# Patient Record
Sex: MALE | Race: WHITE | NOT HISPANIC OR LATINO | Employment: FULL TIME | URBAN - METROPOLITAN AREA
[De-identification: names, ages, dates, MRNs, and addresses within clinical notes are randomized per-mention and may not be internally consistent; named-entity substitution may affect disease eponyms.]

---

## 2017-11-06 ENCOUNTER — OFFICE VISIT (OUTPATIENT)
Dept: URGENT CARE | Facility: CLINIC | Age: 42
End: 2017-11-06
Payer: COMMERCIAL

## 2017-11-06 VITALS
HEIGHT: 66 IN | SYSTOLIC BLOOD PRESSURE: 123 MMHG | OXYGEN SATURATION: 97 % | RESPIRATION RATE: 16 BRPM | DIASTOLIC BLOOD PRESSURE: 84 MMHG | TEMPERATURE: 98 F | WEIGHT: 188 LBS | BODY MASS INDEX: 30.22 KG/M2 | HEART RATE: 81 BPM

## 2017-11-06 DIAGNOSIS — R42 DIZZINESS: Primary | ICD-10-CM

## 2017-11-06 LAB — GLUCOSE SERPL-MCNC: 99 MG/DL (ref 70–110)

## 2017-11-06 PROCEDURE — 99203 OFFICE O/P NEW LOW 30 MIN: CPT | Mod: S$GLB,,, | Performed by: FAMILY MEDICINE

## 2017-11-06 PROCEDURE — 82948 REAGENT STRIP/BLOOD GLUCOSE: CPT | Mod: S$GLB,,, | Performed by: FAMILY MEDICINE

## 2017-11-06 NOTE — PROGRESS NOTES
"Subjective:       Patient ID: Jem Alvarez is a 42 y.o. male.    Vitals:  height is 5' 6" (1.676 m) and weight is 85.3 kg (188 lb). His temperature is 98.2 °F (36.8 °C). His blood pressure is 123/84 and his pulse is 81. His respiration is 16 and oxygen saturation is 97%.     Chief Complaint: Hypertension    Patient states has episodes of dizziness, vision issues.  Blood pressure was 147/89 about an hour ago.       Hypertension   This is a new problem. The current episode started today. The problem is unchanged. Associated symptoms include malaise/fatigue. Pertinent negatives include no blurred vision, chest pain, headaches or shortness of breath. Past treatments include nothing.     Review of Systems   Constitution: Positive for weakness and malaise/fatigue. Negative for chills and fever.   HENT: Negative for sore throat.    Eyes: Negative for blurred vision.   Cardiovascular: Negative for chest pain.   Respiratory: Negative for shortness of breath.    Skin: Negative for rash.   Musculoskeletal: Negative for back pain and joint pain.   Gastrointestinal: Negative for abdominal pain, diarrhea, nausea and vomiting.   Neurological: Positive for dizziness and focal weakness. Negative for headaches.   Psychiatric/Behavioral: The patient is not nervous/anxious.        Objective:      Physical Exam   Constitutional: He is oriented to person, place, and time. He appears well-developed and well-nourished. He is cooperative.  Non-toxic appearance. He does not appear ill. No distress.   Pt mentioned anxiety during the exam   HENT:   Head: Normocephalic and atraumatic.   Right Ear: Hearing, tympanic membrane, external ear and ear canal normal.   Left Ear: Hearing, tympanic membrane, external ear and ear canal normal.   Nose: Nose normal. No mucosal edema, rhinorrhea or nasal deformity. No epistaxis. Right sinus exhibits no maxillary sinus tenderness and no frontal sinus tenderness. Left sinus exhibits no maxillary sinus " tenderness and no frontal sinus tenderness.   Mouth/Throat: Uvula is midline, oropharynx is clear and moist and mucous membranes are normal. No trismus in the jaw. Normal dentition. No uvula swelling. No posterior oropharyngeal erythema.   Eyes: Conjunctivae and lids are normal. Right eye exhibits no discharge. Left eye exhibits no discharge. No scleral icterus.   Sclera clear bilat   Neck: Trachea normal, normal range of motion, full passive range of motion without pain and phonation normal. Neck supple.   Cardiovascular: Normal rate, regular rhythm, normal heart sounds, intact distal pulses and normal pulses.    Pulmonary/Chest: Effort normal and breath sounds normal. No respiratory distress.   Abdominal: Soft. Normal appearance. He exhibits no distension, no pulsatile midline mass and no mass. There is no tenderness.   Musculoskeletal: Normal range of motion. He exhibits no edema or deformity.   Neurological: He is alert and oriented to person, place, and time. He exhibits normal muscle tone. Coordination normal.   Skin: Skin is warm, dry and intact. He is not diaphoretic. No pallor.   Psychiatric: He has a normal mood and affect. His speech is normal and behavior is normal. Judgment and thought content normal. Cognition and memory are normal.   Nursing note and vitals reviewed.      Assessment:       1. Dizziness        Plan:         Dizziness  -     POCT glucose    pt states that he had gluc checked by [facility] this am-70 and now 99; and ekg at [facility] which he states was normal. Advised that BP ok now, and glucose normal range. If  chest pain returns or worsens, should go to ER for further w/u

## 2017-11-09 ENCOUNTER — TELEPHONE (OUTPATIENT)
Dept: URGENT CARE | Facility: CLINIC | Age: 42
End: 2017-11-09

## 2017-11-09 ENCOUNTER — OFFICE VISIT (OUTPATIENT)
Dept: FAMILY MEDICINE | Facility: CLINIC | Age: 42
End: 2017-11-09
Payer: COMMERCIAL

## 2017-11-09 VITALS
SYSTOLIC BLOOD PRESSURE: 126 MMHG | HEIGHT: 66 IN | WEIGHT: 189 LBS | BODY MASS INDEX: 30.37 KG/M2 | HEART RATE: 82 BPM | DIASTOLIC BLOOD PRESSURE: 92 MMHG | OXYGEN SATURATION: 98 %

## 2017-11-09 DIAGNOSIS — R55 NEAR SYNCOPE: ICD-10-CM

## 2017-11-09 DIAGNOSIS — F41.9 ANXIETY: Primary | ICD-10-CM

## 2017-11-09 DIAGNOSIS — R03.0 ELEVATED BLOOD PRESSURE READING WITHOUT DIAGNOSIS OF HYPERTENSION: ICD-10-CM

## 2017-11-09 PROCEDURE — 99203 OFFICE O/P NEW LOW 30 MIN: CPT | Mod: ,,, | Performed by: NURSE PRACTITIONER

## 2017-11-09 RX ORDER — SERTRALINE HYDROCHLORIDE 50 MG/1
50 TABLET, FILM COATED ORAL NIGHTLY
Qty: 30 TABLET | Refills: 1 | Status: SHIPPED | OUTPATIENT
Start: 2017-11-09 | End: 2017-11-14 | Stop reason: ALTCHOICE

## 2017-11-09 RX ORDER — ALPRAZOLAM 0.25 MG/1
0.5 TABLET ORAL DAILY
COMMUNITY
Start: 2017-11-07 | End: 2017-11-14 | Stop reason: ALTCHOICE

## 2017-11-09 NOTE — PROGRESS NOTES
SUBJECTIVE:   HPI:  Fatigue (felt close to passing out and nauseated. BS and BP was checked. Sugar was low and pressure was high.)      2 weeks ago had an appt with Dr Giang to evaluate heart palpitations. Dr Carrillo felt it may be related to anxiety and was started on xanax. Feels the xanax has helped a little with his anxiety but continues to have daily episodes. Has several test scheduled along with labs next week. 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 EMT was there and did an ekg which was wnl, bp was slightly elevated and glucose was low. When he was younger he would feel faint if he didn't eat for several hours.       Fatigue   This is a new problem. The current episode started in the past 7 days. The problem occurs daily. The problem has been unchanged. Associated symptoms include fatigue. Pertinent negatives include no chills, diaphoresis, joint swelling or vomiting. The treatment provided no relief.       (Not in a hospital admission)  Review of patient's allergies indicates:  No Known Allergies  No current outpatient prescriptions on file prior to visit.     No current facility-administered medications on file prior to visit.      Past Medical History:   Diagnosis Date    Anxiety      Past Surgical History:   Procedure Laterality Date    None       Family History   Problem Relation Age of Onset    Hypertension      No Known Problems Mother     Hypertension Father     Arthritis Father     Stroke Maternal Grandmother     No Known Problems Maternal Grandfather     Stroke Paternal Grandmother     No Known Problems Paternal Grandfather      Social History   Substance Use Topics    Smoking status: Current Some Day Smoker     Packs/day: 0.50     Types: Cigarettes     Start date: 1/1/2015    Smokeless tobacco: Never Used    Alcohol use Yes      Comment: seldom        Review of Systems   Constitutional: Positive for fatigue. Negative for  "appetite change, chills, diaphoresis and unexpected weight change.   HENT: Negative for ear discharge, facial swelling, hearing loss, nosebleeds and trouble swallowing.    Eyes: Negative for photophobia, pain and visual disturbance.   Respiratory: Negative for apnea and choking.    Cardiovascular: Negative for palpitations.   Gastrointestinal: Negative for blood in stool and vomiting.   Endocrine: Negative for polyphagia.   Genitourinary: Negative for difficulty urinating and hematuria.   Musculoskeletal: Negative for gait problem and joint swelling.   Skin: Negative for pallor.   Neurological: Negative for seizures and speech difficulty.   Hematological: Does not bruise/bleed easily.   Psychiatric/Behavioral: Negative for agitation and confusion.      OBJECTIVE:      Vitals:    11/09/17 0916   BP: 120/88   Pulse: 82   SpO2: 98%   Weight: 85.7 kg (189 lb)   Height: 5' 6" (1.676 m)     Physical Exam   Constitutional: He is oriented to person, place, and time. He appears well-developed and well-nourished.   HENT:   Head: Atraumatic.   Eyes: Conjunctivae are normal.   Neck: Neck supple.   Cardiovascular: Normal rate, regular rhythm, normal heart sounds and intact distal pulses.    Pulmonary/Chest: Effort normal and breath sounds normal.   Abdominal: Soft. Bowel sounds are normal. He exhibits no distension.   Musculoskeletal: Normal range of motion.   Neurological: He is alert and oriented to person, place, and time.   Skin: Skin is warm and dry.   Psychiatric: He has a normal mood and affect. His behavior is normal. Thought content normal. Cognition and memory are normal.   Nursing note and vitals reviewed.     Assessment:       1. Anxiety    2. Near syncope    3. Elevated blood pressure reading without diagnosis of hypertension        Plan:       Anxiety  -     sertraline (ZOLOFT) 50 MG tablet; Take 1 tablet (50 mg total) by mouth every evening. 1/2 tab x10d then 1 tab  Dispense: 30 tablet; Refill: 1              "   Near syncope  -     Glucose tolerance, 3 hours; Future; Expected date: 11/23/2017  -     CBC auto differential; Future; Expected date: 11/09/2017  -     Comprehensive metabolic panel; Future; Expected date: 11/09/2017  -     TSH; Future; Expected date: 11/09/2017  Will request copy of labs and diagnostic from Dr Giang    Elevated blood pressure reading without diagnosis of hypertension  Advise pt to take low sodium, healthier diet and exercise. Maintain the ideal body weight and if not improved discussed adding medication. Voiced understanding        Return in about 6 weeks (around 12/21/2017).      11/9/2017 JOLENE Ibrahim, FNP

## 2017-11-09 NOTE — PATIENT INSTRUCTIONS

## 2017-11-13 ENCOUNTER — TELEPHONE (OUTPATIENT)
Dept: FAMILY MEDICINE | Facility: CLINIC | Age: 42
End: 2017-11-13

## 2017-11-13 NOTE — TELEPHONE ENCOUNTER
Pt called and said he does not like the way the zoloft is making him feel. He says his head has been hurting and tingling and he is waking up a lot at night. He has been only taking half nightly.

## 2017-11-14 RX ORDER — BUSPIRONE HYDROCHLORIDE 10 MG/1
10 TABLET ORAL DAILY
Qty: 30 TABLET | Refills: 2 | Status: SHIPPED | OUTPATIENT
Start: 2017-11-14 | End: 2018-03-06

## 2018-03-06 PROBLEM — R07.2 PRECORDIAL PAIN: Status: ACTIVE | Noted: 2018-03-06

## 2018-03-06 PROBLEM — R07.9 CHEST PAIN: Status: RESOLVED | Noted: 2018-03-06 | Resolved: 2018-03-06

## 2018-03-06 PROBLEM — F41.9 ANXIETY: Status: ACTIVE | Noted: 2018-03-06

## 2018-03-06 PROBLEM — R07.9 CHEST PAIN: Status: ACTIVE | Noted: 2018-03-06

## 2018-03-06 PROBLEM — F17.200 SMOKING: Status: ACTIVE | Noted: 2018-03-06

## 2018-05-31 ENCOUNTER — TELEPHONE (OUTPATIENT)
Dept: FAMILY MEDICINE | Facility: CLINIC | Age: 43
End: 2018-05-31

## 2018-05-31 ENCOUNTER — OFFICE VISIT (OUTPATIENT)
Dept: FAMILY MEDICINE | Facility: CLINIC | Age: 43
End: 2018-05-31
Payer: COMMERCIAL

## 2018-05-31 VITALS
SYSTOLIC BLOOD PRESSURE: 120 MMHG | RESPIRATION RATE: 16 BRPM | HEART RATE: 67 BPM | HEIGHT: 66 IN | BODY MASS INDEX: 31.08 KG/M2 | WEIGHT: 193.38 LBS | TEMPERATURE: 98 F | OXYGEN SATURATION: 98 % | DIASTOLIC BLOOD PRESSURE: 70 MMHG

## 2018-05-31 DIAGNOSIS — N63.20 LEFT BREAST LUMP: Primary | ICD-10-CM

## 2018-05-31 DIAGNOSIS — N63.25 BREAST LUMP ON LEFT SIDE AT 3 O'CLOCK POSITION: Primary | ICD-10-CM

## 2018-05-31 PROCEDURE — 3008F BODY MASS INDEX DOCD: CPT | Mod: ,,, | Performed by: NURSE PRACTITIONER

## 2018-05-31 PROCEDURE — 99213 OFFICE O/P EST LOW 20 MIN: CPT | Mod: ,,, | Performed by: NURSE PRACTITIONER

## 2018-05-31 NOTE — TELEPHONE ENCOUNTER
----- Message from Lyssa Sullivan sent at 5/31/2018 10:42 AM CDT -----  Contact: [facility] [phone]/ fax [phone]  They need updated order for this patient; radiologist is requesting order for bilateral diagnostic mammogram with ultrasound and a payable code for these procedures.  Must be a specific code not unspecified please.  Lm

## 2018-05-31 NOTE — PATIENT INSTRUCTIONS
4 Steps for Eating Healthier  Changing the way you eat can improve your health. It can lower your cholesterol and blood pressure, and help you stay at a healthy weight. Your diet doesnt have to be bland and boring to be healthy. Just watch your calories and follow these steps:    1. Eat fewer unhealthy fats  · Choose more fish and lean meats instead of fatty cuts of meat.  · Skip butter and lard, and use less margarine.  · Pass on foods that have palm, coconut, or hydrogenated oils.  · Eat fewer high-fat dairy foods like cheese, ice cream, and whole milk.  · Get a heart-healthy cookbook and try some low-fat recipes.  2. Go light on salt  · Keep the saltshaker off the table.  · Limit high-salt ingredients, such as soy sauce, bouillon, and garlic salt.  · Instead of adding salt when cooking, season your food with herbs and flavorings. Try lemon, garlic, and onion.  · Limit convenience foods, such as boxed or canned foods and restaurant food.  · Read food labels and choose lower-sodium options.  3. Limit sugar  · Pause before you add sugars to pancakes, cereal, coffee, or tea. This includes white and brown table sugar, syrup, honey, and molasses. Cut your usual amount by half.  · Use non-sugar sweeteners. Stevia, aspartame, and sucralose can satisfy a sweet tooth without adding calories.  · Swap out sugar-filled soda and other drinks. Buy sugar-free or low-calorie beverages. Remember water is always the best choice.  · Read labels and choose foods with less added sugar. Keep in mind that dairy foods and foods with fruit will have some natural sugar.  · Cut the sugar in recipes by 1/3 to 1/2. Boost the flavor with extracts like almond, vanilla, or orange. Or add spices such as cinnamon or nutmeg.  4. Eat more fiber  · Eat fresh fruits and vegetables every day.  · Boost your diet with whole grains. Go for oats, whole-grain rice, and bran.  · Add beans and lentils to your meals.  · Drink more water to match your fiber  increase. This is to help prevent constipation.  Date Last Reviewed: 5/11/2015  © 6881-2184 [employer] [place] [employer]. [address] [place] [address], [place] [address]. All rights reserved. This information is not intended as a substitute for professional medical care. Always follow your healthcare professional's instructions.

## 2018-05-31 NOTE — PROGRESS NOTES
SUBJECTIVE:      Patient ID: Jem Alvarez is a 42 y.o. male.    Chief Complaint: Chest Pain (burning sensation.Patient states that he felt a lump on the left side and when he rubbed it he felt a burning sensation.)    Patient states a week ago he noticed a lump in his left breast, hurt to touch it or lay on that side. He left it alone for a few days and it has gotten better. Not as painful but if he rubs it is tender        Past Surgical History:   Procedure Laterality Date    None       Family History   Problem Relation Age of Onset    Hypertension Unknown     No Known Problems Mother     Hypertension Father     Arthritis Father     Stroke Maternal Grandmother     No Known Problems Maternal Grandfather     Stroke Paternal Grandmother     No Known Problems Paternal Grandfather       Social History     Social History    Marital status:      Spouse name: N/A    Number of children: N/A    Years of education: N/A     Occupational History          Social History Main Topics    Smoking status: Current Some Day Smoker     Packs/day: 0.50     Types: Cigarettes     Start date: 1/1/2015    Smokeless tobacco: Never Used    Alcohol use Yes      Comment: seldom    Drug use: No    Sexual activity: Yes     Other Topics Concern    None     Social History Narrative    None     No current outpatient prescriptions on file.     No current facility-administered medications for this visit.      Review of patient's allergies indicates:  No Known Allergies   Past Medical History:   Diagnosis Date    Anxiety      Past Surgical History:   Procedure Laterality Date    None         Review of Systems   Constitutional: Negative for appetite change, chills, diaphoresis and unexpected weight change.   HENT: Negative for ear discharge, facial swelling, hearing loss, nosebleeds and trouble swallowing.    Eyes: Negative for photophobia, pain and visual disturbance.   Respiratory: Negative for  "apnea and choking.    Cardiovascular: Negative for palpitations.   Gastrointestinal: Negative for blood in stool and vomiting.   Endocrine: Negative for polyphagia.   Genitourinary: Negative for difficulty urinating and hematuria.   Musculoskeletal: Negative for gait problem and joint swelling.   Skin: Negative for pallor.   Neurological: Negative for seizures and speech difficulty.   Hematological: Does not bruise/bleed easily.   Psychiatric/Behavioral: Negative for agitation and confusion.      OBJECTIVE:      Vitals:    05/31/18 0928   BP: 120/70   Pulse: 67   Resp: 16   SpO2: 98%   Weight: 87.7 kg (193 lb 6.4 oz)   Height: 5' 6" (1.676 m)     Physical Exam   Constitutional: He is oriented to person, place, and time. He appears well-developed and well-nourished.   HENT:   Head: Atraumatic.   Eyes: Conjunctivae are normal.   Neck: Neck supple.   Cardiovascular: Normal rate, regular rhythm, normal heart sounds and intact distal pulses.    Pulmonary/Chest: Effort normal and breath sounds normal.       Abdominal: Soft. Bowel sounds are normal. He exhibits no distension.   Musculoskeletal: Normal range of motion.   Neurological: He is alert and oriented to person, place, and time.   Skin: Skin is warm and dry.   Psychiatric: He has a normal mood and affect.   Nursing note and vitals reviewed.     Assessment:       1. Left breast lump        Plan:       Left breast lump  -     US Breast Left Limited; Future; Expected date: 05/31/2018        Follow-up if symptoms worsen or fail to improve.      5/31/2018 JOLENE Ibrahim, FNP      "

## 2018-06-01 ENCOUNTER — TELEPHONE (OUTPATIENT)
Dept: FAMILY MEDICINE | Facility: CLINIC | Age: 43
End: 2018-06-01

## 2018-06-01 DIAGNOSIS — N64.59 OTHER SIGNS AND SYMPTOMS IN BREAST: Primary | ICD-10-CM

## 2018-06-19 ENCOUNTER — TELEPHONE (OUTPATIENT)
Dept: FAMILY MEDICINE | Facility: CLINIC | Age: 43
End: 2018-06-19

## 2018-07-17 ENCOUNTER — TELEPHONE (OUTPATIENT)
Dept: FAMILY MEDICINE | Facility: CLINIC | Age: 43
End: 2018-07-17

## 2018-07-17 NOTE — TELEPHONE ENCOUNTER
Pt has appt tomorrow. Says for last 3 days he has been having severe lower back pain causing him to break out in sweat and tears.He is asking if we can order imaging before his appt.

## 2018-07-18 ENCOUNTER — OFFICE VISIT (OUTPATIENT)
Dept: FAMILY MEDICINE | Facility: CLINIC | Age: 43
End: 2018-07-18
Payer: COMMERCIAL

## 2018-07-18 VITALS
BODY MASS INDEX: 30.37 KG/M2 | HEART RATE: 78 BPM | DIASTOLIC BLOOD PRESSURE: 80 MMHG | SYSTOLIC BLOOD PRESSURE: 124 MMHG | WEIGHT: 189 LBS | HEIGHT: 66 IN | OXYGEN SATURATION: 98 %

## 2018-07-18 DIAGNOSIS — M54.50 ACUTE BILATERAL LOW BACK PAIN WITHOUT SCIATICA: Primary | ICD-10-CM

## 2018-07-18 PROCEDURE — 3008F BODY MASS INDEX DOCD: CPT | Mod: ,,, | Performed by: NURSE PRACTITIONER

## 2018-07-18 PROCEDURE — 99214 OFFICE O/P EST MOD 30 MIN: CPT | Mod: 25,,, | Performed by: NURSE PRACTITIONER

## 2018-07-18 PROCEDURE — 96372 THER/PROPH/DIAG INJ SC/IM: CPT | Mod: ,,, | Performed by: NURSE PRACTITIONER

## 2018-07-18 RX ORDER — MELOXICAM 15 MG/1
15 TABLET ORAL DAILY
Qty: 14 TABLET | Refills: 0 | Status: SHIPPED | OUTPATIENT
Start: 2018-07-18 | End: 2018-12-19

## 2018-07-18 RX ORDER — CYCLOBENZAPRINE HCL 10 MG
10 TABLET ORAL 3 TIMES DAILY PRN
Qty: 30 TABLET | Refills: 0 | Status: SHIPPED | OUTPATIENT
Start: 2018-07-18 | End: 2018-07-28

## 2018-07-18 RX ORDER — KETOROLAC TROMETHAMINE 30 MG/ML
60 INJECTION, SOLUTION INTRAMUSCULAR; INTRAVENOUS ONCE
Status: COMPLETED | OUTPATIENT
Start: 2018-07-18 | End: 2018-07-18

## 2018-07-18 RX ADMIN — KETOROLAC TROMETHAMINE 60 MG: 30 INJECTION, SOLUTION INTRAMUSCULAR; INTRAVENOUS at 04:07

## 2018-07-18 NOTE — PROGRESS NOTES
SUBJECTIVE:      Patient ID: Jem Alvarez is a 43 y.o. male.    Chief Complaint: Back Pain    Patient states he was spreading gravel at his house 5 days ago and noticed his lower back hurting that evening. The next day his back was hurting worse. Hurts to sit for long periods of time and when he moves in a certain position the pain catches him.       Back Pain   This is a new problem. The current episode started in the past 7 days. The problem occurs daily. The problem is unchanged. The pain is present in the lumbar spine. The quality of the pain is described as aching. The pain does not radiate. The pain is at a severity of 6/10. The pain is the same all the time. The symptoms are aggravated by sitting and lying down. Pertinent negatives include no abdominal pain, bladder incontinence, bowel incontinence, chest pain, dysuria, headaches, paresthesias, pelvic pain or weakness. He has tried heat and ice for the symptoms. The treatment provided mild relief.       Past Surgical History:   Procedure Laterality Date    None       Family History   Problem Relation Age of Onset    Hypertension Unknown     No Known Problems Mother     Hypertension Father     Arthritis Father     Stroke Maternal Grandmother     No Known Problems Maternal Grandfather     Stroke Paternal Grandmother     No Known Problems Paternal Grandfather       Social History     Social History    Marital status:      Spouse name: N/A    Number of children: N/A    Years of education: N/A     Occupational History          Social History Main Topics    Smoking status: Current Some Day Smoker     Packs/day: 0.50     Types: Cigarettes     Start date: 1/1/2015    Smokeless tobacco: Never Used    Alcohol use Yes      Comment: seldom    Drug use: No    Sexual activity: Yes     Other Topics Concern    None     Social History Narrative    None     Current Outpatient Prescriptions   Medication Sig Dispense Refill  "   cyclobenzaprine (FLEXERIL) 10 MG tablet Take 1 tablet (10 mg total) by mouth 3 (three) times daily as needed for Muscle spasms. 30 tablet 0    meloxicam (MOBIC) 15 MG tablet Take 1 tablet (15 mg total) by mouth once daily. 14 tablet 0     Current Facility-Administered Medications   Medication Dose Route Frequency Provider Last Rate Last Dose    ketorolac injection 60 mg  60 mg Intramuscular Once Franki Salcedo NP         Review of patient's allergies indicates:  No Known Allergies   Past Medical History:   Diagnosis Date    Anxiety      Past Surgical History:   Procedure Laterality Date    None         Review of Systems   Constitutional: Negative for appetite change, chills, diaphoresis and unexpected weight change.   HENT: Negative for ear discharge, facial swelling, hearing loss, nosebleeds and trouble swallowing.    Eyes: Negative for photophobia, pain and visual disturbance.   Respiratory: Negative for apnea, choking, shortness of breath and wheezing.    Cardiovascular: Negative for chest pain and palpitations.   Gastrointestinal: Negative for abdominal pain, blood in stool, bowel incontinence and vomiting.   Endocrine: Negative for polyphagia.   Genitourinary: Negative for bladder incontinence, difficulty urinating, dysuria, hematuria and pelvic pain.   Musculoskeletal: Positive for back pain. Negative for gait problem and joint swelling.   Skin: Negative for pallor.   Neurological: Negative for seizures, speech difficulty, weakness, headaches and paresthesias.   Hematological: Does not bruise/bleed easily.   Psychiatric/Behavioral: Negative for agitation and confusion.      OBJECTIVE:      Vitals:    07/18/18 1449   BP: 124/80   Pulse: 78   SpO2: 98%   Weight: 85.7 kg (189 lb)   Height: 5' 6" (1.676 m)     Physical Exam   Constitutional: He is oriented to person, place, and time. He appears well-developed and well-nourished.   HENT:   Head: Atraumatic.   Mouth/Throat: Uvula is midline.   Eyes: " Conjunctivae are normal.   Neck: Neck supple.   Cardiovascular: Normal rate, regular rhythm, normal heart sounds and intact distal pulses.    Pulmonary/Chest: Effort normal and breath sounds normal.   Abdominal: Soft. Bowel sounds are normal. He exhibits no distension.   Musculoskeletal: Normal range of motion.        Lumbar back: He exhibits tenderness and spasm. He exhibits no bony tenderness and no swelling.   SLR neg  Pain with forward flexion      Neurological: He is alert and oriented to person, place, and time. He has normal strength.   Skin: Skin is warm and dry. No rash noted.   Psychiatric: He has a normal mood and affect. His speech is normal and behavior is normal.   Nursing note and vitals reviewed.     Assessment:       1. Acute bilateral low back pain without sciatica        Plan:       Acute bilateral low back pain without sciatica  - start   meloxicam (MOBIC) 15 MG tablet; Take 1 tablet (15 mg total) by mouth once daily.  Dispense: 14 tablet; Refill: 0  -     ketorolac injection 60 mg; Inject 2 mLs (60 mg total) into the muscle once.  -  start   cyclobenzaprine (FLEXERIL) 10 MG tablet; Take 1 tablet (10 mg total) by mouth 3 (three) times daily as needed for Muscle spasms.  Dispense: 30 tablet; Refill: 0  -     Ambulatory Referral to Physical/Occupational Therapy        Follow-up if symptoms worsen or fail to improve.      7/18/2018 JOLENE bIrahim, TRINHP

## 2018-07-18 NOTE — PATIENT INSTRUCTIONS
Self-Care for Low Back Pain    Most people have low back pain now and then. In many cases, it isnt serious and self-care can help. Sometimes low back pain can be a sign of a bigger problem. Call your healthcare provider if your pain returns often or gets worse over time. For the long-term care of your back, get regular exercise, lose any excess weight and learn good posture.  Take a short rest  Lying down during the day may be beneficial for short periods of time if severe pain increases with sitting or standing. Long-term bed rest could be detrimental.  Reduce pain and swelling  Cold reduces swelling. Both cold and heat can reduce pain. Protect your skin by placing a towel between your body and the ice or heat source.  · For the first few days, apply an ice pack for 15 to 20 minutes .  · After the first few days, try heat for 15 minutes at a time to ease pain. Never sleep on a heating pad.  · Over-the-counter medicine can help control pain and swelling. Try aspirin or ibuprofen.  Exercise  Exercise can help your back heal. It also helps your back get stronger and more flexible, preventing any reinjury. Ask your healthcare provider about specific exercises for your back.  Use good posture to avoid reinjury  · When moving, bend at the hips and knees. Dont bend at the waist or twist around.  · When lifting, keep the object close to your body. Dont try to lift more than you can handle.  · When sitting, keep your lower back supported. Use a rolled-up towel as needed.  Seek immediate medical care if:  · Youre unable to stand or walk.  · You have a temperature over 100.4°F (38.0°C)  · You have frequent, painful, or bloody urination.  · You have severe abdominal pain.  · You have a sharp, stabbing pain.  · Your pain is constant.  · You have pain or numbness in your leg.  · You feel pain in a new area of your back.  · You notice that the pain isnt decreasing after more than a week.   Date Last Reviewed: 9/29/2015  ©  6615-0738 [employer]. [address] [place] [address], [place] [address]. All rights reserved. This information is not intended as a substitute for professional medical care. Always follow your healthcare professional's instructions.

## 2018-12-19 ENCOUNTER — OFFICE VISIT (OUTPATIENT)
Dept: FAMILY MEDICINE | Facility: CLINIC | Age: 43
End: 2018-12-19
Payer: COMMERCIAL

## 2018-12-19 VITALS
SYSTOLIC BLOOD PRESSURE: 110 MMHG | HEART RATE: 77 BPM | DIASTOLIC BLOOD PRESSURE: 80 MMHG | OXYGEN SATURATION: 97 % | HEIGHT: 66 IN | TEMPERATURE: 99 F | WEIGHT: 193 LBS | BODY MASS INDEX: 31.02 KG/M2

## 2018-12-19 DIAGNOSIS — H65.03 BILATERAL ACUTE SEROUS OTITIS MEDIA, RECURRENCE NOT SPECIFIED: Primary | ICD-10-CM

## 2018-12-19 DIAGNOSIS — R42 DIZZINESS: ICD-10-CM

## 2018-12-19 PROCEDURE — 99213 OFFICE O/P EST LOW 20 MIN: CPT | Mod: ,,, | Performed by: NURSE PRACTITIONER

## 2018-12-19 PROCEDURE — 3008F BODY MASS INDEX DOCD: CPT | Mod: ,,, | Performed by: NURSE PRACTITIONER

## 2018-12-19 RX ORDER — FLUTICASONE PROPIONATE 50 MCG
1 SPRAY, SUSPENSION (ML) NASAL DAILY
Qty: 1 BOTTLE | Refills: 0 | Status: SHIPPED | OUTPATIENT
Start: 2018-12-19 | End: 2019-04-23 | Stop reason: ALTCHOICE

## 2018-12-19 RX ORDER — FEXOFENADINE HCL AND PSEUDOEPHEDRINE HCI 60; 120 MG/1; MG/1
1 TABLET, EXTENDED RELEASE ORAL 2 TIMES DAILY
Qty: 20 TABLET | Refills: 0 | Status: SHIPPED | OUTPATIENT
Start: 2018-12-19 | End: 2018-12-29

## 2018-12-19 NOTE — PATIENT INSTRUCTIONS

## 2018-12-19 NOTE — PROGRESS NOTES
SUBJECTIVE:      Patient ID: Jem Alvarez is a 43 y.o. male.    Chief Complaint: Otalgia and Dizziness    Patient is here today complaining of bilat ear pain/fullness and dizziness for 2 days. The dizziness is more noticeable with position changes. No fever, no change in vision      Otalgia    There is pain in both ears. This is a new problem. The current episode started in the past 7 days. The problem has been unchanged. There has been no fever. Associated symptoms include headaches. Pertinent negatives include no abdominal pain, coughing, ear discharge, hearing loss, rhinorrhea or vomiting. He has tried nothing for the symptoms.   Dizziness:   Chronicity:  New  Onset:  In the past 7 days  Progression since onset:  Unchanged and gradually improving  Severity:  Mild  Duration:  Very brief  Dizziness characteristics:  Spinning inside head only   Associated symptoms: ear congestion, ear pain and headaches.no hearing loss, no fever, no vomiting, no diaphoresis, no weakness, no visual disturbances, no light-headedness, no palpitations, no facial weakness, no slurred speech, no numbness in extremities and no chest pain.  Aggravated by:  Bending and position changes  Treatments tried:  Nothing      Past Surgical History:   Procedure Laterality Date    None       Family History   Problem Relation Age of Onset    Hypertension Unknown     No Known Problems Mother     Hypertension Father     Arthritis Father     Stroke Maternal Grandmother     No Known Problems Maternal Grandfather     Stroke Paternal Grandmother     No Known Problems Paternal Grandfather       Social History     Socioeconomic History    Marital status:      Spouse name: None    Number of children: None    Years of education: None    Highest education level: None   Social Needs    Financial resource strain: None    Food insecurity - worry: None    Food insecurity - inability: None    Transportation needs - medical: None     Transportation needs - non-medical: None   Occupational History    Occupation:    Tobacco Use    Smoking status: Former Smoker     Packs/day: 0.50     Types: Cigarettes     Start date: 2015     Last attempt to quit: 10/19/2018     Years since quittin.1    Smokeless tobacco: Never Used   Substance and Sexual Activity    Alcohol use: Yes     Comment: seldom    Drug use: No    Sexual activity: Yes   Other Topics Concern    None   Social History Narrative    None     Current Outpatient Medications   Medication Sig Dispense Refill    fexofenadine-pseudoephedrine  mg (ALLEGRA-D)  mg per tablet Take 1 tablet by mouth 2 (two) times daily. for 10 days 20 tablet 0    fluticasone (FLONASE) 50 mcg/actuation nasal spray 1 spray (50 mcg total) by Each Nare route once daily. 1 Bottle 0     No current facility-administered medications for this visit.      Review of patient's allergies indicates:  No Known Allergies   Past Medical History:   Diagnosis Date    Anxiety      Past Surgical History:   Procedure Laterality Date    None         Review of Systems   Constitutional: Negative for appetite change, chills, diaphoresis, fever and unexpected weight change.   HENT: Positive for ear pain. Negative for ear discharge, facial swelling, hearing loss, nosebleeds, rhinorrhea and trouble swallowing.    Eyes: Negative for photophobia, pain and visual disturbance.   Respiratory: Negative for apnea, cough, choking, shortness of breath and wheezing.    Cardiovascular: Negative for chest pain and palpitations.   Gastrointestinal: Negative for abdominal pain, blood in stool and vomiting.   Endocrine: Negative for polyphagia.   Genitourinary: Negative for difficulty urinating and hematuria.   Musculoskeletal: Negative for gait problem and joint swelling.   Skin: Negative for pallor.   Neurological: Positive for dizziness and headaches. Negative for seizures, speech difficulty, weakness and  "light-headedness.   Hematological: Does not bruise/bleed easily.   Psychiatric/Behavioral: Negative for agitation and confusion.      OBJECTIVE:      Vitals:    12/19/18 1556   BP: 110/80   Pulse: 77   Temp: 98.6 °F (37 °C)   TempSrc: Oral   SpO2: 97%   Weight: 87.5 kg (193 lb)   Height: 5' 6" (1.676 m)     Physical Exam   Constitutional: He is oriented to person, place, and time. He appears well-developed and well-nourished.   HENT:   Head: Atraumatic.   Right Ear: A middle ear effusion is present.   Left Ear: A middle ear effusion is present.   Nose: Nose normal.   Mouth/Throat: Oropharynx is clear and moist and mucous membranes are normal.   No tmj tenderness    Eyes: Conjunctivae and EOM are normal. Pupils are equal, round, and reactive to light.   Neck: Neck supple.   Cardiovascular: Normal rate, regular rhythm, normal heart sounds and intact distal pulses.   Pulmonary/Chest: Effort normal and breath sounds normal.   Abdominal: Soft. Bowel sounds are normal. He exhibits no distension.   Musculoskeletal: Normal range of motion.   Lymphadenopathy:     He has no cervical adenopathy.   Neurological: He is alert and oriented to person, place, and time. He has normal strength. He displays a negative Romberg sign.   Skin: Skin is warm and dry. No rash noted.   Psychiatric: He has a normal mood and affect. His speech is normal and behavior is normal.   Nursing note and vitals reviewed.     Assessment:       1. Bilateral acute serous otitis media, recurrence not specified    2. Dizziness        Plan:       Bilateral acute serous otitis media, recurrence not specified  -     fluticasone (FLONASE) 50 mcg/actuation nasal spray; 1 spray (50 mcg total) by Each Nare route once daily.  Dispense: 1 Bottle; Refill: 0  -     fexofenadine-pseudoephedrine  mg (ALLEGRA-D)  mg per tablet; Take 1 tablet by mouth 2 (two) times daily. for 10 days  Dispense: 20 tablet; Refill: 0    Dizziness  -     Comprehensive metabolic " panel; Future; Expected date: 12/19/2018  -     CBC auto differential; Future; Expected date: 12/19/2018  -     TSH; Future; Expected date: 12/19/2018        Follow-up if symptoms worsen or fail to improve.      12/19/2018 JOLENE Ibrahim, FNP

## 2018-12-26 ENCOUNTER — TELEPHONE (OUTPATIENT)
Dept: FAMILY MEDICINE | Facility: CLINIC | Age: 43
End: 2018-12-26

## 2018-12-28 ENCOUNTER — TELEPHONE (OUTPATIENT)
Dept: FAMILY MEDICINE | Facility: CLINIC | Age: 43
End: 2018-12-28

## 2018-12-28 LAB
ALBUMIN SERPL-MCNC: 4.3 G/DL (ref 3.6–5.1)
ALBUMIN/GLOB SERPL: 1.6 (CALC) (ref 1–2.5)
ALP SERPL-CCNC: 69 U/L (ref 40–115)
ALT SERPL-CCNC: 48 U/L (ref 9–46)
AST SERPL-CCNC: 22 U/L (ref 10–40)
BASOPHILS # BLD AUTO: 67 CELLS/UL (ref 0–200)
BASOPHILS NFR BLD AUTO: 0.8 %
BILIRUB SERPL-MCNC: 0.5 MG/DL (ref 0.2–1.2)
BUN SERPL-MCNC: 12 MG/DL (ref 7–25)
BUN/CREAT SERPL: ABNORMAL (CALC) (ref 6–22)
CALCIUM SERPL-MCNC: 10 MG/DL (ref 8.6–10.3)
CHLORIDE SERPL-SCNC: 103 MMOL/L (ref 98–110)
CO2 SERPL-SCNC: 33 MMOL/L (ref 20–32)
CREAT SERPL-MCNC: 1.14 MG/DL (ref 0.6–1.35)
EOSINOPHIL # BLD AUTO: 101 CELLS/UL (ref 15–500)
EOSINOPHIL NFR BLD AUTO: 1.2 %
ERYTHROCYTE [DISTWIDTH] IN BLOOD BY AUTOMATED COUNT: 11.9 % (ref 11–15)
GFR SERPL CREATININE-BSD FRML MDRD: 79 ML/MIN/1.73M2
GLOBULIN SER CALC-MCNC: 2.7 G/DL (CALC) (ref 1.9–3.7)
GLUCOSE SERPL-MCNC: 90 MG/DL (ref 65–99)
HCT VFR BLD AUTO: 47.9 % (ref 38.5–50)
HGB BLD-MCNC: 16.1 G/DL (ref 13.2–17.1)
LYMPHOCYTES # BLD AUTO: 2545 CELLS/UL (ref 850–3900)
LYMPHOCYTES NFR BLD AUTO: 30.3 %
MCH RBC QN AUTO: 29.8 PG (ref 27–33)
MCHC RBC AUTO-ENTMCNC: 33.6 G/DL (ref 32–36)
MCV RBC AUTO: 88.7 FL (ref 80–100)
MONOCYTES # BLD AUTO: 764 CELLS/UL (ref 200–950)
MONOCYTES NFR BLD AUTO: 9.1 %
NEUTROPHILS # BLD AUTO: 4922 CELLS/UL (ref 1500–7800)
NEUTROPHILS NFR BLD AUTO: 58.6 %
PLATELET # BLD AUTO: 307 THOUSAND/UL (ref 140–400)
PMV BLD REES-ECKER: 10.3 FL (ref 7.5–12.5)
POTASSIUM SERPL-SCNC: 4.9 MMOL/L (ref 3.5–5.3)
PROT SERPL-MCNC: 7 G/DL (ref 6.1–8.1)
RBC # BLD AUTO: 5.4 MILLION/UL (ref 4.2–5.8)
SODIUM SERPL-SCNC: 140 MMOL/L (ref 135–146)
TSH SERPL-ACNC: 0.9 MIU/L (ref 0.4–4.5)
WBC # BLD AUTO: 8.4 THOUSAND/UL (ref 3.8–10.8)

## 2018-12-31 ENCOUNTER — TELEPHONE (OUTPATIENT)
Dept: FAMILY MEDICINE | Facility: CLINIC | Age: 43
End: 2018-12-31

## 2018-12-31 NOTE — TELEPHONE ENCOUNTER
Pt calling about his ears. Says he did see an ENT and they said his ears looked fine and prescribed him a muscle relaxer. Pt says he hasnt taken the prescribed med. Since being seen he is starting to have a lot of sinus pressure and occasionally body aches and hot flashes.. He is on the schedule for 1/7 and is asking for antibiotics

## 2019-01-16 ENCOUNTER — OFFICE VISIT (OUTPATIENT)
Dept: FAMILY MEDICINE | Facility: CLINIC | Age: 44
End: 2019-01-16
Payer: COMMERCIAL

## 2019-01-16 VITALS
HEIGHT: 66 IN | SYSTOLIC BLOOD PRESSURE: 110 MMHG | HEART RATE: 91 BPM | BODY MASS INDEX: 31.34 KG/M2 | WEIGHT: 195 LBS | DIASTOLIC BLOOD PRESSURE: 84 MMHG | OXYGEN SATURATION: 96 %

## 2019-01-16 DIAGNOSIS — F41.9 ANXIETY: Primary | ICD-10-CM

## 2019-01-16 PROCEDURE — 3008F PR BODY MASS INDEX (BMI) DOCUMENTED: ICD-10-PCS | Mod: ,,, | Performed by: NURSE PRACTITIONER

## 2019-01-16 PROCEDURE — 3008F BODY MASS INDEX DOCD: CPT | Mod: ,,, | Performed by: NURSE PRACTITIONER

## 2019-01-16 PROCEDURE — 99213 PR OFFICE/OUTPT VISIT, EST, LEVL III, 20-29 MIN: ICD-10-PCS | Mod: ,,, | Performed by: NURSE PRACTITIONER

## 2019-01-16 PROCEDURE — 99213 OFFICE O/P EST LOW 20 MIN: CPT | Mod: ,,, | Performed by: NURSE PRACTITIONER

## 2019-01-16 RX ORDER — AMOXICILLIN AND CLAVULANATE POTASSIUM 875; 125 MG/1; MG/1
TABLET, FILM COATED ORAL
Refills: 0 | COMMUNITY
Start: 2019-01-02 | End: 2019-04-23 | Stop reason: ALTCHOICE

## 2019-01-16 RX ORDER — BUSPIRONE HYDROCHLORIDE 5 MG/1
5 TABLET ORAL NIGHTLY
Qty: 30 TABLET | Refills: 1 | Status: SHIPPED | OUTPATIENT
Start: 2019-01-16 | End: 2019-04-23 | Stop reason: ALTCHOICE

## 2019-01-16 NOTE — PROGRESS NOTES
SUBJECTIVE:      Patient ID: Jem Alvarez is a 43 y.o. male.    Chief Complaint: Anxiety    Patient is here today to address his anxiety. His son was recently diagnosed with cancer. He has been worrying, the anxiety is keeping him up at night. He was on buspirone in the past which helped him and he would like to try it again.       Anxiety   Presents for initial visit. Onset was 1 to 6 months ago. The problem has been gradually worsening. Symptoms include excessive worry, insomnia, nervous/anxious behavior and restlessness. Patient reports no confusion, depressed mood, irritability, palpitations or suicidal ideas. The severity of symptoms is mild. The symptoms are aggravated by family issues. The quality of sleep is poor. Nighttime awakenings: several.     His past medical history is significant for anxiety/panic attacks. Past treatments include nothing.       Past Surgical History:   Procedure Laterality Date    None       Family History   Problem Relation Age of Onset    Hypertension Unknown     No Known Problems Mother     Hypertension Father     Arthritis Father     Stroke Maternal Grandmother     No Known Problems Maternal Grandfather     Stroke Paternal Grandmother     No Known Problems Paternal Grandfather       Social History     Socioeconomic History    Marital status:      Spouse name: None    Number of children: None    Years of education: None    Highest education level: None   Social Needs    Financial resource strain: None    Food insecurity - worry: None    Food insecurity - inability: None    Transportation needs - medical: None    Transportation needs - non-medical: None   Occupational History    Occupation:    Tobacco Use    Smoking status: Former Smoker     Packs/day: 0.50     Types: Cigarettes     Start date: 2015     Last attempt to quit: 10/19/2018     Years since quittin.2    Smokeless tobacco: Never Used   Substance and  "Sexual Activity    Alcohol use: Yes     Comment: seldom    Drug use: No    Sexual activity: Yes   Other Topics Concern    None   Social History Narrative    None     Current Outpatient Medications   Medication Sig Dispense Refill    fluticasone (FLONASE) 50 mcg/actuation nasal spray 1 spray (50 mcg total) by Each Nare route once daily. 1 Bottle 0    amoxicillin-clavulanate 875-125mg (AUGMENTIN) 875-125 mg per tablet   0    busPIRone (BUSPAR) 5 MG Tab Take 1 tablet (5 mg total) by mouth every evening. 30 tablet 1     No current facility-administered medications for this visit.      Review of patient's allergies indicates:  No Known Allergies   Past Medical History:   Diagnosis Date    Anxiety      Past Surgical History:   Procedure Laterality Date    None         Review of Systems   Constitutional: Negative for appetite change, chills, diaphoresis, irritability and unexpected weight change.   HENT: Negative for ear discharge, facial swelling, hearing loss, nosebleeds and trouble swallowing.    Eyes: Negative for photophobia, pain and visual disturbance.   Respiratory: Negative for apnea and choking.    Cardiovascular: Negative for palpitations.   Gastrointestinal: Negative for blood in stool and vomiting.   Endocrine: Negative for polyphagia.   Genitourinary: Negative for difficulty urinating and hematuria.   Musculoskeletal: Negative for gait problem and joint swelling.   Skin: Negative for pallor.   Neurological: Negative for seizures and speech difficulty.   Hematological: Does not bruise/bleed easily.   Psychiatric/Behavioral: Positive for sleep disturbance. Negative for agitation, confusion, self-injury and suicidal ideas. The patient is nervous/anxious and has insomnia.       OBJECTIVE:      Vitals:    01/16/19 1126   BP: 110/84   Pulse: 91   SpO2: 96%   Weight: 88.5 kg (195 lb)   Height: 5' 6" (1.676 m)     Physical Exam   Constitutional: He is oriented to person, place, and time. He appears " well-developed and well-nourished.   HENT:   Head: Atraumatic.   Eyes: Conjunctivae are normal.   Neck: Neck supple.   Cardiovascular: Normal rate, regular rhythm, normal heart sounds and intact distal pulses.   Pulmonary/Chest: Effort normal and breath sounds normal.   Abdominal: Soft. Bowel sounds are normal. He exhibits no distension.   Musculoskeletal: Normal range of motion.   Neurological: He is alert and oriented to person, place, and time.   Skin: Skin is warm and dry.   Psychiatric: He has a normal mood and affect. His speech is normal and behavior is normal. Thought content normal.   Nursing note and vitals reviewed.     Assessment:       1. Anxiety        Plan:       Anxiety  -     busPIRone (BUSPAR) 5 MG Tab; Take 1 tablet (5 mg total) by mouth every evening.  Dispense: 30 tablet; Refill: 1        Follow-up in about 6 weeks (around 2/27/2019) for anxiety .      1/16/2019 JOLENE Ibrahim, FNP

## 2019-01-16 NOTE — PATIENT INSTRUCTIONS
Treating Anxiety Disorders with Medicine  An anxiety disorder can make you feel nervous or apprehensive, even without a clear reason. In people age 65 and older, generalized anxiety disorder is one of the most commonly diagnosed anxiety disorders. Many times it occurs with depression. Certain anxiety disorders can cause intense feelings of fear or panic. You may even have physical symptoms such as a racing heartbeat, sweating, or dizziness. If you have these feelings, you dont have to suffer anymore. Treatment to help you overcome your fears will likely include therapy (also called counseling). Medicine may also be prescribed to help control your symptoms.    Medicines  Certain medicines may be prescribed to help control your symptoms. So you may feel less anxious. You may also feel able to move forward with therapy. At first, medicines and dosages may need to be adjusted to find what works best for you. Try to be patient. Tell your healthcare provider how a medicine makes you feel. This way, you can work together to find the treatment thats best for you. Keep in mind that medicines can have side effects. Talk with your provider about any side effects that are bothering you. Changing the dose or type of medicine may help. Dont stop taking medicine on your own. That can cause symptoms to come back.  · Anti-anxiety medicine. This medicine eases symptoms and helps you relax. Your healthcare provider will explain when and how to use it. It may be prescribed for use before situations that make you anxious. You may also be told to take medicine on a regular schedule. Anti-anxiety medicine may make you feel a little sleepy or out of it. Dont drive a car or operate machinery while on this medicine, until you know how it affects you.  Caution  Never use alcohol or other drugs with anti-anxiety medicines. This could result in loss of muscular control, sedation, coma, or death. Also, use only the amount of medicine  prescribed for you. If you think you may have taken too much, get emergency care right away.   · Antidepressant medicine. This kind of medicine is often used to treat anxiety, even if you arent depressed. An antidepressant helps balance out brain chemicals. This helps keep anxiety under control. This medicine is taken on a schedule. It takes a few weeks to start working. If you dont notice a change at first, you may just need more time. But if you dont notice results after the first few weeks, tell your provider.  Keep taking medicines as prescribed  Never change your dosage, share or use another person's medicine, or stop taking your medicines without talking to your healthcare provider first. Keep the following in mind:  · Some medicines must be taken on a schedule. Make this part of your daily routine. For instance, always take your pill before brushing your teeth. A pillbox can help you remember if youve taken your medicine each day.  · Medicines are often taken for 6 to 12 months. Your healthcare provider will then evaluate whether you need to stay on them. Many people who have also had therapy may no longer need medicine to manage anxiety.  · You may need to stop taking medicine slowly to give your body time to adjust. When its time to stop, your healthcare provider will tell you more. Remember: Never stop taking your medicine without talking to your provider first.  · If symptoms return, you may need to start taking medicines again. This isnt your fault. Its just the nature of your anxiety disorder.  Special concerns  · Side effects. Medicines may cause side effects. Ask your healthcare provider or pharmacist what you can expect. They may have ideas for avoiding some side effects.  · Sexual problems. Some antidepressants can affect your desire for sex or your ability to have an orgasm. A change in dosage or medicine often solves the problem. If you have a sexual side effect that concerns you, tell your  healthcare provider.  · Addiction. If youve never had a problem with drugs or alcohol, you may not have a problem with medicines used to treat anxiety disorders. But always discuss the medicines with your healthcare provider before taking them. If you have a history of addiction, you may not be able to use certain medicines used to treat anxiety disorders.  · Medicine interactions. Always check with your pharmacist before using any over-the-counter medicines, including herbal supplements.   Date Last Reviewed: 5/1/2017 © 2000-2017 [employer]. [address] [place] [address], [place] [address]. All rights reserved. This information is not intended as a substitute for professional medical care. Always follow your healthcare professional's instructions.

## 2019-04-23 ENCOUNTER — TELEPHONE (OUTPATIENT)
Dept: UROLOGY | Facility: CLINIC | Age: 44
End: 2019-04-23

## 2019-04-23 ENCOUNTER — OFFICE VISIT (OUTPATIENT)
Dept: UROLOGY | Facility: CLINIC | Age: 44
End: 2019-04-23
Payer: COMMERCIAL

## 2019-04-23 VITALS
WEIGHT: 187.38 LBS | BODY MASS INDEX: 30.11 KG/M2 | HEIGHT: 66 IN | DIASTOLIC BLOOD PRESSURE: 85 MMHG | HEART RATE: 75 BPM | SYSTOLIC BLOOD PRESSURE: 127 MMHG | RESPIRATION RATE: 18 BRPM

## 2019-04-23 DIAGNOSIS — N50.812 PAIN IN LEFT TESTICLE: Primary | ICD-10-CM

## 2019-04-23 DIAGNOSIS — N45.1 LEFT EPIDIDYMITIS: ICD-10-CM

## 2019-04-23 LAB
BILIRUB SERPL-MCNC: NORMAL MG/DL
BLOOD URINE, POC: NORMAL
COLOR, POC UA: YELLOW
GLUCOSE UR QL STRIP: NORMAL
KETONES UR QL STRIP: NORMAL
LEUKOCYTE ESTERASE URINE, POC: NORMAL
NITRITE, POC UA: NORMAL
PH, POC UA: 6
PROTEIN, POC: NORMAL
SPECIFIC GRAVITY, POC UA: 1.03
UROBILINOGEN, POC UA: 0.2

## 2019-04-23 PROCEDURE — 99999 PR PBB SHADOW E&M-EST. PATIENT-LVL III: ICD-10-PCS | Mod: PBBFAC,,, | Performed by: UROLOGY

## 2019-04-23 PROCEDURE — 99203 OFFICE O/P NEW LOW 30 MIN: CPT | Mod: 25,S$GLB,, | Performed by: UROLOGY

## 2019-04-23 PROCEDURE — 3008F PR BODY MASS INDEX (BMI) DOCUMENTED: ICD-10-PCS | Mod: CPTII,S$GLB,, | Performed by: UROLOGY

## 2019-04-23 PROCEDURE — 99999 PR PBB SHADOW E&M-EST. PATIENT-LVL III: CPT | Mod: PBBFAC,,, | Performed by: UROLOGY

## 2019-04-23 PROCEDURE — 81002 POCT URINE DIPSTICK WITHOUT MICROSCOPE: ICD-10-PCS | Mod: S$GLB,,, | Performed by: UROLOGY

## 2019-04-23 PROCEDURE — 81002 URINALYSIS NONAUTO W/O SCOPE: CPT | Mod: S$GLB,,, | Performed by: UROLOGY

## 2019-04-23 PROCEDURE — 3008F BODY MASS INDEX DOCD: CPT | Mod: CPTII,S$GLB,, | Performed by: UROLOGY

## 2019-04-23 PROCEDURE — 99203 PR OFFICE/OUTPT VISIT, NEW, LEVL III, 30-44 MIN: ICD-10-PCS | Mod: 25,S$GLB,, | Performed by: UROLOGY

## 2019-04-23 RX ORDER — DOXYCYCLINE 100 MG/1
100 CAPSULE ORAL 2 TIMES DAILY
Qty: 28 CAPSULE | Refills: 0 | Status: SHIPPED | OUTPATIENT
Start: 2019-04-23 | End: 2019-06-06 | Stop reason: SDUPTHER

## 2019-04-23 NOTE — TELEPHONE ENCOUNTER
----- Message from Jhon Giron sent at 4/23/2019  8:31 AM CDT -----  Contact: self   Placed call to pod, patient miss call from your office please call back at [phone] (sitf)

## 2019-04-23 NOTE — PATIENT INSTRUCTIONS
Scrotal support, rest from physical activity, scheduled advil/IBU (400-600mg) 3x/day with meals x1 week, doxycycline 2x/day x 2 weeks.   Check scrotal US to make sure nothing else going on  If not feeling better in 3-4 weeks can return to see nurse practitioner    Epididymitis  Inflammation of the epididymis can cause pain and swelling in your scrotum. The epididymis is a small tube next to the testicle that stores sperm. Epididymitis is usually caused by an infection. In sexually active men, it can caused by a sexually transmitted disease (STD) such as chlamydia or gonorrhea. In boys and in men over 40, it can be from bacteria from other parts of the urinary tract (not an STD). Can also be from inflammation  Symptoms may begin with pain in the lower belly (abdomen) or low back. The pain then spreads down into the scrotum. Usually only one side is affected. The testicle and scrotum swell and become very painful and red. You may have fever and a burning when passing urine. Sometimes you may have a discharge from the penis.  Treatment is with antibiotics, and anti-inflammatory and pain medicines. The condition should get better over the first few days of treatment. But it will take several weeks for all the swelling and discomfort to go away. If your healthcare provider suspects that an STD is the cause, your sexual partners may need to be treated.  Home care  The following will help you care for yourself at home:  · Support the scrotum. When lying down, place a rolled towel under the scrotum. When walking, use an athletic supporter or 2 pairs of jockey-style underwear.  · To relieve pain, put ice packs on the inflamed area. You can make your own ice pack by putting ice cubes in a sealed plastic bag wrapped in a thin towel.  · You may use over-the-counter medicines to control pain, unless another medicine was given. If you have chronic liver or kidney disease, talk with your healthcare provider before taking these  medicines. Also talk with your provider if you've ever had a stomach ulcer or GI bleeding.  · Rest in bed for the first few days until the fever, pain, and swelling get better. It may take several weeks for all of the swelling to go away.  · Constipation can make you strain. This makes the pain worse. Avoid constipation by eating natural laxatives such as prunes, fresh fruits, and whole-grain cereals. If necessary, use a mild over-the-counter laxative for constipation. Mineral oil can be used to keep the stools soft.  · Do not have sex until you have finished all treatment and all symptoms have cleared.  · Take all medicine as directed. Do not miss any doses and do not stop early even if you feel better.  Follow-up care  Follow up with your healthcare provider, or as advised, to be sure you are responding properly to treatment. If a culture was taken, you may call for the result as directed. A culture test can ensure that you are on the correct antibiotic.   When to seek medical advice  Call your healthcare provider right away if any of these occur:  · Fever of 100.4ºF (38ºC), or as directed by your healthcare provider  · Increasing pain or swelling of the testicle after starting treatment  · Pressure or pain in your bladder that gets worse  · Unable to pass urine for 8 hours  Date Last Reviewed: 9/1/2016  © 3994-8278 [employer]. [address] [place] [address], [place] [address]. All rights reserved. This information is not intended as a substitute for professional medical care. Always follow your healthcare professional's instructions.

## 2019-05-02 NOTE — PROGRESS NOTES
[facility] Urology New Patient/H&P:    Jem Alvarez is a 43 y.o. male who presents for evaluation of testicular pain    He has a history of right testicular torsion at age 8 with right orchiectomy and left orchiopexy at that time.  He later had 3 children and had a vasectomy in 2005.  Last Wednesday morning, he felt a pressure as if it was squeezing or pulling on his left testis.  He felt like he was kicked in that area and had mild nausea.  Aleve and Excedrin took the edge off but he still had pressure.  He had some cramping above the testicle all weekend, especially when he feels like his testicle is regulating temperature and pulls up towards his body.  In the last 3 days he still having some pressure, the testicle itself does not hurt to the touch, just feels a sensation of pressure.  Denies dysuria hematuria.  Denies ejaculatory, perineal, perirectal pain.    Urinalysis dipstick is negative.  He has a history of anxiety and panic attacks and was given BuSpar in January, but notes he is not taking his anxiety meds.  Monogamous, , no high-risk sexual encounters or concern for STI    Past Medical History:   Diagnosis Date    Anxiety        Past Surgical History:   Procedure Laterality Date    None      REPAIR OF TORSION OF TESTICLE      VASECTOMY         Family History   Problem Relation Age of Onset    Hypertension Unknown     No Known Problems Mother     Hypertension Father     Arthritis Father     Stroke Maternal Grandmother     No Known Problems Maternal Grandfather     Stroke Paternal Grandmother     No Known Problems Paternal Grandfather        Social History     Socioeconomic History    Marital status:      Spouse name: Not on file    Number of children: Not on file    Years of education: Not on file    Highest education level: Not on file   Occupational History    Occupation:    Social Needs    Financial resource strain: Not on file    Food  insecurity:     Worry: Not on file     Inability: Not on file    Transportation needs:     Medical: Not on file     Non-medical: Not on file   Tobacco Use    Smoking status: Former Smoker     Packs/day: 0.50     Types: Cigarettes     Start date: 2015     Last attempt to quit: 10/19/2018     Years since quittin.5    Smokeless tobacco: Never Used   Substance and Sexual Activity    Alcohol use: Yes     Comment: seldom    Drug use: No    Sexual activity: Yes   Lifestyle    Physical activity:     Days per week: Not on file     Minutes per session: Not on file    Stress: Not on file   Relationships    Social connections:     Talks on phone: Not on file     Gets together: Not on file     Attends Muslim service: Not on file     Active member of club or organization: Not on file     Attends meetings of clubs or organizations: Not on file     Relationship status: Not on file   Other Topics Concern    Not on file   Social History Narrative    Not on file       Review of patient's allergies indicates:  No Known Allergies    Medications Reviewed: see MAR    ROS:    Constitutional: denies fevers, chills, night sweats, fatigue, malaise  Respiratory: negative for cough, shortness of breath, wheezing, dyspnea.  Cardiovascular:  negative for chest pain, varicose veins, ankle swelling, palpitations, syncope.  GI: negative for abdominal pain, heartburn, indigestion, nausea, vomiting, constipation, diarrhea, blood in stool.   Urology: as noted above in HPI  Endocrinology: negative for cold intolerance, excessive thirst, not feeling tired/sluggish, no heat intolerance.   Hematology/Lymph: negative for easy bleeding, easy bruising, swollen glands.  Musculoskeletal: negative for back pain, joint pain, joint swelling, neck pain.  Allergy-Immunology: negative for seasonal allergies, negative for unusual infections.   Skin: negative for boils, breast lumps, hives, itching, rash.   Neurology: negative for, dizziness,  "headache, tingling/numbness, tremors.   Psych:++ anxiety    PHYSICAL EXAM:    Vitals:    04/23/19 1008   BP: 127/85   Pulse: 75   Resp: 18     Body mass index is 30.25 kg/m². Weight: 85 kg (187 lb 6.3 oz) Height: 5' 6" (167.6 cm)       General: Alert, cooperative, no distress, appears stated age  Head: Normocephalic, without obvious abnormality, atraumatic  Neck: no masses, no thyromegaly, no lymphadenopathy  Eyes: PERRL, conjunctiva/corneas clear  Lungs: Respirations unlabored, normal effort, no accessory muscle use  CV: Warm and well perfused extremities  Abdomen: Soft, non-tender, no CVA tenderness, no hepatosplenomegaly, no hernia  Penis: phallus normal, circumcised, well cared for, no plaques or lesions.   Scrotum: no cysts, no lesions, no rash, no hydrocele.   Epididymes:  Left epididymis tender to palpation, mildly enlarged  Testes:  Absent right testis, normal descended left testis without mass or tenderness   Urethra: palpably normal with orthotopic meatus of normal size    Extremities: Extremities normal, atraumatic, no cyanosis or edema  Skin: Normal color, texture, and turgor, no rashes or lesions  Psych: Appropriate, well oriented, normal affect, normal mood  Neuro: Non-focal    Lab Results   Component Value Date    PSA 0.40 01/17/2017       LABS:    Recent Results (from the past 336 hour(s))   POCT URINE DIPSTICK WITHOUT MICROSCOPE    Collection Time: 04/23/19 10:11 AM   Result Value Ref Range    Color, UA yellow     Spec Grav UA 1.030     pH, UA 6     WBC, UA neg     Nitrite, UA neg     Protein neg     Glucose, UA neg     Ketones, UA neg     Urobilinogen, UA 0.2     Bilirubin neg     Blood, UA neg          Assessment/Diagnosis:    1. Pain in left testicle  POCT URINE DIPSTICK WITHOUT MICROSCOPE    US Scrotum And Testicles   2. Left epididymitis  US Scrotum And Testicles       Plans:    Clinical exam and history are most consistent with epididymitis.  Educated on disease process and treatment " algorithm.  Scrotal support, rest from physical activity, scheduled advil/IBU (400-600mg) 3x/day with meals x1 week, doxycycline 2x/day x 2 weeks.   Check scrotal US to rule out other intrascrotal pathology.  Will chart check results.  Reassurance provided  If not improved in 3-4 weeks can return to see nurse practitioner for re-evaluation, otherwise RTC p.r.n.  Has baseline normal PSA at age 42, and should resume annual PSA screening at age 52

## 2019-05-06 ENCOUNTER — TELEPHONE (OUTPATIENT)
Dept: UROLOGY | Facility: CLINIC | Age: 44
End: 2019-05-06

## 2019-05-06 NOTE — TELEPHONE ENCOUNTER
He says he is still having scrotal discomfort.  He is completed the abx.    Discussed recommendations from LOV.  He says he is only using scrotal support occasionally.  Recommend he use this always.  Ibuprofen for discomfort.  Call with progress report end of week if no better.

## 2019-05-06 NOTE — TELEPHONE ENCOUNTER
----- Message from Lynnette Wesley sent at 5/6/2019 12:11 PM CDT -----    Type:  Same Day Appointment Request    Caller is requesting a same day appointment.  Caller declined first available appointment listed below.      Name of Caller:  pt  When is the first available appointment?    May  20  Symptoms:  Infection  On  antibiotics  // not  Clearing  infection  Best Call Back Number:[phone]  Additional Information:   Wants to  Be fitted  In  Today  Or  tomorrow

## 2019-05-31 ENCOUNTER — TELEPHONE (OUTPATIENT)
Dept: UROLOGY | Facility: CLINIC | Age: 44
End: 2019-05-31

## 2019-05-31 NOTE — TELEPHONE ENCOUNTER
----- Message from Camila Ruiz sent at 5/31/2019 12:57 PM CDT -----  Contact: self   Type:  Sooner Apoointment Request    Caller is requesting a sooner appointment.  Caller declined first available appointment listed below.  Caller will not accept being placed on the waitlist and is requesting a message be sent to doctor.    Name of Caller:  Patient   When is the first available appointment?  7/19  Symptoms: pain, did not say what type of pain   Best Call Back Number:  [phone] (home)   Additional Information: patient is requesting a earlier date for a follow up appt next week some time or if a rx can be called in to pharmacy. He was taking antibiotics for pain and is still experiencing pain.

## 2019-06-06 ENCOUNTER — OFFICE VISIT (OUTPATIENT)
Dept: UROLOGY | Facility: CLINIC | Age: 44
End: 2019-06-06
Payer: COMMERCIAL

## 2019-06-06 VITALS
HEIGHT: 66 IN | HEART RATE: 71 BPM | WEIGHT: 187 LBS | BODY MASS INDEX: 30.05 KG/M2 | SYSTOLIC BLOOD PRESSURE: 130 MMHG | DIASTOLIC BLOOD PRESSURE: 89 MMHG

## 2019-06-06 DIAGNOSIS — R53.83 FATIGUE, UNSPECIFIED TYPE: ICD-10-CM

## 2019-06-06 DIAGNOSIS — E34.9 TESTOSTERONE INSUFFICIENCY: ICD-10-CM

## 2019-06-06 DIAGNOSIS — N50.819 TESTICLE PAIN: Primary | ICD-10-CM

## 2019-06-06 LAB
BILIRUB SERPL-MCNC: NORMAL MG/DL
BLOOD URINE, POC: NORMAL
COLOR, POC UA: NORMAL
GLUCOSE UR QL STRIP: NORMAL
KETONES UR QL STRIP: NORMAL
LEUKOCYTE ESTERASE URINE, POC: NORMAL
NITRITE, POC UA: NORMAL
PH, POC UA: 5.5
PROTEIN, POC: NORMAL
SPECIFIC GRAVITY, POC UA: 1.03
UROBILINOGEN, POC UA: 0.2

## 2019-06-06 PROCEDURE — 99999 PR PBB SHADOW E&M-EST. PATIENT-LVL III: CPT | Mod: PBBFAC,,, | Performed by: NURSE PRACTITIONER

## 2019-06-06 PROCEDURE — 81002 URINALYSIS NONAUTO W/O SCOPE: CPT | Mod: S$GLB,,, | Performed by: NURSE PRACTITIONER

## 2019-06-06 PROCEDURE — 3008F BODY MASS INDEX DOCD: CPT | Mod: CPTII,S$GLB,, | Performed by: NURSE PRACTITIONER

## 2019-06-06 PROCEDURE — 99214 PR OFFICE/OUTPT VISIT, EST, LEVL IV, 30-39 MIN: ICD-10-PCS | Mod: 25,S$GLB,, | Performed by: NURSE PRACTITIONER

## 2019-06-06 PROCEDURE — 3008F PR BODY MASS INDEX (BMI) DOCUMENTED: ICD-10-PCS | Mod: CPTII,S$GLB,, | Performed by: NURSE PRACTITIONER

## 2019-06-06 PROCEDURE — 99214 OFFICE O/P EST MOD 30 MIN: CPT | Mod: 25,S$GLB,, | Performed by: NURSE PRACTITIONER

## 2019-06-06 PROCEDURE — 99999 PR PBB SHADOW E&M-EST. PATIENT-LVL III: ICD-10-PCS | Mod: PBBFAC,,, | Performed by: NURSE PRACTITIONER

## 2019-06-06 PROCEDURE — 81002 POCT URINE DIPSTICK WITHOUT MICROSCOPE: ICD-10-PCS | Mod: S$GLB,,, | Performed by: NURSE PRACTITIONER

## 2019-06-06 RX ORDER — DOXYCYCLINE 100 MG/1
100 CAPSULE ORAL 2 TIMES DAILY
Qty: 30 CAPSULE | Refills: 0 | Status: SHIPPED | OUTPATIENT
Start: 2019-06-06 | End: 2019-06-21

## 2019-06-06 RX ORDER — CIPROFLOXACIN 500 MG/1
500 TABLET ORAL 2 TIMES DAILY
Qty: 30 TABLET | Refills: 0 | Status: SHIPPED | OUTPATIENT
Start: 2019-06-06 | End: 2019-06-21

## 2019-06-06 NOTE — PROGRESS NOTES
[facility] Note  Staff: JED Navas-C    PCP: Cristofer Boyd    Chief Complaint: Bilateral testicle pain-followup    Subjective:        HPI: Jem Alvarez is a 44 y.o. male presents today with c/o continuous bilateral testicle pain.  The pt was initially seen by Dr. Devine on 4/23/19 for evaluation of testicular pain.  TODAY, pt states he has finished the previous recommended antibx regimen from Dr. Devine and the antibxs did help, but pain returned after coming off the antibiotics.    He has a history of right testicular torsion at age 8 with right orchiectomy and left orchiopexy at that time.  He later had 3 children and had a vasectomy in 2005.  Last Wednesday morning, he felt a pressure as if it was squeezing or pulling on his left testis.  He felt like he was kicked in that area and had mild nausea.  Aleve and Excedrin took the edge off but he still had pressure.  He had some cramping above the testicle all weekend, especially when he feels like his testicle is regulating temperature and pulls up towards his body.  In the last 3 days he still having some pressure, the testicle itself does not hurt to the touch, just feels a sensation of pressure.  Denies dysuria hematuria.  Denies ejaculatory, perineal, perirectal pain.     Urinalysis dipstick is negative.  He has a history of anxiety and panic attacks and was given BuSpar in January, but notes he is not taking his anxiety meds.  Monogamous, , no high-risk sexual encounters or concern for STI    Last PSA Screening:   Lab Results   Component Value Date    PSA 0.40 01/17/2017     REVIEW OF SYSTEMS:  A comprehensive 10 system review was performed and is negative except as noted above in HPI    PMHx:  Past Medical History:   Diagnosis Date    Anxiety      PSHx:  Past Surgical History:   Procedure Laterality Date    None      REPAIR OF TORSION OF TESTICLE      VASECTOMY       Allergies:  Patient has no known  allergies.    Medications: reviewed   Objective:     Vitals:    06/06/19 1446   BP: 130/89   Pulse: 71     General:WDWN in NAD  Eyes: PERRLA, normal conjunctiva  Respiratory: no increased work on breathing, clear to auscultation  Cardiovascular: regular rate and rhythm. No obvious extremity edema.  GI: palpation of masses. No tenderness. No hepatosplenomegaly to palpation.  Musculoskeletal: normal range of motion of bilateral upper extremities. Normal muscle strength and tone.  Skin: no obvious rashes or lesions. No tightening of skin noted.  Neurologic: CN grossly normal. Normal sensation.   Psychiatric: awake, alert and oriented x 3. Mood and affect normal. Cooperative.    :  Inspection of anus and perineum normal  No scrotal rashes, cysts or lesions  Epididymis normal in size, no tenderness  Testes normal and size, equal size bilaterally, no masses  Urethral meatus normal without discharge  Penis is circumcised,    No bilateral inguinal hernias noted     LABS REVIEW:  UA today:  Color:Clear, Yellow  Spec. Grav.  1.030  PH  5.5  Negative for leukocytes, nitrates, protein, glucose, ketones, urobili, bili, and blood.    Cr:   Lab Results   Component Value Date    CREATININE 1.14 12/27/2018     Assessment:       1. Testicle pain    2. Fatigue, unspecified type    3. Testosterone insufficiency          Plan:   Bilateral testicle pain, unrelieved:    1.  US of Scrotum and Testicles to be performed at [facility] today per pt's request.  Orders sent with the pt today to be completed.    2.  In the meantime I will go ahead and prescribe the following regimen:  Cipro 500 mg BID x 15 days  Vibra-tabs 100 mg BID x 15 days for a total of 30 days of regimen.    3.  Pt also requesting testosterone levels to be performed due to one testicle, therefore PSA and Testosterone panel ordered.    The following was thoroughly reiterated to this patient during ov today:  The following instructions were given to the patient to  assist with discomfort:  -Use jockstrap or the best supportive underwear he can get for relief of pressure.  -Alternate ice packs/heating pad to areas.  -Take OTC anti-inflammatories  -Sit in a warm tub of water greater than 15 minutes  -Elevate Scrotal Sac    [facility]: Pendray Frey, TRINHP-C

## 2019-06-06 NOTE — PATIENT INSTRUCTIONS
Testicular Pain, Unclear Cause  You have had pain in one or both testicles. Based on your exam today, the exact cause of your pain is not certain. But your condition does not appear to be dangerous. Testicles are very sensitive. Even a small injury can cause quite a bit of pain. Other possible causes of testicular pain include kidney stones, cysts, mumps, inflammatory conditions, chronic conditions, hernia, infection, and a twisted testicle.  Certain tests may be done to rule out an underlying problem causing the pain. Nothing conclusive was found today. Most likely, the pain will go away on its own. If it doesnt, you may need more tests.    Home care  Medicine may be prescribed to help relieve pain and swelling. This may be an over-the-counter pain reliever or prescription pain medication. Take all medicine as directed.  The following are general care guidelines:  · To relieve pain and swelling, apply an ice pack wrapped in a thin towel for 10 minutes at a time. Continue this on and off for 1 to 2 days.  · When lying down, place a small rolled towel under your scrotum. When moving around, wear a jockstrap (athletic supporter) or supportive underwear. These will help support and protect your testicles.  · If it hurts to walk, walk as little as possible until you feel better.  · Avoid strenuous activity until you feel better.  · Do not have sex until you feel better.  · If you have severe pain in the testicle, seek care right away. Delay may lead to permanent loss of the testicles function.  Follow-up care  Follow up with your healthcare provider, or as advised.  When to seek medical advice  Call your healthcare provider right away if any of these occur:  · Fever of 100.4°F (38°C) or higher  · Worsening of the pain or severe pain  · Swelling of the testicle or scrotum  · A lump in the scrotum  · Warm and red scrotum (signs of infection)  · Nausea and vomiting  · Pain or swelling in abdomen  · Trouble  urinating  · Numbness or weakness in the leg  · Shrinking of the testicle  · Blood in your urine  Date Last Reviewed: 10/1/2016  © 8644-6681 [employer]. [address] [place] [address], [place] [address]. All rights reserved. This information is not intended as a substitute for professional medical care. Always follow your healthcare professional's instructions.

## 2019-06-07 ENCOUNTER — TELEPHONE (OUTPATIENT)
Dept: UROLOGY | Facility: CLINIC | Age: 44
End: 2019-06-07

## 2019-06-07 NOTE — TELEPHONE ENCOUNTER
See previous encounter.    ----- Message from Jennifer Renee sent at 6/7/2019  2:52 PM CDT -----  Contact: patient  Type: Needs Medical Advice    Who Called:  patient  Symptoms (please be specific):  na  How long has patient had these symptoms:  tova  Pharmacy name and phone #:  [facility]  Best Call Back Number: [phone] (home)     Additional Information: Patient states that he went to get ultrasound yesterday and would like to get the results.  Please call to schedule. Thanks!

## 2019-06-07 NOTE — TELEPHONE ENCOUNTER
Patient had testicular 6/6/19 at [facility].     Clinical history is pain.  The patient has had the right testicle removed.   The left testicle is normal in size and echogenicity.  There is normal flow.  The left testicle measures 5.5x2.0x3.7cm.  There are no solid or cystic intratesticular mass.  The left epididymis is normal. There is no hydrocele.      Impression:  Normal sonographic appearance of the left testicle.  Prior right orchiectomy.    Report sent to scanning.

## 2019-06-07 NOTE — TELEPHONE ENCOUNTER
Please call this pt and inform him of US results done yesterday:    Normal appearance and blood flow to the LEFT testicle at this time.  No masses, no nodules or infection noted.

## 2019-06-09 NOTE — TELEPHONE ENCOUNTER
Looks like Marcella/Star already advised him of his negative results. Confirm with patient I have reviewed US and agree it is normal. No abnormalities nor signs of infection or residual epididymitis.  When having discomfort, rest from physical activity, use scrotal support, and NSAIDs

## 2019-06-10 ENCOUNTER — TELEPHONE (OUTPATIENT)
Dept: UROLOGY | Facility: CLINIC | Age: 44
End: 2019-06-10

## 2019-06-10 ENCOUNTER — LAB VISIT (OUTPATIENT)
Dept: LAB | Facility: HOSPITAL | Age: 44
End: 2019-06-10
Attending: NURSE PRACTITIONER
Payer: COMMERCIAL

## 2019-06-10 DIAGNOSIS — N50.819 TESTICLE PAIN: ICD-10-CM

## 2019-06-10 DIAGNOSIS — R53.83 FATIGUE, UNSPECIFIED TYPE: ICD-10-CM

## 2019-06-10 LAB
PROSTATE SPECIFIC ANTIGEN, TOTAL: 0.24 NG/ML (ref 0–4)
PSA FREE MFR SERPL: 62.5 %
PSA FREE SERPL-MCNC: 0.15 NG/ML (ref 0.01–1.5)

## 2019-06-10 PROCEDURE — 84154 ASSAY OF PSA FREE: CPT

## 2019-06-10 PROCEDURE — 36415 COLL VENOUS BLD VENIPUNCTURE: CPT | Mod: PO

## 2019-06-10 PROCEDURE — 82040 ASSAY OF SERUM ALBUMIN: CPT

## 2019-06-10 NOTE — TELEPHONE ENCOUNTER
Dr Devine has reviewed your ultrasound and agree it is normal. No abnormalities nor signs of infection or residual epididymitis.  When having discomfort, rest from physical activity, use scrotal support, and NSAIDs.    Thanks

## 2019-06-12 ENCOUNTER — PATIENT MESSAGE (OUTPATIENT)
Dept: UROLOGY | Facility: CLINIC | Age: 44
End: 2019-06-12

## 2019-06-12 NOTE — TELEPHONE ENCOUNTER
Please advise pt of the following:    Cipro can have certain side affects, most common are tendon pain, headaches, irritability, numbness, burning pain.  Therefore we cannot rule out any rash or red spots either.     We recommend he STOP the Cipro at this time.  Try taking Benadryl 25-50 mg over the counter for this and/or see his PCP or a Dermatologist for further evaluation of the rash/red spots.  All medications can have a potential for different types of allergic reactions, but we would not be able to confirm if Cipro would be the exact cause of this.    I would not recommend starting a new antibiotic at this time until the reaction/or current rash issue has been resolved.  Then he can call us back and we can prescribe something else at that time.  Thanks.

## 2019-06-12 NOTE — TELEPHONE ENCOUNTER
Contacted patient to inform him of recommendations. He understands but states that he doesn't want to discontinue antibiotics because they're helping his symptoms. He says that he will try benadryl to see if it helps with the red spots on his body.

## 2019-06-14 LAB
ALBUMIN SERPL-MCNC: 4.5 G/DL (ref 3.6–5.1)
SHBG SERPL-SCNC: 24 NMOL/L (ref 10–50)
TESTOST FREE SERPL-MCNC: 56.6 PG/ML (ref 46–224)
TESTOST SERPL-MCNC: 343 NG/DL (ref 250–1100)
TESTOSTERONE.FREE+WB SERPL-MCNC: 116.3 NG/DL (ref 110–575)

## 2019-07-19 ENCOUNTER — OFFICE VISIT (OUTPATIENT)
Dept: UROLOGY | Facility: CLINIC | Age: 44
End: 2019-07-19
Payer: COMMERCIAL

## 2019-07-19 VITALS
WEIGHT: 193.13 LBS | HEART RATE: 88 BPM | TEMPERATURE: 98 F | SYSTOLIC BLOOD PRESSURE: 130 MMHG | BODY MASS INDEX: 31.04 KG/M2 | DIASTOLIC BLOOD PRESSURE: 89 MMHG | RESPIRATION RATE: 18 BRPM | HEIGHT: 66 IN

## 2019-07-19 DIAGNOSIS — R10.2 PERINEAL PAIN: ICD-10-CM

## 2019-07-19 DIAGNOSIS — N50.819 TESTICLE PAIN: Primary | ICD-10-CM

## 2019-07-19 LAB
BILIRUB SERPL-MCNC: NORMAL MG/DL
BLOOD URINE, POC: NORMAL
COLOR, POC UA: YELLOW
GLUCOSE UR QL STRIP: NORMAL
KETONES UR QL STRIP: NORMAL
LEUKOCYTE ESTERASE URINE, POC: NORMAL
NITRITE, POC UA: NORMAL
PH, POC UA: 5.5
PROTEIN, POC: NORMAL
SPECIFIC GRAVITY, POC UA: 1.03
UROBILINOGEN, POC UA: 0.2

## 2019-07-19 PROCEDURE — 81002 URINALYSIS NONAUTO W/O SCOPE: CPT | Mod: S$GLB,,, | Performed by: NURSE PRACTITIONER

## 2019-07-19 PROCEDURE — 3008F PR BODY MASS INDEX (BMI) DOCUMENTED: ICD-10-PCS | Mod: CPTII,S$GLB,, | Performed by: NURSE PRACTITIONER

## 2019-07-19 PROCEDURE — 99999 PR PBB SHADOW E&M-EST. PATIENT-LVL III: CPT | Mod: PBBFAC,,, | Performed by: NURSE PRACTITIONER

## 2019-07-19 PROCEDURE — 99213 PR OFFICE/OUTPT VISIT, EST, LEVL III, 20-29 MIN: ICD-10-PCS | Mod: 25,S$GLB,, | Performed by: NURSE PRACTITIONER

## 2019-07-19 PROCEDURE — 3008F BODY MASS INDEX DOCD: CPT | Mod: CPTII,S$GLB,, | Performed by: NURSE PRACTITIONER

## 2019-07-19 PROCEDURE — 99999 PR PBB SHADOW E&M-EST. PATIENT-LVL III: ICD-10-PCS | Mod: PBBFAC,,, | Performed by: NURSE PRACTITIONER

## 2019-07-19 PROCEDURE — 99213 OFFICE O/P EST LOW 20 MIN: CPT | Mod: 25,S$GLB,, | Performed by: NURSE PRACTITIONER

## 2019-07-19 PROCEDURE — 81002 POCT URINE DIPSTICK WITHOUT MICROSCOPE: ICD-10-PCS | Mod: S$GLB,,, | Performed by: NURSE PRACTITIONER

## 2019-07-19 NOTE — PROGRESS NOTES
[facility] Note  Staff: DAVIDE NavasC    PCP: Cristofer Boyd    Chief Complaint: Routine recheck-Testicle pain, perineal    Subjective:        HPI: Jem Alvarez is a 44 y.o. male presents today for follow-up.    Testicle pain has improved but still remains with chronic perineal pain that will sometimes shoot to the back of his upper thigh and groin areas.    The pt was last seen by me on 06/06/2019 for testicle pain.  Pt has been on multiple regimens of antibiotic therapy for his complaints as well as comfort measures and now taking Ibuprofen 600 mg TID for his symptoms.    US of Scrotum/Testicles done at [facility] on 06/06/2019:  IMPRESSION:  (Dr. Devine has already reviewed imaging)  Normal appearance of the Left Testicle  Prior right orchiectomy    He has a history of right testicular torsion at age 8 with right orchiectomy and left orchiopexy at that time.  He later had 3 children and had a vasectomy in 2005.  Last Wednesday morning, he felt a pressure as if it was squeezing or pulling on his left testis.  He felt like he was kicked in that area and had mild nausea.  Aleve and Excedrin took the edge off but he still had pressure.  He had some cramping above the testicle all weekend, especially when he feels like his testicle is regulating temperature and pulls up towards his body.  In the last 3 days he still having some pressure, the testicle itself does not hurt to the touch, just feels a sensation of pressure.  Denies dysuria hematuria.  Denies ejaculatory, perineal, perirectal pain.     Urinalysis dipstick is negative.  He has a history of anxiety and panic attacks and was given BuSpar in January, but notes he is not taking his anxiety meds.  Monogamous, , no high-risk sexual encounters or concern for STI    REVIEW OF SYSTEMS:  A comprehensive 10 system review was performed and is negative except as noted above in HPI    PMHx:  Past Medical History:   Diagnosis Date     Anxiety      PSHx:  Past Surgical History:   Procedure Laterality Date    None      REPAIR OF TORSION OF TESTICLE      VASECTOMY       Allergies:  Patient has no known allergies.    Medications: reviewed   Objective:     Vitals:    07/19/19 1505   BP: 130/89   Pulse: 88   Resp: 18   Temp: 98.4 °F (36.9 °C)     General:WDWN in NAD  Eyes: PERRLA, normal conjunctiva  Respiratory: no increased work on breathing, clear to auscultation  Cardiovascular: regular rate and rhythm. No obvious extremity edema.  GI: palpation of masses. No tenderness. No hepatosplenomegaly to palpation.  Musculoskeletal: normal range of motion of bilateral upper extremities. Normal muscle strength and tone.  Skin: no obvious rashes or lesions. No tightening of skin noted.  Neurologic: CN grossly normal. Normal sensation.   Psychiatric: awake, alert and oriented x 3. Mood and affect normal. Cooperative.    Assessment:       1. Testicle pain    2. Perineal pain          Plan:   Chronic Perineal/Testicle pain:    Pt to follow recommended Comfort measures as previously discussed.  Use Ibuprofen prn    Pt to be scheduled with Dr. Devine to re-evaluate issues in 1-2 months and to discuss further options for treatment of his chronic issues at this time.    [facility]: Active    Marcella Frey, TRINHP-C

## 2019-08-26 ENCOUNTER — OFFICE VISIT (OUTPATIENT)
Dept: UROLOGY | Facility: CLINIC | Age: 44
End: 2019-08-26
Payer: COMMERCIAL

## 2019-08-26 VITALS
HEIGHT: 66 IN | SYSTOLIC BLOOD PRESSURE: 120 MMHG | RESPIRATION RATE: 18 BRPM | WEIGHT: 191.56 LBS | DIASTOLIC BLOOD PRESSURE: 84 MMHG | HEART RATE: 76 BPM | BODY MASS INDEX: 30.79 KG/M2

## 2019-08-26 DIAGNOSIS — G89.4 CHRONIC PROSTATITIS/CHRONIC PELVIC PAIN SYNDROME: Primary | ICD-10-CM

## 2019-08-26 DIAGNOSIS — N41.1 CHRONIC PROSTATITIS/CHRONIC PELVIC PAIN SYNDROME: Primary | ICD-10-CM

## 2019-08-26 PROCEDURE — 3008F PR BODY MASS INDEX (BMI) DOCUMENTED: ICD-10-PCS | Mod: CPTII,S$GLB,, | Performed by: UROLOGY

## 2019-08-26 PROCEDURE — 3008F BODY MASS INDEX DOCD: CPT | Mod: CPTII,S$GLB,, | Performed by: UROLOGY

## 2019-08-26 PROCEDURE — 99214 OFFICE O/P EST MOD 30 MIN: CPT | Mod: S$GLB,,, | Performed by: UROLOGY

## 2019-08-26 PROCEDURE — 99999 PR PBB SHADOW E&M-EST. PATIENT-LVL III: ICD-10-PCS | Mod: PBBFAC,,, | Performed by: UROLOGY

## 2019-08-26 PROCEDURE — 99999 PR PBB SHADOW E&M-EST. PATIENT-LVL III: CPT | Mod: PBBFAC,,, | Performed by: UROLOGY

## 2019-08-26 PROCEDURE — 99214 PR OFFICE/OUTPT VISIT, EST, LEVL IV, 30-39 MIN: ICD-10-PCS | Mod: S$GLB,,, | Performed by: UROLOGY

## 2019-09-01 NOTE — PROGRESS NOTES
[facility] Urology Progress Note    Jem Alvarez is a 44 y.o. male who presents for follow up of groin/testis pain    He has a history of right testicular torsion at age 8 with right orchiectomy and left orchiopexy at that time. He later had 3 children and had a vasectomy in 2005.     At time of eval in Apr 2019, noted pressure squeezing or pulling on his left testis. He felt like he was kicked in that area and had mild nausea. Aleve and Excedrin took the edge off but he still had pressure. He had some cramping above the testicle all weekend, especially when he feels like his testicle is regulating temperature and pulls up towards his body.   No dysuria hematuria prostatitis symptoms and normal UA. He has a history of anxiety and panic attacks and was given BuSpar in January, but notes he is not taking his anxiety meds.   Monogamous, , no high-risk sexual encounters or concern for STI  Concern for L epididymitis on exam given mild enlargement and point tenderness.  Treatment helped but pain returned off abx. Saw NP and got 15d cipro followed by 15d vibramycin. Advised on conservative recs. Scrotal US negative    At last fu with HELADIO O'Meghan 7/19/19  Testicle pain has improved but still remains with chronic perineal pain that will sometimes shoot to the back of his upper thigh and groin areas.    He returns today noting that the pain persists behind his testicles.  Pain does seem to extend between testicles and rectum and is worse 20 sitting for long periods of time.  He feels on the back of the buttocks and thighs as well.  This discomfort is relieved with lying on the floor and stretching.  Does still have a significant amount of stress and anxiety.  His son is battling cancer.  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  The more he is on his feet the better he feels.  Sitting for long periods of time causes perineal discomfort.  Pain is relieved after  "intercourse.  Has taken some anti-inflammatories were prefers not to take medications.  Is medicated for his anxiety.  Denies any accompanying urinary symptoms.  AUA symptom score 2/0, delighted (1:  Frequency, 1:  Nocturia.)      ROS: A comprehensive 10 system review was performed and is negative except as noted above in HPI    PHYSICAL EXAM:    Vitals:    08/26/19 1450   BP: 120/84   Pulse: 76   Resp: 18     Body mass index is 30.92 kg/m². Weight: 86.9 kg (191 lb 9.3 oz) Height: 5' 6" (167.6 cm)       General: Alert, cooperative, no distress, appears stated age   Head: Normocephalic, without obvious abnormality, atraumatic   Eyes: PERRL, conjunctiva/corneas clear   Lungs: Respirations unlabored   Heart: Warm and well perfused   Abdomen: soft NT ND  :  Normal circumcised phallus, bilaterally descended normal testes and a normal scrotum, no groin tenderness to palpation.  Mild perineal tenderness to palpation.  Digital rectal exam 15-20 g benign prostate smooth, nonnodular, nontender to palpation, with mild pain with palpation towards tailbone.  Extremities: Extremities normal, atraumatic, no cyanosis or edema   Skin: Skin color, texture, turgor normal, no rashes or lesions   Psych: Appropriate   Neurologic: Non-focal       ASSESSMENT   1. Chronic prostatitis/chronic pelvic pain syndrome         Plan    Initial presentation more consistent with epididymitis, however his testis pain is more perineal pain with exacerbation into testis and down buttocks and thighs.  He does not have any overt signs of acute prostatitis or clinical prostatitis though we did discuss chronic prostatitis and chronic pelvic pain syndrome, which he does fit the profile.  We discussed the main stays of anti-inflammatories and I suggested the natural anti-inflammatory Quercitin and recommended starting at 500 mg twice daily as there is evidence base Medicine with chronic pelvic pain and chronic perineal pain and its use.  We did also discuss " that in chronic symptoms refractory to anti-inflammatories and conservative recommendations, pelvic floor physical therapy is often quite successful.  He does have a significant anxiety and stress component and already notes that stretching helps.  I did recommend referral to an evaluation by a pelvic floor physical therapist will make referral.  Patient is open to this.  We did discuss that as there is no current urologic etiology with negative ultrasound and negative exam, that hopefully he will find the relief he needs with pelvic floor physical therapy, and if not would consider discussing with pain management.      20 mins spent in encounter, over half in counseling

## 2019-09-12 ENCOUNTER — PATIENT MESSAGE (OUTPATIENT)
Dept: UROLOGY | Facility: CLINIC | Age: 44
End: 2019-09-12

## 2019-09-20 ENCOUNTER — OFFICE VISIT (OUTPATIENT)
Dept: UROLOGY | Facility: CLINIC | Age: 44
End: 2019-09-20
Payer: COMMERCIAL

## 2019-09-20 VITALS — HEIGHT: 66 IN | BODY MASS INDEX: 31.29 KG/M2 | WEIGHT: 194.69 LBS

## 2019-09-20 DIAGNOSIS — R10.2 PERINEAL PAIN IN MALE: Primary | ICD-10-CM

## 2019-09-20 PROCEDURE — 99999 PR PBB SHADOW E&M-EST. PATIENT-LVL II: CPT | Mod: PBBFAC,,, | Performed by: UROLOGY

## 2019-09-20 PROCEDURE — 99999 PR PBB SHADOW E&M-EST. PATIENT-LVL II: ICD-10-PCS | Mod: PBBFAC,,, | Performed by: UROLOGY

## 2019-09-20 PROCEDURE — 99214 OFFICE O/P EST MOD 30 MIN: CPT | Mod: S$GLB,,, | Performed by: UROLOGY

## 2019-09-20 PROCEDURE — 99214 PR OFFICE/OUTPT VISIT, EST, LEVL IV, 30-39 MIN: ICD-10-PCS | Mod: S$GLB,,, | Performed by: UROLOGY

## 2019-09-20 PROCEDURE — 3008F PR BODY MASS INDEX (BMI) DOCUMENTED: ICD-10-PCS | Mod: CPTII,S$GLB,, | Performed by: UROLOGY

## 2019-09-20 PROCEDURE — 3008F BODY MASS INDEX DOCD: CPT | Mod: CPTII,S$GLB,, | Performed by: UROLOGY

## 2019-09-20 NOTE — PROGRESS NOTES
UROLOGY [place]  9 20 19      Cc low back pain    Age 44, was seen by dr burt recently for acute L epididymitis and was given antibiotics and antiinflammatory agents. Pt did generally well but says he still has residual discomfort on the area. pt has great anxiety. Had scrotal ultrasound, which was normal.     At this time he also has a pressure feeling in the low back in the midline. He also has perineal pain that will radiate down both thighs in the medial aspect towards the knees.     On exam, abd flat nontender, CVAs neg  Penis normal, meatus normal  Scrotum is examined extensively and pt does not appear to have any hypersensitivity on any scrotal structure during examination.   ALESSIA shows normal sphincter tone, normal rectal mucosa, palpation of the perineum, including bimanual examination of the perineum with an intrarectal finger and a free hand outside compressing various parts of the perineum, does not find any masses or nodules, and does not elicit specific tenderness.     There is nonspecific tenderness bilaterally on adductor muscles, symmetric.     IMP  Acute epididymitis, resolving  Vague perineal aching with radiation to both sides along adductor tendons  Anxiety with health issues and fear of disease; anxiety at home with son with cancer and cancer treatments.     Pt reassured, I recommended going ahead with plan to see physical therapist specializing in pelvic floor.  RTC prn      Counseled 35 min  Over 50% of time in counseling

## 2019-10-31 ENCOUNTER — OFFICE VISIT (OUTPATIENT)
Dept: FAMILY MEDICINE | Facility: CLINIC | Age: 44
End: 2019-10-31
Payer: COMMERCIAL

## 2019-10-31 VITALS
DIASTOLIC BLOOD PRESSURE: 86 MMHG | HEIGHT: 66 IN | WEIGHT: 203 LBS | HEART RATE: 96 BPM | BODY MASS INDEX: 32.62 KG/M2 | SYSTOLIC BLOOD PRESSURE: 122 MMHG | OXYGEN SATURATION: 98 %

## 2019-10-31 DIAGNOSIS — M54.50 ACUTE BILATERAL LOW BACK PAIN WITHOUT SCIATICA: Primary | ICD-10-CM

## 2019-10-31 PROCEDURE — 96372 THER/PROPH/DIAG INJ SC/IM: CPT | Mod: S$GLB,,, | Performed by: NURSE PRACTITIONER

## 2019-10-31 PROCEDURE — 3008F PR BODY MASS INDEX (BMI) DOCUMENTED: ICD-10-PCS | Mod: S$GLB,,, | Performed by: NURSE PRACTITIONER

## 2019-10-31 PROCEDURE — 3008F BODY MASS INDEX DOCD: CPT | Mod: S$GLB,,, | Performed by: NURSE PRACTITIONER

## 2019-10-31 PROCEDURE — 96372 PR INJECTION,THERAP/PROPH/DIAG2ST, IM OR SUBCUT: ICD-10-PCS | Mod: S$GLB,,, | Performed by: NURSE PRACTITIONER

## 2019-10-31 PROCEDURE — 99213 PR OFFICE/OUTPT VISIT, EST, LEVL III, 20-29 MIN: ICD-10-PCS | Mod: 25,S$GLB,, | Performed by: NURSE PRACTITIONER

## 2019-10-31 PROCEDURE — 99213 OFFICE O/P EST LOW 20 MIN: CPT | Mod: 25,S$GLB,, | Performed by: NURSE PRACTITIONER

## 2019-10-31 RX ORDER — METHYLPREDNISOLONE ACETATE 80 MG/ML
80 INJECTION, SUSPENSION INTRA-ARTICULAR; INTRALESIONAL; INTRAMUSCULAR; SOFT TISSUE ONCE
Status: COMPLETED | OUTPATIENT
Start: 2019-10-31 | End: 2019-10-31

## 2019-10-31 RX ORDER — MELOXICAM 15 MG/1
15 TABLET ORAL DAILY
Qty: 14 TABLET | Refills: 0 | Status: SHIPPED | OUTPATIENT
Start: 2019-10-31 | End: 2020-01-14

## 2019-10-31 RX ADMIN — METHYLPREDNISOLONE ACETATE 80 MG: 80 INJECTION, SUSPENSION INTRA-ARTICULAR; INTRALESIONAL; INTRAMUSCULAR; SOFT TISSUE at 03:10

## 2019-10-31 NOTE — PATIENT INSTRUCTIONS
Self-Care for Low Back Pain    Most people have low back pain now and then. In many cases, it isnt serious and self-care can help. Sometimes low back pain can be a sign of a bigger problem. Call your healthcare provider if your pain returns often or gets worse over time. For the long-term care of your back, get regular exercise, lose any excess weight and learn good posture.  Take a short rest  Lying down during the day may be beneficial for short periods of time if severe pain increases with sitting or standing. Long-term bed rest could be detrimental.  Reduce pain and swelling  Cold reduces swelling. Both cold and heat can reduce pain. Protect your skin by placing a towel between your body and the ice or heat source.  · For the first few days, apply an ice pack for 15 to 20 minutes .  · After the first few days, try heat for 15 minutes at a time to ease pain. Never sleep on a heating pad.  · Over-the-counter medicine can help control pain and swelling. Try aspirin or ibuprofen.  Exercise  Exercise can help your back heal. It also helps your back get stronger and more flexible, preventing any reinjury. Ask your healthcare provider about specific exercises for your back.  Use good posture to avoid reinjury  · When moving, bend at the hips and knees. Dont bend at the waist or twist around.  · When lifting, keep the object close to your body. Dont try to lift more than you can handle.  · When sitting, keep your lower back supported. Use a rolled-up towel as needed.  Seek immediate medical care if:  · Youre unable to stand or walk.  · You have a temperature over 100.4°F (38.0°C)  · You have frequent, painful, or bloody urination.  · You have severe abdominal pain.  · You have a sharp, stabbing pain.  · Your pain is constant.  · You have pain or numbness in your leg.  · You feel pain in a new area of your back.  · You notice that the pain isnt decreasing after more than a week.   Date Last Reviewed: 9/29/2015  ©  6455-2886 [employer]. [address] [place] [address], [place] [address]. All rights reserved. This information is not intended as a substitute for professional medical care. Always follow your healthcare professional's instructions.

## 2019-10-31 NOTE — PROGRESS NOTES
SUBJECTIVE:      Patient ID: Jem Alvarez is a 44 y.o. male.    Chief Complaint: thigh pain (buttocks pain)    Mr Alvarez is here today complaining of pain in his lower back and buttocks on and off since April. He was treated for low back pain 10+ years ago by a chiropractor. Has had occasional intermittent pain over the years but nothing that he needed to seek treatment for. Over the past few months the pain has been more steady. Notices it more after sitting for long periods of time.     Back Pain   This is a recurrent problem. The current episode started more than 1 month ago. The problem occurs intermittently. The problem has been gradually worsening since onset. The pain is present in the sacro-iliac. The quality of the pain is described as aching. Radiates to: buttocks. The symptoms are aggravated by sitting. Associated symptoms include leg pain and pelvic pain. Pertinent negatives include no abdominal pain, bladder incontinence, bowel incontinence, chest pain, headaches or weakness. He has tried chiropractic manipulation and NSAIDs for the symptoms. The treatment provided mild relief.       Past Surgical History:   Procedure Laterality Date    REPAIR OF TORSION OF TESTICLE Right     age 15    VASECTOMY       Family History   Problem Relation Age of Onset    Hypertension Unknown     No Known Problems Mother     Hypertension Father     Arthritis Father     Nephrolithiasis Father     Stroke Maternal Grandmother     No Known Problems Maternal Grandfather     Stroke Paternal Grandmother     No Known Problems Paternal Grandfather     Hodgkin's lymphoma Son         age 17    Diabetes Neg Hx       Social History     Socioeconomic History    Marital status:      Spouse name: Not on file    Number of children: Not on file    Years of education: Not on file    Highest education level: Not on file   Occupational History    Occupation:     Occupation: fire  department   Social Needs    Financial resource strain: Not on file    Food insecurity:     Worry: Not on file     Inability: Not on file    Transportation needs:     Medical: Not on file     Non-medical: Not on file   Tobacco Use    Smoking status: Current Every Day Smoker     Packs/day: 0.50     Types: Cigarettes     Start date: 1/1/2015    Smokeless tobacco: Never Used   Substance and Sexual Activity    Alcohol use: Yes     Comment: seldom    Drug use: No    Sexual activity: Yes   Lifestyle    Physical activity:     Days per week: Not on file     Minutes per session: Not on file    Stress: Not on file   Relationships    Social connections:     Talks on phone: Not on file     Gets together: Not on file     Attends Latter day service: Not on file     Active member of club or organization: Not on file     Attends meetings of clubs or organizations: Not on file     Relationship status: Not on file   Other Topics Concern    Not on file   Social History Narrative    Not on file     Current Outpatient Medications   Medication Sig Dispense Refill    meloxicam (MOBIC) 15 MG tablet Take 1 tablet (15 mg total) by mouth once daily. 14 tablet 0    tadalafil (CIALIS) 20 MG Tab Take one tablet by mouth as needed for erectile dysfunction. May repeat in 72 hours (Patient not taking: Reported on 10/31/2019) 10 tablet 6     No current facility-administered medications for this visit.      Review of patient's allergies indicates:  No Known Allergies   Past Medical History:   Diagnosis Date    Anxiety      Past Surgical History:   Procedure Laterality Date    REPAIR OF TORSION OF TESTICLE Right     age 15    VASECTOMY         Review of Systems   Constitutional: Negative for appetite change, chills, diaphoresis and unexpected weight change.   HENT: Negative for ear discharge, facial swelling, hearing loss, nosebleeds and trouble swallowing.    Eyes: Negative for photophobia, pain and visual disturbance.   Respiratory:  "Negative for apnea, choking, shortness of breath and wheezing.    Cardiovascular: Negative for chest pain and palpitations.   Gastrointestinal: Negative for abdominal pain, blood in stool, bowel incontinence and vomiting.   Endocrine: Negative for polyphagia.   Genitourinary: Positive for pelvic pain. Negative for bladder incontinence, difficulty urinating and hematuria.   Musculoskeletal: Positive for back pain. Negative for gait problem and joint swelling.   Skin: Negative for pallor.   Neurological: Negative for dizziness, seizures, speech difficulty, weakness, light-headedness and headaches.   Hematological: Does not bruise/bleed easily.   Psychiatric/Behavioral: Negative for agitation and confusion. The patient is not nervous/anxious.       OBJECTIVE:      Vitals:    10/31/19 1443 10/31/19 1525   BP: (!) 120/90 122/86   Pulse: 96    SpO2: 98%    Weight: 92.1 kg (203 lb)    Height: 5' 6" (1.676 m)      Physical Exam   Constitutional: He is oriented to person, place, and time. He appears well-developed and well-nourished.   HENT:   Head: Atraumatic.   Eyes: Conjunctivae are normal.   Neck: Neck supple. No JVD present. No thyromegaly present.   Cardiovascular: Normal rate, regular rhythm, normal heart sounds and intact distal pulses.   Pulmonary/Chest: Effort normal and breath sounds normal.   Abdominal: Soft. Bowel sounds are normal. He exhibits no distension. There is no tenderness.   Musculoskeletal: Normal range of motion.        Lumbar back: He exhibits tenderness.   SLR neg   Neurological: He is alert and oriented to person, place, and time.   Skin: Skin is warm and dry.   Psychiatric: He has a normal mood and affect.   Nursing note and vitals reviewed.     Assessment:       1. Acute bilateral low back pain without sciatica        Plan:       Acute bilateral low back pain without sciatica  -     X-Ray Lumbar Complete With Flex And Ext; Future; Expected date: 10/31/2019  -     Ambulatory referral to Physical " Medicine Rehab  -     meloxicam (MOBIC) 15 MG tablet; Take 1 tablet (15 mg total) by mouth once daily.  Dispense: 14 tablet; Refill: 0  -     methylPREDNISolone acetate injection 80 mg        Follow up if symptoms worsen or fail to improve.      10/31/2019 JOLENE Ibrahim, FNP

## 2019-12-04 ENCOUNTER — OFFICE VISIT (OUTPATIENT)
Dept: FAMILY MEDICINE | Facility: CLINIC | Age: 44
End: 2019-12-04
Payer: COMMERCIAL

## 2019-12-04 VITALS
BODY MASS INDEX: 32.62 KG/M2 | HEART RATE: 90 BPM | DIASTOLIC BLOOD PRESSURE: 88 MMHG | SYSTOLIC BLOOD PRESSURE: 126 MMHG | WEIGHT: 203 LBS | HEIGHT: 66 IN | OXYGEN SATURATION: 96 %

## 2019-12-04 DIAGNOSIS — R03.0 ELEVATED BLOOD-PRESSURE READING, WITHOUT DIAGNOSIS OF HYPERTENSION: ICD-10-CM

## 2019-12-04 DIAGNOSIS — R10.32 ABDOMINAL DISCOMFORT IN LEFT LOWER QUADRANT: Primary | ICD-10-CM

## 2019-12-04 DIAGNOSIS — Z13.220 SCREENING, LIPID: ICD-10-CM

## 2019-12-04 PROCEDURE — 99214 PR OFFICE/OUTPT VISIT, EST, LEVL IV, 30-39 MIN: ICD-10-PCS | Mod: S$GLB,,, | Performed by: NURSE PRACTITIONER

## 2019-12-04 PROCEDURE — 3008F PR BODY MASS INDEX (BMI) DOCUMENTED: ICD-10-PCS | Mod: S$GLB,,, | Performed by: NURSE PRACTITIONER

## 2019-12-04 PROCEDURE — 99214 OFFICE O/P EST MOD 30 MIN: CPT | Mod: S$GLB,,, | Performed by: NURSE PRACTITIONER

## 2019-12-04 PROCEDURE — 3008F BODY MASS INDEX DOCD: CPT | Mod: S$GLB,,, | Performed by: NURSE PRACTITIONER

## 2019-12-04 NOTE — PATIENT INSTRUCTIONS

## 2019-12-04 NOTE — PROGRESS NOTES
SUBJECTIVE:      Patient ID: Jem Alvarez is a 44 y.o. male.    Chief Complaint: Abdominal Pain (lower side radiating to back)    Mr Alvarez is here today to f/u on lower back/lower pelvic pain.  He has seen Dr Devine and Dr Espinoza for similar pain. He was referred to PT for pelvic floor exercises but did not schedule the appt. The pain is unchanged. He did not feel the mobic helped the pain. He was jumping over a fence a week ago and felt the pain in his lower abdomen which radiated into his left testicle. He feels since he was treated for epididymitis back in April he has had residual pain in his lower abdomen and left testicle. Back pain and lower abdominal pain are worse with sitting for long periods of time. The pain is intermittent, no changed in bowel or bladder function, no fever. He says he is not having any pain today    Back Pain   This is a recurrent problem. The current episode started more than 1 month ago. The problem occurs intermittently. The problem has been gradually worsening since onset. The pain is present in the sacro-iliac. The quality of the pain is described as aching. Radiates to: buttocks. The symptoms are aggravated by sitting. Associated symptoms include leg pain and pelvic pain. Pertinent negatives include no abdominal pain, bladder incontinence, bowel incontinence, chest pain, dysuria, fever, headaches or weakness. He has tried chiropractic manipulation and NSAIDs for the symptoms. The treatment provided mild relief.   Abdominal Pain   This is a recurrent problem. The current episode started more than 1 month ago. The problem occurs intermittently. The problem has been unchanged. The pain is located in the LLQ. The pain is mild. The quality of the pain is sharp. Pain radiation: left testicle  Pertinent negatives include no constipation, diarrhea, dysuria, fever, flatus, frequency, headaches, hematuria, melena or vomiting. Exacerbated by: sitting. The pain is relieved by  nothing. He has tried nothing for the symptoms.       Past Surgical History:   Procedure Laterality Date    REPAIR OF TORSION OF TESTICLE Right     age 15    VASECTOMY       Family History   Problem Relation Age of Onset    Hypertension Unknown     No Known Problems Mother     Hypertension Father     Arthritis Father     Nephrolithiasis Father     Stroke Maternal Grandmother     No Known Problems Maternal Grandfather     Stroke Paternal Grandmother     No Known Problems Paternal Grandfather     Hodgkin's lymphoma Son         age 17    Diabetes Neg Hx       Social History     Socioeconomic History    Marital status:      Spouse name: Not on file    Number of children: Not on file    Years of education: Not on file    Highest education level: Not on file   Occupational History    Occupation:     Occupation: fire department   Social Needs    Financial resource strain: Not on file    Food insecurity:     Worry: Not on file     Inability: Not on file    Transportation needs:     Medical: Not on file     Non-medical: Not on file   Tobacco Use    Smoking status: Current Every Day Smoker     Packs/day: 0.50     Types: Cigarettes     Start date: 1/1/2015    Smokeless tobacco: Never Used   Substance and Sexual Activity    Alcohol use: Yes     Comment: seldom    Drug use: No    Sexual activity: Yes   Lifestyle    Physical activity:     Days per week: Not on file     Minutes per session: Not on file    Stress: Not on file   Relationships    Social connections:     Talks on phone: Not on file     Gets together: Not on file     Attends Yarsani service: Not on file     Active member of club or organization: Not on file     Attends meetings of clubs or organizations: Not on file     Relationship status: Not on file   Other Topics Concern    Not on file   Social History Narrative    Not on file     Current Outpatient Medications   Medication Sig Dispense Refill     "meloxicam (MOBIC) 15 MG tablet Take 1 tablet (15 mg total) by mouth once daily. (Patient not taking: Reported on 12/4/2019) 14 tablet 0    tadalafil (CIALIS) 20 MG Tab Take one tablet by mouth as needed for erectile dysfunction. May repeat in 72 hours (Patient not taking: Reported on 10/31/2019) 10 tablet 6     No current facility-administered medications for this visit.      Review of patient's allergies indicates:  No Known Allergies   Past Medical History:   Diagnosis Date    Anxiety      Past Surgical History:   Procedure Laterality Date    REPAIR OF TORSION OF TESTICLE Right     age 15    VASECTOMY         Review of Systems   Constitutional: Negative for appetite change, chills, diaphoresis, fever and unexpected weight change.   HENT: Negative for ear discharge, facial swelling, hearing loss, nosebleeds and trouble swallowing.    Eyes: Negative for photophobia, pain and visual disturbance.   Respiratory: Negative for apnea, choking, shortness of breath and wheezing.    Cardiovascular: Negative for chest pain and palpitations.   Gastrointestinal: Negative for abdominal pain, blood in stool, bowel incontinence, constipation, diarrhea, flatus, melena and vomiting.   Endocrine: Negative for polyphagia.   Genitourinary: Positive for pelvic pain. Negative for bladder incontinence, decreased urine volume, difficulty urinating, discharge, dysuria, frequency, hematuria, penile pain, penile swelling, scrotal swelling, testicular pain and urgency.   Musculoskeletal: Positive for back pain. Negative for gait problem and joint swelling.   Skin: Negative for pallor.   Neurological: Negative for dizziness, seizures, speech difficulty, weakness and headaches.   Hematological: Does not bruise/bleed easily.   Psychiatric/Behavioral: Negative for agitation and confusion.      OBJECTIVE:      Vitals:    12/04/19 1427   BP: (!) 120/90   Pulse: 90   SpO2: 96%   Weight: 92.1 kg (203 lb)   Height: 5' 6" (1.676 m)     Physical Exam "   Constitutional: He is oriented to person, place, and time. He appears well-developed and well-nourished.   HENT:   Head: Atraumatic.   Eyes: Conjunctivae are normal.   Neck: Neck supple.   Cardiovascular: Normal rate, regular rhythm, normal heart sounds and intact distal pulses.   Pulmonary/Chest: Effort normal and breath sounds normal.   Abdominal: Soft. Bowel sounds are normal. He exhibits no distension. There is no hepatosplenomegaly. There is no tenderness. There is no rigidity, no rebound, no guarding and no CVA tenderness.   Musculoskeletal: Normal range of motion.   Lymphadenopathy:     He has no cervical adenopathy.   Neurological: He is alert and oriented to person, place, and time.   Skin: Skin is warm and dry.   Psychiatric: He has a normal mood and affect. His speech is normal.   Nursing note and vitals reviewed.     Assessment:       1. Abdominal discomfort in left lower quadrant    2. Screening, lipid    3. Elevated blood-pressure reading, without diagnosis of hypertension        Plan:       Abdominal discomfort in left lower quadrant  -     US Abdomen Complete; Future; Expected date: 12/04/2019  -     Comprehensive metabolic panel; Future; Expected date: 12/04/2019  -     Urinalysis; Future; Expected date: 12/04/2019  -     CBC auto differential; Future; Expected date: 12/04/2019  Encouraged patient to f/u with Dr Espinoza or Dr Devine for recurrent left testicular pain    Screening, lipid  -     Lipid panel; Future; Expected date: 12/04/2019    Elevated blood-pressure reading, without diagnosis of hypertension  Advised pt on low sodium heart healthy diet and daily exercise. If blood pressure not improved discussed starting medication to control blood pressure.       Follow up in about 3 weeks (around 12/25/2019) for abdominal pain .      12/4/2019 JOLENE Ibrahim, FNP

## 2019-12-10 ENCOUNTER — HOSPITAL ENCOUNTER (OUTPATIENT)
Dept: RADIOLOGY | Facility: HOSPITAL | Age: 44
Discharge: HOME | End: 2019-12-10
Attending: NURSE PRACTITIONER
Payer: COMMERCIAL

## 2019-12-10 ENCOUNTER — LAB VISIT (OUTPATIENT)
Dept: LAB | Facility: HOSPITAL | Age: 44
End: 2019-12-10
Attending: NURSE PRACTITIONER
Payer: COMMERCIAL

## 2019-12-10 DIAGNOSIS — R10.32 ABDOMINAL DISCOMFORT IN LEFT LOWER QUADRANT: ICD-10-CM

## 2019-12-10 DIAGNOSIS — Z13.220 SCREENING, LIPID: ICD-10-CM

## 2019-12-10 DIAGNOSIS — R10.32 ABDOMINAL CRAMPING IN LEFT LOWER QUADRANT: ICD-10-CM

## 2019-12-10 DIAGNOSIS — M54.50 ACUTE BILATERAL LOW BACK PAIN WITHOUT SCIATICA: ICD-10-CM

## 2019-12-10 DIAGNOSIS — R10.32 ABDOMINAL CRAMPING IN LEFT LOWER QUADRANT: Primary | ICD-10-CM

## 2019-12-10 LAB
ALBUMIN SERPL BCP-MCNC: 4.9 G/DL (ref 3.5–5.2)
ALP SERPL-CCNC: 57 U/L (ref 55–135)
ALT SERPL W/O P-5'-P-CCNC: 46 U/L (ref 10–44)
ANION GAP SERPL CALC-SCNC: 8 MMOL/L (ref 8–16)
AST SERPL-CCNC: 22 U/L (ref 10–40)
BASOPHILS # BLD AUTO: 0.08 K/UL (ref 0–0.2)
BASOPHILS NFR BLD: 1 % (ref 0–1.9)
BILIRUB SERPL-MCNC: 1.1 MG/DL (ref 0.1–1)
BILIRUB UR QL STRIP: NEGATIVE
BUN SERPL-MCNC: 21 MG/DL (ref 6–20)
CALCIUM SERPL-MCNC: 9.7 MG/DL (ref 8.7–10.5)
CHLORIDE SERPL-SCNC: 100 MMOL/L (ref 95–110)
CHOLEST SERPL-MCNC: 208 MG/DL (ref 120–199)
CHOLEST/HDLC SERPL: 5.9 {RATIO} (ref 2–5)
CLARITY UR: CLEAR
CO2 SERPL-SCNC: 29 MMOL/L (ref 23–29)
COLOR UR: COLORLESS
CREAT SERPL-MCNC: 1 MG/DL (ref 0.5–1.4)
DIFFERENTIAL METHOD: ABNORMAL
EOSINOPHIL # BLD AUTO: 0.2 K/UL (ref 0–0.5)
EOSINOPHIL NFR BLD: 2 % (ref 0–8)
ERYTHROCYTE [DISTWIDTH] IN BLOOD BY AUTOMATED COUNT: 12 % (ref 11.5–14.5)
EST. GFR  (AFRICAN AMERICAN): >60 ML/MIN/1.73 M^2
EST. GFR  (NON AFRICAN AMERICAN): >60 ML/MIN/1.73 M^2
GLUCOSE SERPL-MCNC: 93 MG/DL (ref 70–110)
GLUCOSE UR QL STRIP: NEGATIVE
HCT VFR BLD AUTO: 50.7 % (ref 40–54)
HDLC SERPL-MCNC: 35 MG/DL (ref 40–75)
HDLC SERPL: 16.8 % (ref 20–50)
HGB BLD-MCNC: 16.4 G/DL (ref 14–18)
HGB UR QL STRIP: NEGATIVE
IMM GRANULOCYTES # BLD AUTO: 0.13 K/UL (ref 0–0.04)
IMM GRANULOCYTES NFR BLD AUTO: 1.5 % (ref 0–0.5)
KETONES UR QL STRIP: NEGATIVE
LDLC SERPL CALC-MCNC: 155.2 MG/DL (ref 63–159)
LEUKOCYTE ESTERASE UR QL STRIP: NEGATIVE
LYMPHOCYTES # BLD AUTO: 2.7 K/UL (ref 1–4.8)
LYMPHOCYTES NFR BLD: 32.2 % (ref 18–48)
MCH RBC QN AUTO: 30.1 PG (ref 27–31)
MCHC RBC AUTO-ENTMCNC: 32.3 G/DL (ref 32–36)
MCV RBC AUTO: 93 FL (ref 82–98)
MONOCYTES # BLD AUTO: 0.9 K/UL (ref 0.3–1)
MONOCYTES NFR BLD: 10.2 % (ref 4–15)
NEUTROPHILS # BLD AUTO: 4.5 K/UL (ref 1.8–7.7)
NEUTROPHILS NFR BLD: 53.1 % (ref 38–73)
NITRITE UR QL STRIP: NEGATIVE
NONHDLC SERPL-MCNC: 173 MG/DL
NRBC BLD-RTO: 0 /100 WBC
PH UR STRIP: 7 [PH] (ref 5–8)
PLATELET # BLD AUTO: 320 K/UL (ref 150–350)
PMV BLD AUTO: 10.4 FL (ref 9.2–12.9)
POTASSIUM SERPL-SCNC: 4.3 MMOL/L (ref 3.5–5.1)
PROT SERPL-MCNC: 8 G/DL (ref 6–8.4)
PROT UR QL STRIP: NEGATIVE
RBC # BLD AUTO: 5.45 M/UL (ref 4.6–6.2)
SODIUM SERPL-SCNC: 137 MMOL/L (ref 136–145)
SP GR UR STRIP: 1 (ref 1–1.03)
TRIGL SERPL-MCNC: 89 MG/DL (ref 30–150)
URN SPEC COLLECT METH UR: ABNORMAL
UROBILINOGEN UR STRIP-ACNC: NEGATIVE EU/DL
WBC # BLD AUTO: 8.42 K/UL (ref 3.9–12.7)

## 2019-12-10 PROCEDURE — 80053 COMPREHEN METABOLIC PANEL: CPT

## 2019-12-10 PROCEDURE — 76857 US EXAM PELVIC LIMITED: CPT | Mod: TC,PO

## 2019-12-10 PROCEDURE — 36415 COLL VENOUS BLD VENIPUNCTURE: CPT

## 2019-12-10 PROCEDURE — 80061 LIPID PANEL: CPT

## 2019-12-10 PROCEDURE — 72110 X-RAY EXAM L-2 SPINE 4/>VWS: CPT | Mod: TC,PO

## 2019-12-10 PROCEDURE — 81003 URINALYSIS AUTO W/O SCOPE: CPT

## 2019-12-10 PROCEDURE — 76700 US EXAM ABDOM COMPLETE: CPT | Mod: TC,PO

## 2019-12-10 PROCEDURE — 85025 COMPLETE CBC W/AUTO DIFF WBC: CPT

## 2020-01-02 ENCOUNTER — OFFICE VISIT (OUTPATIENT)
Dept: FAMILY MEDICINE | Facility: CLINIC | Age: 45
End: 2020-01-02
Payer: COMMERCIAL

## 2020-01-02 VITALS
WEIGHT: 194 LBS | HEIGHT: 66 IN | SYSTOLIC BLOOD PRESSURE: 120 MMHG | OXYGEN SATURATION: 97 % | TEMPERATURE: 98 F | DIASTOLIC BLOOD PRESSURE: 86 MMHG | HEART RATE: 74 BPM | BODY MASS INDEX: 31.18 KG/M2

## 2020-01-02 DIAGNOSIS — Z00.00 PREVENTATIVE HEALTH CARE: Primary | ICD-10-CM

## 2020-01-02 DIAGNOSIS — M54.50 ACUTE BILATERAL LOW BACK PAIN WITHOUT SCIATICA: ICD-10-CM

## 2020-01-02 DIAGNOSIS — E78.5 HYPERLIPIDEMIA, UNSPECIFIED HYPERLIPIDEMIA TYPE: ICD-10-CM

## 2020-01-02 PROCEDURE — 99396 PR PREVENTIVE VISIT,EST,40-64: ICD-10-PCS | Mod: S$GLB,,, | Performed by: NURSE PRACTITIONER

## 2020-01-02 PROCEDURE — 99396 PREV VISIT EST AGE 40-64: CPT | Mod: S$GLB,,, | Performed by: NURSE PRACTITIONER

## 2020-01-02 NOTE — PROGRESS NOTES
SUBJECTIVE:      Patient ID: Jem Alvarez is a 44 y.o. male.    Chief Complaint: Abdominal Pain (continuity of care) and Follow-up (followup labs)    Mr Alvarez is here today for his annual exam and review labs. His lipids are elevated, will discuss treatment options. His blood pressure is improved today, he has been monitoring his blood pressure at home and average readings are 120-130/80's. He continues to have lower back pain, lumbar xray showed degenerative changes. Will discuss referral.       Past Surgical History:   Procedure Laterality Date    REPAIR OF TORSION OF TESTICLE Right     age 15    VASECTOMY       Family History   Problem Relation Age of Onset    Hypertension Unknown     No Known Problems Mother     Hypertension Father     Arthritis Father     Nephrolithiasis Father     Stroke Maternal Grandmother     No Known Problems Maternal Grandfather     Stroke Paternal Grandmother     No Known Problems Paternal Grandfather     Hodgkin's lymphoma Son         age 17    Diabetes Neg Hx       Social History     Socioeconomic History    Marital status:      Spouse name: Not on file    Number of children: Not on file    Years of education: Not on file    Highest education level: Not on file   Occupational History    Occupation:     Occupation: fire department   Social Needs    Financial resource strain: Not on file    Food insecurity:     Worry: Not on file     Inability: Not on file    Transportation needs:     Medical: Not on file     Non-medical: Not on file   Tobacco Use    Smoking status: Current Every Day Smoker     Packs/day: 0.50     Types: Cigarettes     Start date: 1/1/2015    Smokeless tobacco: Never Used   Substance and Sexual Activity    Alcohol use: Yes     Comment: seldom    Drug use: No    Sexual activity: Yes   Lifestyle    Physical activity:     Days per week: Not on file     Minutes per session: Not on file    Stress: Only a  little   Relationships    Social connections:     Talks on phone: Not on file     Gets together: Not on file     Attends Ep[employer] service: Not on file     Active member of club or organization: Not on file     Attends meetings of clubs or organizations: Not on file     Relationship status: Not on file   Other Topics Concern    Not on file   Social History Narrative    Live with partner     Current Outpatient Medications   Medication Sig Dispense Refill    meloxicam (MOBIC) 15 MG tablet Take 1 tablet (15 mg total) by mouth once daily. (Patient not taking: Reported on 12/4/2019) 14 tablet 0    tadalafil (CIALIS) 20 MG Tab Take one tablet by mouth as needed for erectile dysfunction. May repeat in 72 hours (Patient not taking: Reported on 10/31/2019) 10 tablet 6     No current facility-administered medications for this visit.      Review of patient's allergies indicates:  No Known Allergies   Past Medical History:   Diagnosis Date    Anxiety      Past Surgical History:   Procedure Laterality Date    REPAIR OF TORSION OF TESTICLE Right     age 15    VASECTOMY         Review of Systems   Constitutional: Negative for appetite change, chills, diaphoresis and unexpected weight change.   HENT: Negative for ear discharge, facial swelling, hearing loss, nosebleeds and trouble swallowing.    Eyes: Negative for photophobia, pain and visual disturbance.   Respiratory: Negative for apnea, choking, shortness of breath and wheezing.    Cardiovascular: Negative for chest pain and palpitations.   Gastrointestinal: Negative for abdominal pain, blood in stool and vomiting.   Endocrine: Negative for polyphagia.   Genitourinary: Negative for difficulty urinating and hematuria.   Musculoskeletal: Positive for back pain. Negative for gait problem and joint swelling.   Skin: Negative for pallor.   Neurological: Negative for dizziness, seizures, speech difficulty and weakness.   Hematological: Does not bruise/bleed easily.  "  Psychiatric/Behavioral: Negative for agitation and confusion.      OBJECTIVE:      Vitals:    01/02/20 1009   BP: 120/86   BP Location: Right arm   Patient Position: Sitting   Pulse: 74   Temp: 97.5 °F (36.4 °C)   TempSrc: Temporal   SpO2: 97%   Weight: 88 kg (194 lb)   Height: 5' 6" (1.676 m)     Physical Exam   Constitutional: He is oriented to person, place, and time. He appears well-developed and well-nourished.   HENT:   Head: Atraumatic.   Eyes: Conjunctivae are normal.   Neck: Neck supple. No JVD present. Carotid bruit is not present. No thyromegaly present.   Cardiovascular: Normal rate, regular rhythm, normal heart sounds and intact distal pulses.   Pulmonary/Chest: Effort normal and breath sounds normal. He has no wheezes. He has no rhonchi. He has no rales.   Abdominal: Soft. Bowel sounds are normal. He exhibits no distension. There is no tenderness.   Musculoskeletal: Normal range of motion. He exhibits no edema.   Neurological: He is alert and oriented to person, place, and time.   Skin: Skin is warm and dry.   Psychiatric: He has a normal mood and affect. His speech is normal and behavior is normal.   Nursing note and vitals reviewed.      Lab Visit on 12/10/2019   Component Date Value Ref Range Status    Sodium 12/10/2019 137  136 - 145 mmol/L Final    Potassium 12/10/2019 4.3  3.5 - 5.1 mmol/L Final    Chloride 12/10/2019 100  95 - 110 mmol/L Final    CO2 12/10/2019 29  23 - 29 mmol/L Final    Glucose 12/10/2019 93  70 - 110 mg/dL Final    BUN, Bld 12/10/2019 21* 6 - 20 mg/dL Final    Creatinine 12/10/2019 1.0  0.5 - 1.4 mg/dL Final    Calcium 12/10/2019 9.7  8.7 - 10.5 mg/dL Final    Total Protein 12/10/2019 8.0  6.0 - 8.4 g/dL Final    Albumin 12/10/2019 4.9  3.5 - 5.2 g/dL Final    Total Bilirubin 12/10/2019 1.1* 0.1 - 1.0 mg/dL Final    Comment: For infants and newborns, interpretation of results should be based  on gestational age, weight and in agreement with " clinical  observations.  Premature Infant recommended reference ranges:  Up to 24 hours.............<8.0 mg/dL  Up to 48 hours............<12.0 mg/dL  3-5 days..................<15.0 mg/dL  6-29 days.................<15.0 mg/dL      Alkaline Phosphatase 12/10/2019 57  55 - 135 U/L Final    AST 12/10/2019 22  10 - 40 U/L Final    ALT 12/10/2019 46* 10 - 44 U/L Final    Anion Gap 12/10/2019 8  8 - 16 mmol/L Final    eGFR if African American 12/10/2019 >60.0  >60 mL/min/1.73 m^2 Final    eGFR if non African American 12/10/2019 >60.0  >60 mL/min/1.73 m^2 Final    Comment: Calculation used to obtain the estimated glomerular filtration  rate (eGFR) is the CKD-EPI equation.       Specimen UA 12/10/2019 Urine, Clean Catch   Final    Color, UA 12/10/2019 Colorless* Yellow, Straw, Kim Final    Appearance, UA 12/10/2019 Clear  Clear Final    pH, UA 12/10/2019 7.0  5.0 - 8.0 Final    Specific Gravity, UA 12/10/2019 1.005  1.005 - 1.030 Final    Protein, UA 12/10/2019 Negative  Negative Final    Comment: Recommend a 24 hour urine protein or a urine   protein/creatinine ratio if globulin induced proteinuria is  clinically suspected.      Glucose, UA 12/10/2019 Negative  Negative Final    Ketones, UA 12/10/2019 Negative  Negative Final    Bilirubin (UA) 12/10/2019 Negative  Negative Final    Occult Blood UA 12/10/2019 Negative  Negative Final    Nitrite, UA 12/10/2019 Negative  Negative Final    Urobilinogen, UA 12/10/2019 Negative  Negative EU/dL Final    Leukocytes, UA 12/10/2019 Negative  Negative Final    WBC 12/10/2019 8.42  3.90 - 12.70 K/uL Final    RBC 12/10/2019 5.45  4.60 - 6.20 M/uL Final    Hemoglobin 12/10/2019 16.4  14.0 - 18.0 g/dL Final    Hematocrit 12/10/2019 50.7  40.0 - 54.0 % Final    Mean Corpuscular Volume 12/10/2019 93  82 - 98 fL Final    Mean Corpuscular Hemoglobin 12/10/2019 30.1  27.0 - 31.0 pg Final    Mean Corpuscular Hemoglobin Conc 12/10/2019 32.3  32.0 - 36.0 g/dL Final     RDW 12/10/2019 12.0  11.5 - 14.5 % Final    Platelets 12/10/2019 320  150 - 350 K/uL Final    MPV 12/10/2019 10.4  9.2 - 12.9 fL Final    Immature Granulocytes 12/10/2019 1.5* 0.0 - 0.5 % Final    Gran # (ANC) 12/10/2019 4.5  1.8 - 7.7 K/uL Final    Immature Grans (Abs) 12/10/2019 0.13* 0.00 - 0.04 K/uL Final    Comment: Mild elevation in immature granulocytes is non specific and   can be seen in a variety of conditions including stress response,   acute inflammation, trauma and pregnancy. Correlation with other   laboratory and clinical findings is essential.      Lymph # 12/10/2019 2.7  1.0 - 4.8 K/uL Final    Mono # 12/10/2019 0.9  0.3 - 1.0 K/uL Final    Eos # 12/10/2019 0.2  0.0 - 0.5 K/uL Final    Baso # 12/10/2019 0.08  0.00 - 0.20 K/uL Final    nRBC 12/10/2019 0  0 /100 WBC Final    Gran% 12/10/2019 53.1  38.0 - 73.0 % Final    Lymph% 12/10/2019 32.2  18.0 - 48.0 % Final    Mono% 12/10/2019 10.2  4.0 - 15.0 % Final    Eosinophil% 12/10/2019 2.0  0.0 - 8.0 % Final    Basophil% 12/10/2019 1.0  0.0 - 1.9 % Final    Differential Method 12/10/2019 Automated   Final    Cholesterol 12/10/2019 208* 120 - 199 mg/dL Final    Comment: The National Cholesterol Education Program (NCEP) has set the  following guidelines (reference ranges) for Cholesterol:  Optimal.....................<200 mg/dL  Borderline High.............200-239 mg/dL  High........................> or = 240 mg/dL      Triglycerides 12/10/2019 89  30 - 150 mg/dL Final    Comment: The National Cholesterol Education Program (NCEP) has set the  following guidelines (reference values) for triglycerides:  Normal......................<150 mg/dL  Borderline High.............150-199 mg/dL  High........................200-499 mg/dL      HDL 12/10/2019 35* 40 - 75 mg/dL Final    Comment: The National Cholesterol Education Program (NCEP) has set the  following guidelines (reference values) for HDL Cholesterol:  Low...............<40  mg/dL  Optimal...........>60 mg/dL      LDL Cholesterol 12/10/2019 155.2  63.0 - 159.0 mg/dL Final    Comment: The National Cholesterol Education Program (NCEP) has set the  following guidelines (reference values) for LDL Cholesterol:  Optimal.......................<130 mg/dL  Borderline High...............130-159 mg/dL  High..........................160-189 mg/dL  Very High.....................>190 mg/dL      Hdl/Cholesterol Ratio 12/10/2019 16.8* 20.0 - 50.0 % Final    Total Cholesterol/HDL Ratio 12/10/2019 5.9* 2.0 - 5.0 Final    Non-HDL Cholesterol 12/10/2019 173  mg/dL Final    Comment: Risk category and Non-HDL cholesterol goals:  Coronary heart disease (CHD)or equivalent (10-year risk of CHD >20%):  Non-HDL cholesterol goal     <130 mg/dL  Two or more CHD risk factors and 10-year risk of CHD <= 20%:  Non-HDL cholesterol goal     <160 mg/dL  0 to 1 CHD risk factor:  Non-HDL cholesterol goal     <190 mg/dL     ]  Assessment:       1. Preventative health care    2. Hyperlipidemia, unspecified hyperlipidemia type    3. Acute bilateral low back pain without sciatica        Plan:       Preventative health care  Labs reviewed with patient  UTD with routine immunizations/screenings    Hyperlipidemia, unspecified hyperlipidemia type  -     Lipid panel; Future; Expected date: 02/02/2020  LDL is elevated; recommend high fiber, low fat diet, daily metamucil supplement and daily aerobic exercise. Patient declines medication at this time. Will try lifestyle modification and repeat lipids in 2-3 months.       Acute bilateral low back pain without sciatica  -     Ambulatory referral to Physical Medicine Rehab        Follow up in about 6 months (around 7/2/2020) for hyperlipidemia .      1/2/2020 Franki Salcedo, JOLENE, FNP

## 2020-01-02 NOTE — PATIENT INSTRUCTIONS
Low-Cholesterol Diet  Your body needs cholesterol to build new cells and create certain hormones. There are 2 kinds of cholesterol in your blood:     · HDL (good) cholesterol. This prevents fat deposits (plaque) from building up in your arteries. In this way it protects against heart disease and stroke.  · LDL (bad) cholesterol. This stays in your body and sticks to artery walls. Over time it may block blood flow to the heart and brain. This can cause a heart attack or stroke.  The cholesterol in your blood comes from 2 sources: cholesterol in food that you eat and cholesterol that your liver makes. You should limit the amount of cholesterol in your diet. But the cholesterol that your body makes has the greatest disease risk. And your body makes more cholesterol when your diet is high in bad fats (saturated and trans fats). There are 2 kinds of fats you can eat:  · Good fats, or unsaturated fats (mono-unsaturated and poly-unsaturated). They raise the level of good cholesterol and lower the level of bad cholesterol. Good fats are found in vegetable oils such as olive, sunflower, corn, and soybean oils, and in nuts and seeds.  · Bad fats, or saturated fats (including foods high in cholesterol) and trans fats. These raise your risk of disease. They lower the good cholesterol and raise the level of bad cholesterol. Bad fats are found in animal products, including meat, whole-milk dairy products, and butter. Some plants are also high in bad fats (coconut and palm plants). Trans fats are found in hard (stick) margarines. They are also in many fast foods and commercially baked goods. Soft margarine sold in tubs has fewer trans fats and is safer to use.  High blood cholesterol is usually due to a diet high in saturated fat, along with not being physically active. In some cases, genetics plays a role in causing high cholesterol. The tips below will help you create healthy eating habits that will help lower your blood  cholesterol level.  Create a diet high in good fats, low in bad fats (and low in cholesterol)  The following steps will help you create a diet high in good fats and low in bad fats:  · Talk with your doctor before starting a low cholesterol diet or weight loss program.  · Learn to read nutrition labels and select appropriate portion sizes.  · When cooking, use plant-based unsaturated vegetable oils (sunflower, corn, soybean, canola, peanut, and olive oils).  · Avoid saturated fats found in animal products such as meat, dairy (whole-milk, cheese and ice cream), poultry skin, and egg yolks. Plants high in saturated oils include coconut oil, palm oil, and palm kernel oil.  · If you eat meat, choose smaller portions and lean cuts, such as round, domenica, sirloin, or loin. Eat more meatless meals.  · Replace meat with fish at least 2 times a week. Fish is an important source of the unsaturated fat called omega-3 fatty acids. This fat has potential to lower the risk of heart disease.  · Replace whole-milk dairy products with low-fat or nonfat products. Try soy products. Soy helps to reduce total cholesterol.  · Supplement your diet with protective fibers. Eat nuts, seeds, and whole grains rather than white rice and bread. These foods lower both cholesterol and triglyceride levels. (Triglycerides are another fat found in the blood.) Walnuts are one of the best sources of omega-3 fatty acids.  · Eat plenty of fresh fruits and vegetables daily.  · Avoid fast foods and commercial baked goods. Assume they contain saturated fat unless labeled otherwise.  Date Last Reviewed: 8/1/2016  © 1318-4999 [employer]. [address] [place] [address], [place] [address]. All rights reserved. This information is not intended as a substitute for professional medical care. Always follow your healthcare professional's instructions.

## 2020-01-09 ENCOUNTER — INITIAL CONSULT (OUTPATIENT)
Dept: SPINE | Facility: CLINIC | Age: 45
End: 2020-01-09
Payer: COMMERCIAL

## 2020-01-09 VITALS
BODY MASS INDEX: 31.18 KG/M2 | HEIGHT: 66 IN | SYSTOLIC BLOOD PRESSURE: 125 MMHG | HEART RATE: 84 BPM | WEIGHT: 194 LBS | DIASTOLIC BLOOD PRESSURE: 87 MMHG

## 2020-01-09 DIAGNOSIS — G89.29 CHRONIC MIDLINE LOW BACK PAIN, UNSPECIFIED WHETHER SCIATICA PRESENT: Primary | ICD-10-CM

## 2020-01-09 DIAGNOSIS — M54.50 CHRONIC MIDLINE LOW BACK PAIN, UNSPECIFIED WHETHER SCIATICA PRESENT: Primary | ICD-10-CM

## 2020-01-09 PROCEDURE — 3008F PR BODY MASS INDEX (BMI) DOCUMENTED: ICD-10-PCS | Mod: S$GLB,,, | Performed by: PHYSICAL MEDICINE & REHABILITATION

## 2020-01-09 PROCEDURE — 3008F BODY MASS INDEX DOCD: CPT | Mod: S$GLB,,, | Performed by: PHYSICAL MEDICINE & REHABILITATION

## 2020-01-09 PROCEDURE — 99204 PR OFFICE/OUTPT VISIT, NEW, LEVL IV, 45-59 MIN: ICD-10-PCS | Mod: S$GLB,,, | Performed by: PHYSICAL MEDICINE & REHABILITATION

## 2020-01-09 PROCEDURE — 99204 OFFICE O/P NEW MOD 45 MIN: CPT | Mod: S$GLB,,, | Performed by: PHYSICAL MEDICINE & REHABILITATION

## 2020-01-09 NOTE — LETTER
January 9, 2020      Franki Salcedo NP  [address]  [place] [address]           [facility]  [address] [place] [address] [address]   [place] [address]  Phone: [phone]  Fax: [phone]          Patient: Jem Alvarez   MR Number: [id number]   YOB: 1975   Date of Visit: 1/9/2020       Dear Franki Salcedo:    Thank you for referring Jem Alvarez to me for evaluation. Attached you will find relevant portions of my assessment and plan of care.    If you have questions, please do not hesitate to call me. I look forward to following Jem Alvarez along with you.    Sincerely,    Alex Bhatia MD    Enclosure  CC:  No Recipients    If you would like to receive this communication electronically, please contact [email or web address] or [phone] to request more information on [employer] Link access.    For providers and/or their staff who would like to refer a patient to [facility], please contact us through our one-stop-shop provider referral line, [facility], at [phone].    If you feel you have received this communication in error or would no longer like to receive these types of communications, please e-mail [email or web address]

## 2020-01-09 NOTE — PROGRESS NOTES
SUBJECTIVE:    Patient ID: Jem Alvarez is a 44 y.o. male.    Chief Complaint: Back Pain    This is a 44-year-old man who sees Franki Salcedo nurse practitioner for his primary care.  Denies any chronic major medical problems.  Has history of right orchiectomy.  History of epididymitis of the left testicle.  Long history of intermittent low back pain.  Current complaint is of centralized low back pain at the lumbosacral junction with radiating discomfort down the posterior portion of both legs to about the level of the knees associated with numbness and tingling in the same area and also discomfort with numbness and tingling into the left testicle.  Urological examination including ultrasound and extensive physical examination failed to reveal a cause of the discomfort.  He presents to me with the above complaints.  Symptoms are better if he lies on a hard surface and also improved with placing a fist in the small of his back.  X-rays of the lumbar spine on 12/10/2019 show mild degenerative changes.  Current pain level is 3/10        Past Medical History:   Diagnosis Date    Anxiety      Social History     Socioeconomic History    Marital status:      Spouse name: Not on file    Number of children: Not on file    Years of education: Not on file    Highest education level: Not on file   Occupational History    Occupation:     Occupation: fire department   Social Needs    Financial resource strain: Not on file    Food insecurity:     Worry: Not on file     Inability: Not on file    Transportation needs:     Medical: Not on file     Non-medical: Not on file   Tobacco Use    Smoking status: Current Every Day Smoker     Packs/day: 0.50     Types: Cigarettes     Start date: 1/1/2015    Smokeless tobacco: Never Used   Substance and Sexual Activity    Alcohol use: Yes     Comment: seldom    Drug use: No    Sexual activity: Yes   Lifestyle    Physical activity:     Days per  "week: Not on file     Minutes per session: Not on file    Stress: Only a little   Relationships    Social connections:     Talks on phone: Not on file     Gets together: Not on file     Attends Confucianist service: Not on file     Active member of club or organization: Not on file     Attends meetings of clubs or organizations: Not on file     Relationship status: Not on file   Other Topics Concern    Not on file   Social History Narrative    Live with partner     Past Surgical History:   Procedure Laterality Date    REPAIR OF TORSION OF TESTICLE Right     age 15    VASECTOMY       Family History   Problem Relation Age of Onset    Hypertension Unknown     No Known Problems Mother     Hypertension Father     Arthritis Father     Nephrolithiasis Father     Stroke Maternal Grandmother     No Known Problems Maternal Grandfather     Stroke Paternal Grandmother     No Known Problems Paternal Grandfather     Hodgkin's lymphoma Son         age 17    Diabetes Neg Hx      Vitals:    01/09/20 1517   BP: 125/87   Pulse: 84   Weight: 88 kg (194 lb 0.1 oz)   Height: 5' 6" (1.676 m)       Review of Systems   Constitutional: Negative for chills, diaphoresis, fatigue, fever and unexpected weight change.   HENT: Negative for trouble swallowing.    Eyes: Negative for visual disturbance.   Respiratory: Negative for shortness of breath.    Cardiovascular: Negative for chest pain.   Gastrointestinal: Negative for abdominal pain, constipation, diarrhea, nausea and vomiting.   Genitourinary: Negative for difficulty urinating.   Musculoskeletal: Negative for arthralgias, back pain, gait problem, joint swelling, myalgias, neck pain and neck stiffness.   Neurological: Negative for dizziness, speech difficulty, weakness, light-headedness, numbness and headaches.          Objective:      Physical Exam   Constitutional: He is oriented to person, place, and time. He appears well-developed and well-nourished.   Neurological: He is " alert and oriented to person, place, and time.   He is awake and in no acute distress  No point tenderness external lesions or palpable masses about the lumbar spine  Sacral sensation is intact to light touch in the upper gluteal fold  Forward flexion and extension are normal and painless  He can heel and toe walk normally  Deep tendon reflexes are +2 at both knees and +1 at both ankles  Strength is normal in both lower extremities           Assessment:       1. Chronic midline low back pain, unspecified whether sciatica present           Plan:     he has a nonfocal examination from a neurological standpoint and no historical red flags.  We will get an MRI of the lumbar spine to see if we can correlate his low back and posterior leg pain as well as left testicular pain with any spinal pathology.  He can follow up with me after the scan      Chronic midline low back pain, unspecified whether sciatica present  -     MRI Lumbar Spine Without Contrast; Future; Expected date: 01/09/2020

## 2020-01-14 ENCOUNTER — HOSPITAL ENCOUNTER (EMERGENCY)
Facility: HOSPITAL | Age: 45
Discharge: HOME | End: 2020-01-14
Attending: EMERGENCY MEDICINE
Payer: OTHER MISCELLANEOUS

## 2020-01-14 VITALS
DIASTOLIC BLOOD PRESSURE: 79 MMHG | HEART RATE: 69 BPM | SYSTOLIC BLOOD PRESSURE: 130 MMHG | WEIGHT: 190 LBS | BODY MASS INDEX: 30.67 KG/M2 | RESPIRATION RATE: 20 BRPM | OXYGEN SATURATION: 94 % | TEMPERATURE: 99 F

## 2020-01-14 DIAGNOSIS — R06.02 SOB (SHORTNESS OF BREATH): ICD-10-CM

## 2020-01-14 DIAGNOSIS — R07.9 CHEST PAIN: ICD-10-CM

## 2020-01-14 DIAGNOSIS — R07.9 CHEST PAIN, UNSPECIFIED TYPE: Primary | ICD-10-CM

## 2020-01-14 LAB
ALBUMIN SERPL BCP-MCNC: 4.3 G/DL (ref 3.5–5.2)
ALP SERPL-CCNC: 71 U/L (ref 55–135)
ALT SERPL W/O P-5'-P-CCNC: 45 U/L (ref 10–44)
ANION GAP SERPL CALC-SCNC: 10 MMOL/L (ref 8–16)
AST SERPL-CCNC: 22 U/L (ref 10–40)
BASOPHILS # BLD AUTO: 0.09 K/UL (ref 0–0.2)
BASOPHILS NFR BLD: 0.8 % (ref 0–1.9)
BILIRUB SERPL-MCNC: 0.6 MG/DL (ref 0.1–1)
BUN SERPL-MCNC: 15 MG/DL (ref 6–20)
CALCIUM SERPL-MCNC: 9.7 MG/DL (ref 8.7–10.5)
CHLORIDE SERPL-SCNC: 104 MMOL/L (ref 95–110)
CK MB SERPL-MCNC: 2.3 NG/ML (ref 0.1–6.5)
CK SERPL-CCNC: 219 U/L (ref 20–200)
CO2 SERPL-SCNC: 25 MMOL/L (ref 23–29)
CREAT SERPL-MCNC: 1 MG/DL (ref 0.5–1.4)
DIFFERENTIAL METHOD: ABNORMAL
EOSINOPHIL # BLD AUTO: 0.1 K/UL (ref 0–0.5)
EOSINOPHIL NFR BLD: 0.8 % (ref 0–8)
ERYTHROCYTE [DISTWIDTH] IN BLOOD BY AUTOMATED COUNT: 12.1 % (ref 11.5–14.5)
EST. GFR  (AFRICAN AMERICAN): >60 ML/MIN/1.73 M^2
EST. GFR  (NON AFRICAN AMERICAN): >60 ML/MIN/1.73 M^2
GLUCOSE SERPL-MCNC: 90 MG/DL (ref 70–110)
HCT VFR BLD AUTO: 47.2 % (ref 40–54)
HGB BLD-MCNC: 15.3 G/DL (ref 14–18)
IMM GRANULOCYTES # BLD AUTO: 0.08 K/UL (ref 0–0.04)
IMM GRANULOCYTES NFR BLD AUTO: 0.8 % (ref 0–0.5)
LYMPHOCYTES # BLD AUTO: 1.9 K/UL (ref 1–4.8)
LYMPHOCYTES NFR BLD: 18 % (ref 18–48)
MAGNESIUM SERPL-MCNC: 1.9 MG/DL (ref 1.6–2.6)
MCH RBC QN AUTO: 29.8 PG (ref 27–31)
MCHC RBC AUTO-ENTMCNC: 32.4 G/DL (ref 32–36)
MCV RBC AUTO: 92 FL (ref 82–98)
MONOCYTES # BLD AUTO: 1 K/UL (ref 0.3–1)
MONOCYTES NFR BLD: 9.2 % (ref 4–15)
NEUTROPHILS # BLD AUTO: 7.5 K/UL (ref 1.8–7.7)
NEUTROPHILS NFR BLD: 70.4 % (ref 38–73)
NRBC BLD-RTO: 0 /100 WBC
PLATELET # BLD AUTO: 323 K/UL (ref 150–350)
PMV BLD AUTO: 10.6 FL (ref 9.2–12.9)
POTASSIUM SERPL-SCNC: 3.8 MMOL/L (ref 3.5–5.1)
PROT SERPL-MCNC: 7 G/DL (ref 6–8.4)
RBC # BLD AUTO: 5.14 M/UL (ref 4.6–6.2)
SODIUM SERPL-SCNC: 139 MMOL/L (ref 136–145)
TROPONIN I SERPL DL<=0.01 NG/ML-MCNC: <0.03 NG/ML
VALPROATE SERPL-MCNC: <10 UG/ML (ref 50–100)
WBC # BLD AUTO: 10.64 K/UL (ref 3.9–12.7)

## 2020-01-14 PROCEDURE — 84484 ASSAY OF TROPONIN QUANT: CPT | Mod: 91

## 2020-01-14 PROCEDURE — 85025 COMPLETE CBC W/AUTO DIFF WBC: CPT

## 2020-01-14 PROCEDURE — 99284 EMERGENCY DEPT VISIT MOD MDM: CPT | Mod: 25

## 2020-01-14 PROCEDURE — 93005 ELECTROCARDIOGRAM TRACING: CPT

## 2020-01-14 PROCEDURE — 82553 CREATINE MB FRACTION: CPT

## 2020-01-14 PROCEDURE — 83735 ASSAY OF MAGNESIUM: CPT

## 2020-01-14 PROCEDURE — 80053 COMPREHEN METABOLIC PANEL: CPT

## 2020-01-14 PROCEDURE — 84484 ASSAY OF TROPONIN QUANT: CPT

## 2020-01-14 PROCEDURE — 80164 ASSAY DIPROPYLACETIC ACD TOT: CPT

## 2020-01-14 PROCEDURE — 63600175 PHARM REV CODE 636 W HCPCS: Performed by: STUDENT IN AN ORGANIZED HEALTH CARE EDUCATION/TRAINING PROGRAM

## 2020-01-14 PROCEDURE — 36415 COLL VENOUS BLD VENIPUNCTURE: CPT

## 2020-01-14 PROCEDURE — 82550 ASSAY OF CK (CPK): CPT

## 2020-01-14 PROCEDURE — 25000003 PHARM REV CODE 250: Performed by: STUDENT IN AN ORGANIZED HEALTH CARE EDUCATION/TRAINING PROGRAM

## 2020-01-14 RX ORDER — ASPIRIN 325 MG
325 TABLET ORAL
Status: COMPLETED | OUTPATIENT
Start: 2020-01-14 | End: 2020-01-14

## 2020-01-14 RX ADMIN — ASPIRIN 325 MG ORAL TABLET 325 MG: 325 PILL ORAL at 11:01

## 2020-01-14 RX ADMIN — SODIUM CHLORIDE 500 ML: 0.9 INJECTION, SOLUTION INTRAVENOUS at 11:01

## 2020-01-14 NOTE — ED TRIAGE NOTES
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 Patient was seen by [facility] but he declined treatment. Pt was brought here by his Captain

## 2020-01-14 NOTE — ED NOTES
Patient informed of need to collect second cardiac enzyme. Pt agrees but does not want to stay hooked up to monitor.    IV intact

## 2020-01-14 NOTE — ED PROVIDER NOTES
Encounter Date: 1/14/2020       History     Chief Complaint   Patient presents with    Chest Pain     43 y/o M h/o HTN not on medication presents with SOB. 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 Captain states that HR was 150 at that time and improved only to 130 with rest after a few minutes. Patient denies CP, arm/jaw pain, cough. He does not have any known cardiac history and had a negative stress test last year. He does not have known heart disease in his family. Smokes. Admits does not drink a lot of fluid        Review of patient's allergies indicates:  No Known Allergies  Past Medical History:   Diagnosis Date    Anxiety     Hypertension     not on meds     Past Surgical History:   Procedure Laterality Date    REPAIR OF TORSION OF TESTICLE Right     age 15    VASECTOMY       Family History   Problem Relation Age of Onset    Hypertension Unknown     No Known Problems Mother     Hypertension Father     Arthritis Father     Nephrolithiasis Father     Stroke Maternal Grandmother     No Known Problems Maternal Grandfather     Stroke Paternal Grandmother     No Known Problems Paternal Grandfather     Hodgkin's lymphoma Son         age 17    Diabetes Neg Hx      Social History     Tobacco Use    Smoking status: Current Every Day Smoker     Packs/day: 0.50     Types: Cigarettes     Start date: 1/1/2015    Smokeless tobacco: Never Used   Substance Use Topics    Alcohol use: Yes     Comment: seldom    Drug use: No     Review of Systems   Constitutional: Negative for fever.   HENT: Negative for sore throat, trouble swallowing and voice change.    Respiratory: Positive for shortness of breath. Negative for cough, choking, chest tightness, wheezing and stridor.    Cardiovascular: Negative for chest pain, palpitations and leg swelling.   Gastrointestinal: Negative for  nausea and vomiting.   Genitourinary: Negative for dysuria.   Musculoskeletal: Negative for back pain.   Skin: Negative for rash.   Neurological: Negative for weakness.   Hematological: Does not bruise/bleed easily.       Physical Exam     Initial Vitals [01/14/20 1100]   BP Pulse Resp Temp SpO2   132/79 102 18 98.4 °F (36.9 °C) 98 %      MAP       --         Physical Exam    Nursing note and vitals reviewed.  Constitutional: He appears well-developed and well-nourished. He is not diaphoretic. No distress.   HENT:   Head: Normocephalic.   Right Ear: External ear normal.   Left Ear: External ear normal.   Nose: Nose normal.   Mouth/Throat: Oropharynx is clear and moist.   Eyes: Conjunctivae and EOM are normal. Pupils are equal, round, and reactive to light. Right eye exhibits no discharge. Left eye exhibits no discharge. No scleral icterus.   Neck: Normal range of motion. No tracheal deviation present. No JVD present.   Cardiovascular: Normal rate, regular rhythm, normal heart sounds and intact distal pulses. Exam reveals no gallop and no friction rub.    No murmur heard.  Pulmonary/Chest: Breath sounds normal. No stridor. No respiratory distress. He has no wheezes. He has no rhonchi. He has no rales.   Abdominal: Soft. He exhibits no distension. There is no tenderness.   Musculoskeletal: Normal range of motion. He exhibits no edema or tenderness.   Neurological: He is alert and oriented to person, place, and time.   Skin: Skin is warm and dry. Capillary refill takes less than 2 seconds.         ED Course   Procedures  Labs Reviewed - No data to display       Imaging Results    None          Medical Decision Making:   Initial Assessment:   43 y/o M h/o HTN presents with sob and tachycardia after fire-fighting drill with strenuous activity. Upon arrival tachycardic to 110, otherwise VSS and physical exam WNL.  Initial EKG showed sinus tachycardia without ST elevations or hyperacute T waves.  Initial troponin negative.  Aspirin 325mg given upon arrival.  Pending repeat troponin and IVF, patient declined XR.    Tutu Moore MD PGY - IV  [facility]  12:42 PM 1/14/2020    ED Management:  HR 80 in room now, patient remains assymptomatic.  Repeat troponin undetectable.  Results discussed extensively with patient, stressed importance of f/u with primary care doctor and repeat stress test.  Return precautions given and patient showed understanding.     Tutu Moore MD PGY - IV  [facility]  3:28 PM 1/14/2020                                   Clinical Impression:       ICD-10-CM ICD-9-CM   1. Chest pain, unspecified type R07.9 786.50   2. Chest pain R07.9 786.50   3. SOB (shortness of breath) R06.02 786.05                             Tutu Moore MD  Resident  01/14/20 1527

## 2020-01-14 NOTE — DISCHARGE INSTRUCTIONS
Follow-up with primary care doctor for evaluation of repeat stress test    Return to emergency department if symptoms worsen

## 2020-01-15 ENCOUNTER — PATIENT MESSAGE (OUTPATIENT)
Dept: FAMILY MEDICINE | Facility: CLINIC | Age: 45
End: 2020-01-15

## 2020-01-15 ENCOUNTER — PATIENT MESSAGE (OUTPATIENT)
Dept: UROLOGY | Facility: CLINIC | Age: 45
End: 2020-01-15

## 2020-01-15 ENCOUNTER — TELEPHONE (OUTPATIENT)
Dept: FAMILY MEDICINE | Facility: CLINIC | Age: 45
End: 2020-01-15

## 2020-01-15 DIAGNOSIS — M54.50 ACUTE BILATERAL LOW BACK PAIN WITHOUT SCIATICA: Primary | ICD-10-CM

## 2020-01-15 NOTE — TELEPHONE ENCOUNTER
Spoke with billing a while ago about a bill for his copay for DOS 8/16/19.    I cannot find any appt, or any notes, labs, imaging for this date.  Patient will fax bill to me.  I will check this and see if I can point him in the right direction.

## 2020-01-16 ENCOUNTER — PATIENT MESSAGE (OUTPATIENT)
Dept: UROLOGY | Facility: CLINIC | Age: 45
End: 2020-01-16

## 2020-01-16 NOTE — TELEPHONE ENCOUNTER
Patient reviewed bill again and states the DOS is 8/26/19.  He was seen by Dr. Devine that day.  He says he paid cash for his copay this day, but cannot remember if he received a receipt.  His bill is for the copay.  Advised that I will check on this.

## 2020-01-19 ENCOUNTER — PATIENT MESSAGE (OUTPATIENT)
Dept: UROLOGY | Facility: CLINIC | Age: 45
End: 2020-01-19

## 2020-01-20 NOTE — TELEPHONE ENCOUNTER
Pt called to inquire to see if you performed this procedure Spermatic Nerve Cord blocker     Please advisor

## 2020-01-21 NOTE — TELEPHONE ENCOUNTER
Spoke w pt informed Dr Devine does not perform spermatic cord blocks pt voiced ok and understanding.

## 2020-02-20 ENCOUNTER — PATIENT MESSAGE (OUTPATIENT)
Dept: UROLOGY | Facility: CLINIC | Age: 45
End: 2020-02-20

## 2020-02-20 ENCOUNTER — PATIENT MESSAGE (OUTPATIENT)
Dept: FAMILY MEDICINE | Facility: CLINIC | Age: 45
End: 2020-02-20

## 2020-02-20 NOTE — TELEPHONE ENCOUNTER
Spoke with pt, he was asking for prescription hemorrhoid cream. Pt advised that he would need an appt for the problem in order to receive a prescription. Pt declines appt.

## 2020-02-21 NOTE — TELEPHONE ENCOUNTER
"Spoke with pt and advised him he needed to Patient Advocate Seda Cooper.  I gave pt her phone number as well.  Pt requested I review his chart with him.  I complied. Reviewed DOS. Pt originally questioned DOS in August 2019 with TORREY Frey NP and asked if he had a urine performed at this visit. Informed him yes. Pt then asked about the visit in September 2019 with Dr. Devine and if a urine was performed. No urine was performed. Pt stated then "That visit was fraudlent because I give urine every time and I know myself". Explained to pt urine or no urine sample does not mean a visit was not performed. I again redirected pt to Mrs. Cooper. Pt continued to get complain about fraud and I redirected him to call patient advocate where he could be helped; we could no longer help him. Pt verbalized understanding of receiving Seda Cooper's name and number.  "

## 2020-04-15 ENCOUNTER — OCCUPATIONAL HEALTH (OUTPATIENT)
Dept: URGENT CARE | Facility: CLINIC | Age: 45
End: 2020-04-15

## 2020-04-15 DIAGNOSIS — Z02.83 ENCOUNTER FOR DRUG SCREENING: Primary | ICD-10-CM

## 2020-04-15 LAB
BREATH ALCOHOL: 0
CTP QC/QA: YES
POC 10 PANEL DRUG SCREEN: NEGATIVE

## 2020-04-15 PROCEDURE — 80305 POCT RAPID DRUG SCREEN 10 PANEL: ICD-10-PCS | Mod: QW,S$GLB,, | Performed by: FAMILY MEDICINE

## 2020-04-15 PROCEDURE — 80305 DRUG TEST PRSMV DIR OPT OBS: CPT | Mod: QW,S$GLB,, | Performed by: FAMILY MEDICINE

## 2020-04-15 PROCEDURE — 82075 POCT BREATH ALCOHOL TEST: ICD-10-PCS | Mod: S$GLB,,, | Performed by: FAMILY MEDICINE

## 2020-04-15 PROCEDURE — 82075 ASSAY OF BREATH ETHANOL: CPT | Mod: S$GLB,,, | Performed by: FAMILY MEDICINE

## 2020-05-20 ENCOUNTER — PATIENT MESSAGE (OUTPATIENT)
Dept: FAMILY MEDICINE | Facility: CLINIC | Age: 45
End: 2020-05-20

## 2020-06-10 ENCOUNTER — OFFICE VISIT (OUTPATIENT)
Dept: FAMILY MEDICINE | Facility: CLINIC | Age: 45
End: 2020-06-10
Payer: COMMERCIAL

## 2020-06-10 VITALS
WEIGHT: 190 LBS | HEART RATE: 82 BPM | OXYGEN SATURATION: 97 % | HEIGHT: 66 IN | BODY MASS INDEX: 30.53 KG/M2 | DIASTOLIC BLOOD PRESSURE: 80 MMHG | TEMPERATURE: 99 F | SYSTOLIC BLOOD PRESSURE: 110 MMHG

## 2020-06-10 DIAGNOSIS — K21.9 GASTROESOPHAGEAL REFLUX DISEASE, ESOPHAGITIS PRESENCE NOT SPECIFIED: ICD-10-CM

## 2020-06-10 DIAGNOSIS — E78.5 HYPERLIPIDEMIA, UNSPECIFIED HYPERLIPIDEMIA TYPE: ICD-10-CM

## 2020-06-10 DIAGNOSIS — J32.9 RHINOSINUSITIS: Primary | ICD-10-CM

## 2020-06-10 PROCEDURE — 3008F PR BODY MASS INDEX (BMI) DOCUMENTED: ICD-10-PCS | Mod: S$GLB,,, | Performed by: NURSE PRACTITIONER

## 2020-06-10 PROCEDURE — 99214 PR OFFICE/OUTPT VISIT, EST, LEVL IV, 30-39 MIN: ICD-10-PCS | Mod: S$GLB,,, | Performed by: NURSE PRACTITIONER

## 2020-06-10 PROCEDURE — 3008F BODY MASS INDEX DOCD: CPT | Mod: S$GLB,,, | Performed by: NURSE PRACTITIONER

## 2020-06-10 PROCEDURE — 99214 OFFICE O/P EST MOD 30 MIN: CPT | Mod: S$GLB,,, | Performed by: NURSE PRACTITIONER

## 2020-06-10 RX ORDER — OMEPRAZOLE 20 MG/1
20 CAPSULE, DELAYED RELEASE ORAL DAILY
Qty: 30 CAPSULE | Refills: 0 | Status: SHIPPED | OUTPATIENT
Start: 2020-06-10 | End: 2020-07-02 | Stop reason: SDUPTHER

## 2020-06-10 RX ORDER — CEFDINIR 300 MG/1
300 CAPSULE ORAL 2 TIMES DAILY
Qty: 20 CAPSULE | Refills: 0 | Status: SHIPPED | OUTPATIENT
Start: 2020-06-10 | End: 2020-06-20

## 2020-06-10 RX ORDER — TRIAMCINOLONE ACETONIDE 55 UG/1
2 SPRAY, METERED NASAL DAILY
Qty: 16.9 ML | Refills: 0 | Status: SHIPPED | OUTPATIENT
Start: 2020-06-10 | End: 2021-02-08

## 2020-06-10 NOTE — PROGRESS NOTES
SUBJECTIVE:      Patient ID: Jem Alvarez is a 45 y.o. male.    Chief Complaint: Sinus Problem (sinus drainage)    Mr Alvarez is here today complaining of sinus congestion for over a month. He is having throat clearing and pnd, ears feel full. He has also had intermittent reflux symptoms over the past month. Taking otc tums without relief. He is due for f/u lipids, previous ldl was elevated    Sinus Problem   This is a new problem. The current episode started 1 to 4 weeks ago. The problem has been gradually worsening since onset. There has been no fever. Associated symptoms include congestion, coughing, sinus pressure and a sore throat. Pertinent negatives include no chills, diaphoresis, headaches, hoarse voice or shortness of breath. Past treatments include oral decongestants and saline sprays. The treatment provided mild relief.       Past Surgical History:   Procedure Laterality Date    REPAIR OF TORSION OF TESTICLE Right     age 15    VASECTOMY       Family History   Problem Relation Age of Onset    Hypertension Unknown     No Known Problems Mother     Hypertension Father     Arthritis Father     Nephrolithiasis Father     Stroke Maternal Grandmother     No Known Problems Maternal Grandfather     Stroke Paternal Grandmother     No Known Problems Paternal Grandfather     Hodgkin's lymphoma Son         age 17    Diabetes Neg Hx       Social History     Socioeconomic History    Marital status:      Spouse name: Not on file    Number of children: Not on file    Years of education: Not on file    Highest education level: Not on file   Occupational History    Occupation:     Occupation: fire department   Social Needs    Financial resource strain: Not on file    Food insecurity:     Worry: Not on file     Inability: Not on file    Transportation needs:     Medical: Not on file     Non-medical: Not on file   Tobacco Use    Smoking status: Current Some Day  Smoker     Packs/day: 0.50     Types: Cigarettes     Start date: 1/1/2015    Smokeless tobacco: Never Used   Substance and Sexual Activity    Alcohol use: Yes     Comment: seldom    Drug use: No    Sexual activity: Yes   Lifestyle    Physical activity:     Days per week: Not on file     Minutes per session: Not on file    Stress: Only a little   Relationships    Social connections:     Talks on phone: Not on file     Gets together: Not on file     Attends Shinto service: Not on file     Active member of club or organization: Not on file     Attends meetings of clubs or organizations: Not on file     Relationship status: Not on file   Other Topics Concern    Not on file   Social History Narrative    Live with partner     Current Outpatient Medications   Medication Sig Dispense Refill    cefdinir (OMNICEF) 300 MG capsule Take 1 capsule (300 mg total) by mouth 2 (two) times daily. for 10 days 20 capsule 0    omeprazole (PRILOSEC) 20 MG capsule Take 1 capsule (20 mg total) by mouth once daily. 30 capsule 0    triamcinolone (NASACORT) 55 mcg nasal inhaler 2 sprays by Nasal route once daily. 16.9 mL 0     No current facility-administered medications for this visit.      Review of patient's allergies indicates:  No Known Allergies   Past Medical History:   Diagnosis Date    Anxiety     Hypertension     not on meds     Past Surgical History:   Procedure Laterality Date    REPAIR OF TORSION OF TESTICLE Right     age 15    VASECTOMY         Review of Systems   Constitutional: Negative for appetite change, chills, diaphoresis and unexpected weight change.   HENT: Positive for congestion, sinus pressure and sore throat. Negative for ear discharge, facial swelling, hearing loss, hoarse voice, nosebleeds and trouble swallowing.    Eyes: Negative for photophobia, pain and visual disturbance.   Respiratory: Positive for cough. Negative for apnea, choking and shortness of breath.    Cardiovascular: Negative for  "chest pain and palpitations.   Gastrointestinal: Negative for abdominal pain, blood in stool and vomiting.   Endocrine: Negative for polyphagia.   Genitourinary: Negative for difficulty urinating and hematuria.   Musculoskeletal: Negative for gait problem and joint swelling.   Skin: Negative for pallor.   Neurological: Negative for dizziness, seizures, speech difficulty, weakness, light-headedness and headaches.   Hematological: Does not bruise/bleed easily.   Psychiatric/Behavioral: Negative for agitation and confusion.      OBJECTIVE:      Vitals:    06/10/20 0936   BP: 110/80   Pulse: 82   Temp: 99.1 °F (37.3 °C)   TempSrc: Oral   SpO2: 97%   Weight: 86.2 kg (190 lb)   Height: 5' 6" (1.676 m)     Physical Exam   Constitutional: He is oriented to person, place, and time. He appears well-developed and well-nourished.   HENT:   Head: Atraumatic.   Right Ear: Tympanic membrane is injected.   Left Ear: Tympanic membrane is injected.   Nose: Rhinorrhea present. Mucosal edema: turbinates erythematous and swollen.   Mouth/Throat: Uvula is midline. Posterior oropharyngeal erythema present. No tonsillar exudate.   Eyes: Conjunctivae are normal.   Neck: Neck supple. No JVD present. No thyromegaly present.   Cardiovascular: Normal rate, regular rhythm, normal heart sounds and intact distal pulses.   Pulmonary/Chest: Effort normal and breath sounds normal. He has no wheezes. He has no rhonchi. He has no rales.   Abdominal: Soft. Bowel sounds are normal. He exhibits no distension. There is no tenderness.   Musculoskeletal: Normal range of motion. He exhibits no edema.   Lymphadenopathy:     He has no cervical adenopathy.   Neurological: He is alert and oriented to person, place, and time.   Skin: Skin is warm and dry.   Psychiatric: He has a normal mood and affect. His speech is normal and behavior is normal. Thought content normal.   Nursing note and vitals reviewed.     Assessment:       1. Rhinosinusitis    2. " Hyperlipidemia, unspecified hyperlipidemia type    3. Gastroesophageal reflux disease, esophagitis presence not specified        Plan:       Rhinosinusitis  -     cefdinir (OMNICEF) 300 MG capsule; Take 1 capsule (300 mg total) by mouth 2 (two) times daily. for 10 days  Dispense: 20 capsule; Refill: 0  -     triamcinolone (NASACORT) 55 mcg nasal inhaler; 2 sprays by Nasal route once daily.  Dispense: 16.9 mL; Refill: 0    Hyperlipidemia, unspecified hyperlipidemia type  -     Lipid Panel; Future; Expected date: 06/10/2020  -     Comprehensive metabolic panel; Future; Expected date: 06/10/2020    Gastroesophageal reflux disease, esophagitis presence not specified  -     omeprazole (PRILOSEC) 20 MG capsule; Take 1 capsule (20 mg total) by mouth once daily.  Dispense: 30 capsule; Refill: 0        Follow up in about 6 months (around 12/10/2020), or if symptoms worsen or fail to improve, for hyperlipidemia .      6/10/2020 Franki Salcedo, JOLENE, FNP

## 2020-06-10 NOTE — PATIENT INSTRUCTIONS

## 2020-06-30 ENCOUNTER — PATIENT MESSAGE (OUTPATIENT)
Dept: FAMILY MEDICINE | Facility: CLINIC | Age: 45
End: 2020-06-30

## 2020-09-24 ENCOUNTER — PATIENT MESSAGE (OUTPATIENT)
Dept: FAMILY MEDICINE | Facility: CLINIC | Age: 45
End: 2020-09-24

## 2020-09-24 DIAGNOSIS — K21.9 GASTROESOPHAGEAL REFLUX DISEASE, ESOPHAGITIS PRESENCE NOT SPECIFIED: ICD-10-CM

## 2020-09-24 RX ORDER — OMEPRAZOLE 40 MG/1
40 CAPSULE, DELAYED RELEASE ORAL DAILY
Qty: 90 CAPSULE | Refills: 0 | Status: SHIPPED | OUTPATIENT
Start: 2020-09-24 | End: 2021-05-05

## 2021-02-08 ENCOUNTER — OFFICE VISIT (OUTPATIENT)
Dept: FAMILY MEDICINE | Facility: CLINIC | Age: 46
End: 2021-02-08
Payer: COMMERCIAL

## 2021-02-08 VITALS
TEMPERATURE: 98 F | HEART RATE: 84 BPM | DIASTOLIC BLOOD PRESSURE: 92 MMHG | WEIGHT: 197 LBS | OXYGEN SATURATION: 97 % | HEIGHT: 66 IN | SYSTOLIC BLOOD PRESSURE: 130 MMHG | BODY MASS INDEX: 31.66 KG/M2

## 2021-02-08 DIAGNOSIS — J30.9 ALLERGIC RHINITIS, UNSPECIFIED SEASONALITY, UNSPECIFIED TRIGGER: ICD-10-CM

## 2021-02-08 DIAGNOSIS — J01.01 ACUTE RECURRENT MAXILLARY SINUSITIS: Primary | ICD-10-CM

## 2021-02-08 PROCEDURE — 99213 PR OFFICE/OUTPT VISIT, EST, LEVL III, 20-29 MIN: ICD-10-PCS | Mod: S$GLB,,, | Performed by: INTERNAL MEDICINE

## 2021-02-08 PROCEDURE — 3008F PR BODY MASS INDEX (BMI) DOCUMENTED: ICD-10-PCS | Mod: S$GLB,,, | Performed by: INTERNAL MEDICINE

## 2021-02-08 PROCEDURE — 1125F PR PAIN SEVERITY QUANTIFIED, PAIN PRESENT: ICD-10-PCS | Mod: S$GLB,,, | Performed by: INTERNAL MEDICINE

## 2021-02-08 PROCEDURE — 99213 OFFICE O/P EST LOW 20 MIN: CPT | Mod: S$GLB,,, | Performed by: INTERNAL MEDICINE

## 2021-02-08 PROCEDURE — 3008F BODY MASS INDEX DOCD: CPT | Mod: S$GLB,,, | Performed by: INTERNAL MEDICINE

## 2021-02-08 PROCEDURE — 1125F AMNT PAIN NOTED PAIN PRSNT: CPT | Mod: S$GLB,,, | Performed by: INTERNAL MEDICINE

## 2021-02-08 RX ORDER — CEFDINIR 300 MG/1
300 CAPSULE ORAL 2 TIMES DAILY
Qty: 20 CAPSULE | Refills: 0 | Status: SHIPPED | OUTPATIENT
Start: 2021-02-08 | End: 2021-02-18

## 2021-02-08 RX ORDER — FLUTICASONE PROPIONATE 50 MCG
2 SPRAY, SUSPENSION (ML) NASAL DAILY
Qty: 48 G | Refills: 3 | Status: SHIPPED | OUTPATIENT
Start: 2021-02-08 | End: 2021-07-20

## 2021-02-08 RX ORDER — LORATADINE 10 MG/1
10 TABLET ORAL DAILY
Refills: 0 | COMMUNITY
Start: 2021-02-08 | End: 2021-07-20

## 2021-04-29 ENCOUNTER — PATIENT MESSAGE (OUTPATIENT)
Dept: RESEARCH | Facility: HOSPITAL | Age: 46
End: 2021-04-29

## 2021-05-05 ENCOUNTER — PATIENT MESSAGE (OUTPATIENT)
Dept: FAMILY MEDICINE | Facility: CLINIC | Age: 46
End: 2021-05-05

## 2021-05-05 ENCOUNTER — OFFICE VISIT (OUTPATIENT)
Dept: FAMILY MEDICINE | Facility: CLINIC | Age: 46
End: 2021-05-05
Payer: COMMERCIAL

## 2021-05-05 VITALS
BODY MASS INDEX: 31.34 KG/M2 | WEIGHT: 195 LBS | HEIGHT: 66 IN | SYSTOLIC BLOOD PRESSURE: 118 MMHG | OXYGEN SATURATION: 97 % | HEART RATE: 70 BPM | DIASTOLIC BLOOD PRESSURE: 90 MMHG | TEMPERATURE: 98 F

## 2021-05-05 DIAGNOSIS — M54.50 ACUTE RIGHT-SIDED LOW BACK PAIN WITHOUT SCIATICA: Primary | ICD-10-CM

## 2021-05-05 PROCEDURE — 1125F AMNT PAIN NOTED PAIN PRSNT: CPT | Mod: S$GLB,,, | Performed by: INTERNAL MEDICINE

## 2021-05-05 PROCEDURE — 3008F BODY MASS INDEX DOCD: CPT | Mod: S$GLB,,, | Performed by: INTERNAL MEDICINE

## 2021-05-05 PROCEDURE — 1125F PR PAIN SEVERITY QUANTIFIED, PAIN PRESENT: ICD-10-PCS | Mod: S$GLB,,, | Performed by: INTERNAL MEDICINE

## 2021-05-05 PROCEDURE — 3008F PR BODY MASS INDEX (BMI) DOCUMENTED: ICD-10-PCS | Mod: S$GLB,,, | Performed by: INTERNAL MEDICINE

## 2021-05-05 PROCEDURE — 96372 PR INJECTION,THERAP/PROPH/DIAG2ST, IM OR SUBCUT: ICD-10-PCS | Mod: S$GLB,,, | Performed by: INTERNAL MEDICINE

## 2021-05-05 PROCEDURE — 99213 PR OFFICE/OUTPT VISIT, EST, LEVL III, 20-29 MIN: ICD-10-PCS | Mod: 25,S$GLB,, | Performed by: INTERNAL MEDICINE

## 2021-05-05 PROCEDURE — 99213 OFFICE O/P EST LOW 20 MIN: CPT | Mod: 25,S$GLB,, | Performed by: INTERNAL MEDICINE

## 2021-05-05 PROCEDURE — 96372 THER/PROPH/DIAG INJ SC/IM: CPT | Mod: S$GLB,,, | Performed by: INTERNAL MEDICINE

## 2021-05-05 RX ORDER — TRAMADOL HYDROCHLORIDE 50 MG/1
50 TABLET ORAL
Qty: 30 TABLET | Refills: 0 | Status: SHIPPED | OUTPATIENT
Start: 2021-05-05 | End: 2021-07-20

## 2021-05-05 RX ORDER — TIZANIDINE 4 MG/1
4 TABLET ORAL EVERY 6 HOURS PRN
Qty: 60 TABLET | Refills: 1 | Status: SHIPPED | OUTPATIENT
Start: 2021-05-05 | End: 2021-05-15

## 2021-05-05 RX ORDER — NAPROXEN 500 MG/1
500 TABLET ORAL 2 TIMES DAILY WITH MEALS
Qty: 60 TABLET | Refills: 1 | Status: SHIPPED | OUTPATIENT
Start: 2021-05-05 | End: 2021-07-20

## 2021-05-05 RX ORDER — METHYLPREDNISOLONE ACETATE 80 MG/ML
80 INJECTION, SUSPENSION INTRA-ARTICULAR; INTRALESIONAL; INTRAMUSCULAR; SOFT TISSUE
Status: COMPLETED | OUTPATIENT
Start: 2021-05-05 | End: 2021-05-05

## 2021-05-05 RX ADMIN — METHYLPREDNISOLONE ACETATE 80 MG: 80 INJECTION, SUSPENSION INTRA-ARTICULAR; INTRALESIONAL; INTRAMUSCULAR; SOFT TISSUE at 02:05

## 2021-07-20 ENCOUNTER — OFFICE VISIT (OUTPATIENT)
Dept: FAMILY MEDICINE | Facility: CLINIC | Age: 46
End: 2021-07-20
Payer: COMMERCIAL

## 2021-07-20 VITALS
TEMPERATURE: 98 F | RESPIRATION RATE: 18 BRPM | HEART RATE: 82 BPM | OXYGEN SATURATION: 97 % | HEIGHT: 66 IN | DIASTOLIC BLOOD PRESSURE: 100 MMHG | SYSTOLIC BLOOD PRESSURE: 139 MMHG | BODY MASS INDEX: 31.88 KG/M2 | WEIGHT: 198.38 LBS

## 2021-07-20 DIAGNOSIS — E78.5 HYPERLIPIDEMIA, UNSPECIFIED HYPERLIPIDEMIA TYPE: ICD-10-CM

## 2021-07-20 DIAGNOSIS — R07.89 CHEST DISCOMFORT: Primary | ICD-10-CM

## 2021-07-20 DIAGNOSIS — F41.9 ANXIETY: ICD-10-CM

## 2021-07-20 DIAGNOSIS — R03.0 ELEVATED BLOOD-PRESSURE READING, WITHOUT DIAGNOSIS OF HYPERTENSION: ICD-10-CM

## 2021-07-20 DIAGNOSIS — I10 ESSENTIAL HYPERTENSION: ICD-10-CM

## 2021-07-20 PROCEDURE — 3008F BODY MASS INDEX DOCD: CPT | Mod: S$GLB,,, | Performed by: NURSE PRACTITIONER

## 2021-07-20 PROCEDURE — 99214 PR OFFICE/OUTPT VISIT, EST, LEVL IV, 30-39 MIN: ICD-10-PCS | Mod: 25,S$GLB,, | Performed by: NURSE PRACTITIONER

## 2021-07-20 PROCEDURE — 3075F PR MOST RECENT SYSTOLIC BLOOD PRESS GE 130-139MM HG: ICD-10-PCS | Mod: S$GLB,,, | Performed by: NURSE PRACTITIONER

## 2021-07-20 PROCEDURE — 3080F DIAST BP >= 90 MM HG: CPT | Mod: S$GLB,,, | Performed by: NURSE PRACTITIONER

## 2021-07-20 PROCEDURE — 1126F AMNT PAIN NOTED NONE PRSNT: CPT | Mod: S$GLB,,, | Performed by: NURSE PRACTITIONER

## 2021-07-20 PROCEDURE — 99214 OFFICE O/P EST MOD 30 MIN: CPT | Mod: 25,S$GLB,, | Performed by: NURSE PRACTITIONER

## 2021-07-20 PROCEDURE — 3075F SYST BP GE 130 - 139MM HG: CPT | Mod: S$GLB,,, | Performed by: NURSE PRACTITIONER

## 2021-07-20 PROCEDURE — 3008F PR BODY MASS INDEX (BMI) DOCUMENTED: ICD-10-PCS | Mod: S$GLB,,, | Performed by: NURSE PRACTITIONER

## 2021-07-20 PROCEDURE — 3080F PR MOST RECENT DIASTOLIC BLOOD PRESSURE >= 90 MM HG: ICD-10-PCS | Mod: S$GLB,,, | Performed by: NURSE PRACTITIONER

## 2021-07-20 PROCEDURE — 93000 ELECTROCARDIOGRAM COMPLETE: CPT | Mod: S$GLB,,, | Performed by: NURSE PRACTITIONER

## 2021-07-20 PROCEDURE — 93000 POCT EKG 12-LEAD: ICD-10-PCS | Mod: S$GLB,,, | Performed by: NURSE PRACTITIONER

## 2021-07-20 PROCEDURE — 1126F PR PAIN SEVERITY QUANTIFIED, NO PAIN PRESENT: ICD-10-PCS | Mod: S$GLB,,, | Performed by: NURSE PRACTITIONER

## 2021-07-20 RX ORDER — LISINOPRIL 10 MG/1
10 TABLET ORAL DAILY
Qty: 30 TABLET | Refills: 1 | Status: SHIPPED | OUTPATIENT
Start: 2021-07-20 | End: 2022-02-03 | Stop reason: SDUPTHER

## 2021-07-20 RX ORDER — BUSPIRONE HYDROCHLORIDE 5 MG/1
5 TABLET ORAL 2 TIMES DAILY
Qty: 60 TABLET | Refills: 1 | Status: SHIPPED | OUTPATIENT
Start: 2021-07-20 | End: 2022-02-03

## 2021-07-20 RX ORDER — PANTOPRAZOLE SODIUM 20 MG/1
20 TABLET, DELAYED RELEASE ORAL
Qty: 90 TABLET | Refills: 0 | Status: SHIPPED | OUTPATIENT
Start: 2021-07-20 | End: 2022-02-03

## 2021-09-20 ENCOUNTER — TELEPHONE (OUTPATIENT)
Dept: FAMILY MEDICINE | Facility: CLINIC | Age: 46
End: 2021-09-20

## 2021-09-20 LAB
EKG 12-LEAD: NORMAL
PR INTERVAL: NORMAL
PRT AXES: NORMAL
QRS DURATION: NORMAL
QT/QTC: NORMAL
VENTRICULAR RATE: NORMAL

## 2022-02-03 ENCOUNTER — OFFICE VISIT (OUTPATIENT)
Dept: FAMILY MEDICINE | Facility: CLINIC | Age: 47
End: 2022-02-03
Payer: COMMERCIAL

## 2022-02-03 VITALS
HEART RATE: 84 BPM | WEIGHT: 198 LBS | SYSTOLIC BLOOD PRESSURE: 130 MMHG | HEIGHT: 66 IN | BODY MASS INDEX: 31.82 KG/M2 | OXYGEN SATURATION: 97 % | TEMPERATURE: 97 F | DIASTOLIC BLOOD PRESSURE: 88 MMHG

## 2022-02-03 DIAGNOSIS — I10 ESSENTIAL HYPERTENSION: ICD-10-CM

## 2022-02-03 PROCEDURE — 3075F PR MOST RECENT SYSTOLIC BLOOD PRESS GE 130-139MM HG: ICD-10-PCS | Mod: S$GLB,,, | Performed by: INTERNAL MEDICINE

## 2022-02-03 PROCEDURE — 99213 PR OFFICE/OUTPT VISIT, EST, LEVL III, 20-29 MIN: ICD-10-PCS | Mod: S$GLB,,, | Performed by: INTERNAL MEDICINE

## 2022-02-03 PROCEDURE — 3079F PR MOST RECENT DIASTOLIC BLOOD PRESSURE 80-89 MM HG: ICD-10-PCS | Mod: S$GLB,,, | Performed by: INTERNAL MEDICINE

## 2022-02-03 PROCEDURE — 3075F SYST BP GE 130 - 139MM HG: CPT | Mod: S$GLB,,, | Performed by: INTERNAL MEDICINE

## 2022-02-03 PROCEDURE — 99213 OFFICE O/P EST LOW 20 MIN: CPT | Mod: S$GLB,,, | Performed by: INTERNAL MEDICINE

## 2022-02-03 PROCEDURE — 3079F DIAST BP 80-89 MM HG: CPT | Mod: S$GLB,,, | Performed by: INTERNAL MEDICINE

## 2022-02-03 PROCEDURE — 3008F BODY MASS INDEX DOCD: CPT | Mod: S$GLB,,, | Performed by: INTERNAL MEDICINE

## 2022-02-03 PROCEDURE — 3008F PR BODY MASS INDEX (BMI) DOCUMENTED: ICD-10-PCS | Mod: S$GLB,,, | Performed by: INTERNAL MEDICINE

## 2022-02-03 RX ORDER — LISINOPRIL 10 MG/1
10 TABLET ORAL DAILY
Qty: 90 TABLET | Refills: 1 | Status: SHIPPED | OUTPATIENT
Start: 2022-02-03 | End: 2022-05-04 | Stop reason: SDUPTHER

## 2022-02-03 NOTE — PROGRESS NOTES
Subjective:       Patient ID: Jem Alvarez is a 46 y.o. male.    Chief Complaint: Hypertension    Here for follow up on blood pressure.  He was started on lisinopril last summer by one of the FNPs in the network but he never followed up and stopped the meds when the Rx ran out.  He was checking BP while it was on the medicine it was better.  He has been having some headaches, foggy vision, tingling in lips recently.  He checked BP yesterday and it was 140s/90s.   He finds that it tends to be higher in the evenings.     Review of Systems   Constitutional: Negative for activity change, appetite change, chills, diaphoresis, fatigue, fever, malaise/fatigue and unexpected weight change.   HENT: Negative for congestion, ear discharge, ear pain, hearing loss, nosebleeds, postnasal drip, rhinorrhea, sinus pressure, sinus pain, sneezing, sore throat, tinnitus, trouble swallowing and voice change.    Eyes: Negative for photophobia, pain, discharge, redness, itching and visual disturbance.   Respiratory: Negative for apnea, cough, choking, chest tightness, shortness of breath and wheezing.    Cardiovascular: Positive for palpitations. Negative for chest pain and leg swelling.   Gastrointestinal: Negative for abdominal distention, abdominal pain, blood in stool, constipation, diarrhea, nausea and vomiting.   Endocrine: Negative for cold intolerance, heat intolerance, polydipsia and polyuria.   Genitourinary: Negative for decreased urine volume, difficulty urinating, dysuria, enuresis, flank pain, frequency, genital sores, hematuria, penile discharge, penile pain, scrotal swelling, testicular pain and urgency.   Musculoskeletal: Negative for arthralgias, back pain, gait problem, joint swelling, myalgias, neck pain and neck stiffness.   Skin: Negative for rash and wound.   Allergic/Immunologic: Negative for environmental allergies, food allergies and immunocompromised state.   Neurological: Positive for dizziness. Negative  "for tremors, seizures, syncope, facial asymmetry, speech difficulty, weakness, light-headedness, numbness and headaches.   Hematological: Negative for adenopathy. Does not bruise/bleed easily.   Psychiatric/Behavioral: Negative for confusion, decreased concentration, hallucinations, self-injury, sleep disturbance and suicidal ideas. The patient is nervous/anxious.        Past Medical History:   Diagnosis Date    Anxiety     Hypertension     not on meds      Past Surgical History:   Procedure Laterality Date    REPAIR OF TORSION OF TESTICLE Right     age 15    VASECTOMY         Family History   Problem Relation Age of Onset    Hypertension Unknown     No Known Problems Mother     Hypertension Father     Arthritis Father     Nephrolithiasis Father     Stroke Maternal Grandmother     No Known Problems Maternal Grandfather     Stroke Paternal Grandmother     No Known Problems Paternal Grandfather     Hodgkin's lymphoma Son         age 17    Diabetes Neg Hx        Social History     Socioeconomic History    Marital status:    Occupational History    Occupation:     Occupation: fire department   Tobacco Use    Smoking status: Never Smoker    Smokeless tobacco: Never Used   Substance and Sexual Activity    Alcohol use: Yes     Comment: seldom    Drug use: No    Sexual activity: Yes   Social History Narrative    Live with partner       Current Outpatient Medications   Medication Sig Dispense Refill    lisinopriL 10 MG tablet Take 1 tablet (10 mg total) by mouth once daily. 90 tablet 1     No current facility-administered medications for this visit.       Review of patient's allergies indicates:  No Known Allergies  Objective:      Blood pressure 130/88, pulse 84, temperature 97.1 °F (36.2 °C), temperature source Temporal, height 5' 6" (1.676 m), weight 89.8 kg (198 lb), SpO2 97 %. Body mass index is 31.96 kg/m².   Physical Exam  Vitals and nursing note reviewed. "   Constitutional:       General: He is not in acute distress.     Appearance: He is well-developed. He is not ill-appearing, toxic-appearing or diaphoretic.   HENT:      Head: Normocephalic and atraumatic.   Eyes:      General: No scleral icterus.        Right eye: No discharge.         Left eye: No discharge.      Conjunctiva/sclera: Conjunctivae normal.   Neck:      Vascular: No carotid bruit.   Cardiovascular:      Rate and Rhythm: Normal rate and regular rhythm.      Heart sounds: Normal heart sounds. No murmur heard.      Pulmonary:      Effort: Pulmonary effort is normal. No respiratory distress.      Breath sounds: Normal breath sounds. No decreased breath sounds, wheezing, rhonchi or rales.   Abdominal:      General: There is no distension.      Palpations: Abdomen is soft.      Tenderness: There is no abdominal tenderness. There is no guarding or rebound.   Musculoskeletal:      Right lower leg: No edema.      Left lower leg: No edema.   Skin:     General: Skin is warm and dry.   Neurological:      Mental Status: He is alert.      Motor: No tremor.   Psychiatric:         Mood and Affect: Mood normal.         Speech: Speech normal.         Behavior: Behavior normal.             Assessment:       1. Essential hypertension        Plan:       Jem was seen today for hypertension.    Diagnoses and all orders for this visit:    Essential hypertension  Comments:  Encouraged previously ordered labs and compliance with meds and follow up  Orders:  -     lisinopriL 10 MG tablet; Take 1 tablet (10 mg total) by mouth once daily.

## 2022-05-04 ENCOUNTER — OFFICE VISIT (OUTPATIENT)
Dept: FAMILY MEDICINE | Facility: CLINIC | Age: 47
End: 2022-05-04
Payer: COMMERCIAL

## 2022-05-04 VITALS
WEIGHT: 193 LBS | SYSTOLIC BLOOD PRESSURE: 120 MMHG | HEART RATE: 77 BPM | OXYGEN SATURATION: 97 % | BODY MASS INDEX: 31.02 KG/M2 | DIASTOLIC BLOOD PRESSURE: 80 MMHG | HEIGHT: 66 IN | TEMPERATURE: 99 F

## 2022-05-04 DIAGNOSIS — F41.9 ANXIETY: ICD-10-CM

## 2022-05-04 DIAGNOSIS — Z00.00 PREVENTATIVE HEALTH CARE: Primary | ICD-10-CM

## 2022-05-04 DIAGNOSIS — I10 ESSENTIAL HYPERTENSION: ICD-10-CM

## 2022-05-04 DIAGNOSIS — Z11.59 NEED FOR HEPATITIS C SCREENING TEST: ICD-10-CM

## 2022-05-04 PROCEDURE — 4010F ACE/ARB THERAPY RXD/TAKEN: CPT | Mod: CPTII,S$GLB,, | Performed by: NURSE PRACTITIONER

## 2022-05-04 PROCEDURE — 3079F PR MOST RECENT DIASTOLIC BLOOD PRESSURE 80-89 MM HG: ICD-10-PCS | Mod: CPTII,S$GLB,, | Performed by: NURSE PRACTITIONER

## 2022-05-04 PROCEDURE — 3008F BODY MASS INDEX DOCD: CPT | Mod: CPTII,S$GLB,, | Performed by: NURSE PRACTITIONER

## 2022-05-04 PROCEDURE — 4010F PR ACE/ARB THEARPY RXD/TAKEN: ICD-10-PCS | Mod: CPTII,S$GLB,, | Performed by: NURSE PRACTITIONER

## 2022-05-04 PROCEDURE — 1160F RVW MEDS BY RX/DR IN RCRD: CPT | Mod: CPTII,S$GLB,, | Performed by: NURSE PRACTITIONER

## 2022-05-04 PROCEDURE — 3008F PR BODY MASS INDEX (BMI) DOCUMENTED: ICD-10-PCS | Mod: CPTII,S$GLB,, | Performed by: NURSE PRACTITIONER

## 2022-05-04 PROCEDURE — 1160F PR REVIEW ALL MEDS BY PRESCRIBER/CLIN PHARMACIST DOCUMENTED: ICD-10-PCS | Mod: CPTII,S$GLB,, | Performed by: NURSE PRACTITIONER

## 2022-05-04 PROCEDURE — 99396 PR PREVENTIVE VISIT,EST,40-64: ICD-10-PCS | Mod: S$GLB,,, | Performed by: NURSE PRACTITIONER

## 2022-05-04 PROCEDURE — 1159F PR MEDICATION LIST DOCUMENTED IN MEDICAL RECORD: ICD-10-PCS | Mod: CPTII,S$GLB,, | Performed by: NURSE PRACTITIONER

## 2022-05-04 PROCEDURE — 3074F SYST BP LT 130 MM HG: CPT | Mod: CPTII,S$GLB,, | Performed by: NURSE PRACTITIONER

## 2022-05-04 PROCEDURE — 3079F DIAST BP 80-89 MM HG: CPT | Mod: CPTII,S$GLB,, | Performed by: NURSE PRACTITIONER

## 2022-05-04 PROCEDURE — 1159F MED LIST DOCD IN RCRD: CPT | Mod: CPTII,S$GLB,, | Performed by: NURSE PRACTITIONER

## 2022-05-04 PROCEDURE — 3074F PR MOST RECENT SYSTOLIC BLOOD PRESSURE < 130 MM HG: ICD-10-PCS | Mod: CPTII,S$GLB,, | Performed by: NURSE PRACTITIONER

## 2022-05-04 PROCEDURE — 99396 PREV VISIT EST AGE 40-64: CPT | Mod: S$GLB,,, | Performed by: NURSE PRACTITIONER

## 2022-05-04 RX ORDER — LISINOPRIL 10 MG/1
10 TABLET ORAL DAILY
Qty: 90 TABLET | Refills: 1 | Status: ON HOLD | OUTPATIENT
Start: 2022-05-04 | End: 2022-10-14 | Stop reason: HOSPADM

## 2022-05-04 RX ORDER — BUSPIRONE HYDROCHLORIDE 5 MG/1
5 TABLET ORAL 2 TIMES DAILY
Qty: 60 TABLET | Refills: 1 | Status: SHIPPED | OUTPATIENT
Start: 2022-05-04 | End: 2022-10-11

## 2022-05-04 NOTE — PROGRESS NOTES
"    SUBJECTIVE:      Patient ID: Jem Alvarez is a 46 y.o. male.    Chief Complaint: Hypertension    Patient is here today for his annual exam. He is due for routine labs. Will be due for a colonoscopy this year. He is doing well on lisinopril, bp is well controlled. He has "bouts of anxiety"  Has been prescribed buspar but he prefers not to take meds daily for anxiety. Would like to take something as needed     Hypertension  This is a chronic problem. The current episode started more than 1 year ago. The problem is controlled. Pertinent negatives include no anxiety, chest pain, headaches, malaise/fatigue, palpitations, peripheral edema or shortness of breath. Risk factors for coronary artery disease include male gender. Past treatments include ACE inhibitors. The current treatment provides moderate improvement.   Anxiety  Presents for follow-up visit. Symptoms include excessive worry, insomnia and nervous/anxious behavior. Patient reports no chest pain, confusion, dizziness, palpitations, shortness of breath or suicidal ideas. Symptoms occur occasionally. The severity of symptoms is mild. The quality of sleep is good. Nighttime awakenings: occasional.     Compliance with medications is 0-25%.       Past Surgical History:   Procedure Laterality Date    REPAIR OF TORSION OF TESTICLE Right     age 15    VASECTOMY       Family History   Problem Relation Age of Onset    Hypertension Unknown     No Known Problems Mother     Hypertension Father     Arthritis Father     Nephrolithiasis Father     Stroke Maternal Grandmother     No Known Problems Maternal Grandfather     Stroke Paternal Grandmother     No Known Problems Paternal Grandfather     Hodgkin's lymphoma Son         age 17    Diabetes Neg Hx       Social History     Socioeconomic History    Marital status:    Occupational History    Occupation:     Occupation: fire department   Tobacco Use    Smoking status: " Never Smoker    Smokeless tobacco: Never Used   Substance and Sexual Activity    Alcohol use: Yes     Comment: seldom    Drug use: No    Sexual activity: Yes   Social History Narrative    Live with partner     Current Outpatient Medications   Medication Sig Dispense Refill    busPIRone (BUSPAR) 5 MG Tab Take 1 tablet (5 mg total) by mouth 2 (two) times daily. 60 tablet 1    lisinopriL 10 MG tablet Take 1 tablet (10 mg total) by mouth once daily. 90 tablet 1     No current facility-administered medications for this visit.     Review of patient's allergies indicates:  No Known Allergies   Past Medical History:   Diagnosis Date    Anxiety     Hypertension     not on meds     Past Surgical History:   Procedure Laterality Date    REPAIR OF TORSION OF TESTICLE Right     age 15    VASECTOMY         Review of Systems   Constitutional: Negative for appetite change, chills, diaphoresis, malaise/fatigue and unexpected weight change.   HENT: Negative for ear discharge, facial swelling, hearing loss, nosebleeds and trouble swallowing.    Eyes: Negative for photophobia, pain and visual disturbance.   Respiratory: Negative for apnea, choking, shortness of breath and wheezing.    Cardiovascular: Negative for chest pain and palpitations.   Gastrointestinal: Negative for abdominal pain, blood in stool and vomiting.   Endocrine: Negative for polyphagia.   Genitourinary: Negative for difficulty urinating and hematuria.   Musculoskeletal: Negative for gait problem and joint swelling.   Skin: Negative for pallor.   Neurological: Negative for dizziness, seizures, speech difficulty, weakness, light-headedness and headaches.   Hematological: Does not bruise/bleed easily.   Psychiatric/Behavioral: Negative for agitation, confusion, dysphoric mood, self-injury, sleep disturbance and suicidal ideas. The patient is nervous/anxious and has insomnia.       OBJECTIVE:      Vitals:    05/04/22 0829   BP: 120/80   Pulse: 77   Temp: 98.6 °F  "(37 °C)   SpO2: 97%   Weight: 87.5 kg (193 lb)   Height: 5' 6" (1.676 m)     Physical Exam  Vitals and nursing note reviewed.   Constitutional:       General: He is not in acute distress.     Appearance: He is well-developed.   HENT:      Head: Normocephalic and atraumatic.      Nose: Nose normal.      Mouth/Throat:      Pharynx: Uvula midline.   Eyes:      General: Lids are normal.      Conjunctiva/sclera: Conjunctivae normal.      Pupils: Pupils are equal, round, and reactive to light.      Right eye: Pupil is round and reactive.      Left eye: Pupil is round and reactive.   Neck:      Thyroid: No thyromegaly.      Vascular: No carotid bruit.   Cardiovascular:      Rate and Rhythm: Normal rate and regular rhythm.      Pulses: Normal pulses.      Heart sounds: Normal heart sounds. No murmur heard.  Pulmonary:      Effort: Pulmonary effort is normal.      Breath sounds: Normal breath sounds. No wheezing, rhonchi or rales.   Abdominal:      General: Bowel sounds are normal.      Palpations: Abdomen is soft. Abdomen is not rigid.      Tenderness: There is no abdominal tenderness.   Musculoskeletal:         General: Normal range of motion.      Cervical back: Normal range of motion and neck supple.      Right lower leg: No edema.      Left lower leg: No edema.   Lymphadenopathy:      Cervical: No cervical adenopathy.   Skin:     General: Skin is warm and dry.      Nails: There is no clubbing.   Neurological:      Mental Status: He is alert and oriented to person, place, and time.   Psychiatric:         Mood and Affect: Mood normal.         Speech: Speech normal.         Behavior: Behavior normal. Behavior is cooperative.         Thought Content: Thought content normal.         Judgment: Judgment normal.        Assessment:       1. Preventative health care    2. Essential hypertension    3. Anxiety    4. Need for hepatitis C screening test        Plan:       Preventative health care  -     Comprehensive Metabolic Panel; " Future; Expected date: 05/18/2022  -     Lipid Panel; Future; Expected date: 05/18/2022  -     TSH; Future; Expected date: 06/15/2022  -     CBC Auto Differential; Future; Expected date: 05/18/2022  -     Urinalysis; Future; Expected date: 05/18/2022    Essential hypertension  -     lisinopriL 10 MG tablet; Take 1 tablet (10 mg total) by mouth once daily.  Dispense: 90 tablet; Refill: 1    Anxiety  -     busPIRone (BUSPAR) 5 MG Tab; Take 1 tablet (5 mg total) by mouth 2 (two) times daily.  Dispense: 60 tablet; Refill: 1    Need for hepatitis C screening test  -     Hepatitis C antibody; Future; Expected date: 05/04/2022        Follow up in about 6 months (around 11/4/2022) for htn.      5/4/2022 JOLENE Ibrahim, FNP

## 2022-06-30 ENCOUNTER — OCCUPATIONAL HEALTH (OUTPATIENT)
Dept: URGENT CARE | Facility: CLINIC | Age: 47
End: 2022-06-30
Payer: OTHER MISCELLANEOUS

## 2022-06-30 DIAGNOSIS — Z02.83 ENCOUNTER FOR DRUG SCREENING: Primary | ICD-10-CM

## 2022-06-30 LAB
BREATH ALCOHOL: 0
CTP QC/QA: YES
POC 10 PANEL DRUG SCREEN: NEGATIVE

## 2022-06-30 PROCEDURE — 82075 ASSAY OF BREATH ETHANOL: CPT | Mod: S$GLB,,, | Performed by: FAMILY MEDICINE

## 2022-06-30 PROCEDURE — 80305 POCT RAPID DRUG SCREEN 10 PANEL: ICD-10-PCS | Mod: S$GLB,,, | Performed by: FAMILY MEDICINE

## 2022-06-30 PROCEDURE — 82075 POCT BREATH ALCOHOL TEST: ICD-10-PCS | Mod: S$GLB,,, | Performed by: FAMILY MEDICINE

## 2022-06-30 PROCEDURE — 80305 DRUG TEST PRSMV DIR OPT OBS: CPT | Mod: S$GLB,,, | Performed by: FAMILY MEDICINE

## 2022-08-22 ENCOUNTER — LAB VISIT (OUTPATIENT)
Dept: LAB | Facility: HOSPITAL | Age: 47
End: 2022-08-22
Attending: NURSE PRACTITIONER
Payer: COMMERCIAL

## 2022-08-22 DIAGNOSIS — Z00.00 PREVENTATIVE HEALTH CARE: ICD-10-CM

## 2022-08-22 DIAGNOSIS — Z11.59 NEED FOR HEPATITIS C SCREENING TEST: ICD-10-CM

## 2022-08-22 LAB
ALBUMIN SERPL BCP-MCNC: 4.4 G/DL (ref 3.5–5.2)
ALP SERPL-CCNC: 61 U/L (ref 55–135)
ALT SERPL W/O P-5'-P-CCNC: 31 U/L (ref 10–44)
ANION GAP SERPL CALC-SCNC: 5 MMOL/L (ref 8–16)
AST SERPL-CCNC: 16 U/L (ref 10–40)
BASOPHILS # BLD AUTO: 0.09 K/UL (ref 0–0.2)
BASOPHILS NFR BLD: 1.1 % (ref 0–1.9)
BILIRUB SERPL-MCNC: 0.8 MG/DL (ref 0.1–1)
BILIRUB UR QL STRIP: NEGATIVE
BUN SERPL-MCNC: 18 MG/DL (ref 6–20)
CALCIUM SERPL-MCNC: 9.5 MG/DL (ref 8.7–10.5)
CHLORIDE SERPL-SCNC: 105 MMOL/L (ref 95–110)
CHOLEST SERPL-MCNC: 180 MG/DL (ref 120–199)
CHOLEST/HDLC SERPL: 6 {RATIO} (ref 2–5)
CLARITY UR: CLEAR
CO2 SERPL-SCNC: 28 MMOL/L (ref 23–29)
COLOR UR: YELLOW
CREAT SERPL-MCNC: 1 MG/DL (ref 0.5–1.4)
DIFFERENTIAL METHOD: ABNORMAL
EOSINOPHIL # BLD AUTO: 0.2 K/UL (ref 0–0.5)
EOSINOPHIL NFR BLD: 2.1 % (ref 0–8)
ERYTHROCYTE [DISTWIDTH] IN BLOOD BY AUTOMATED COUNT: 12.1 % (ref 11.5–14.5)
EST. GFR  (NO RACE VARIABLE): >60 ML/MIN/1.73 M^2
GLUCOSE SERPL-MCNC: 88 MG/DL (ref 70–110)
GLUCOSE UR QL STRIP: NEGATIVE
HCT VFR BLD AUTO: 48.2 % (ref 40–54)
HDLC SERPL-MCNC: 30 MG/DL (ref 40–75)
HDLC SERPL: 16.7 % (ref 20–50)
HGB BLD-MCNC: 15.5 G/DL (ref 14–18)
HGB UR QL STRIP: NEGATIVE
IMM GRANULOCYTES # BLD AUTO: 0.09 K/UL (ref 0–0.04)
IMM GRANULOCYTES NFR BLD AUTO: 1.1 % (ref 0–0.5)
KETONES UR QL STRIP: NEGATIVE
LDLC SERPL CALC-MCNC: 135 MG/DL (ref 63–159)
LEUKOCYTE ESTERASE UR QL STRIP: NEGATIVE
LYMPHOCYTES # BLD AUTO: 2.6 K/UL (ref 1–4.8)
LYMPHOCYTES NFR BLD: 31.9 % (ref 18–48)
MCH RBC QN AUTO: 30.2 PG (ref 27–31)
MCHC RBC AUTO-ENTMCNC: 32.2 G/DL (ref 32–36)
MCV RBC AUTO: 94 FL (ref 82–98)
MONOCYTES # BLD AUTO: 0.8 K/UL (ref 0.3–1)
MONOCYTES NFR BLD: 10.2 % (ref 4–15)
NEUTROPHILS # BLD AUTO: 4.3 K/UL (ref 1.8–7.7)
NEUTROPHILS NFR BLD: 53.6 % (ref 38–73)
NITRITE UR QL STRIP: NEGATIVE
NONHDLC SERPL-MCNC: 150 MG/DL
NRBC BLD-RTO: 0 /100 WBC
PH UR STRIP: 6 [PH] (ref 5–8)
PLATELET # BLD AUTO: 269 K/UL (ref 150–450)
PMV BLD AUTO: 10.4 FL (ref 9.2–12.9)
POTASSIUM SERPL-SCNC: 4.1 MMOL/L (ref 3.5–5.1)
PROT SERPL-MCNC: 7.2 G/DL (ref 6–8.4)
PROT UR QL STRIP: NEGATIVE
RBC # BLD AUTO: 5.14 M/UL (ref 4.6–6.2)
SODIUM SERPL-SCNC: 138 MMOL/L (ref 136–145)
SP GR UR STRIP: 1.02 (ref 1–1.03)
TRIGL SERPL-MCNC: 75 MG/DL (ref 30–150)
TSH SERPL DL<=0.005 MIU/L-ACNC: 0.65 UIU/ML (ref 0.34–5.6)
URN SPEC COLLECT METH UR: NORMAL
UROBILINOGEN UR STRIP-ACNC: NEGATIVE EU/DL
WBC # BLD AUTO: 8.1 K/UL (ref 3.9–12.7)

## 2022-08-22 PROCEDURE — 81003 URINALYSIS AUTO W/O SCOPE: CPT | Performed by: NURSE PRACTITIONER

## 2022-08-22 PROCEDURE — 84443 ASSAY THYROID STIM HORMONE: CPT | Performed by: NURSE PRACTITIONER

## 2022-08-22 PROCEDURE — 85025 COMPLETE CBC W/AUTO DIFF WBC: CPT | Performed by: NURSE PRACTITIONER

## 2022-08-22 PROCEDURE — 80053 COMPREHEN METABOLIC PANEL: CPT | Performed by: NURSE PRACTITIONER

## 2022-08-22 PROCEDURE — 80061 LIPID PANEL: CPT | Performed by: NURSE PRACTITIONER

## 2022-08-22 PROCEDURE — 86803 HEPATITIS C AB TEST: CPT | Performed by: NURSE PRACTITIONER

## 2022-08-22 PROCEDURE — 36415 COLL VENOUS BLD VENIPUNCTURE: CPT | Performed by: NURSE PRACTITIONER

## 2022-08-23 ENCOUNTER — PATIENT MESSAGE (OUTPATIENT)
Dept: FAMILY MEDICINE | Facility: CLINIC | Age: 47
End: 2022-08-23

## 2022-08-23 LAB — HCV AB S/CO SERPL IA: <0.1 S/CO RATIO (ref 0–0.9)

## 2022-10-11 ENCOUNTER — PATIENT MESSAGE (OUTPATIENT)
Dept: FAMILY MEDICINE | Facility: CLINIC | Age: 47
End: 2022-10-11

## 2022-10-11 ENCOUNTER — HOSPITAL ENCOUNTER (INPATIENT)
Facility: HOSPITAL | Age: 47
LOS: 3 days | Discharge: HOME | DRG: 661 | End: 2022-10-14
Attending: EMERGENCY MEDICINE | Admitting: INTERNAL MEDICINE
Payer: COMMERCIAL

## 2022-10-11 DIAGNOSIS — N13.2 HYDRONEPHROSIS WITH URINARY OBSTRUCTION DUE TO RENAL CALCULUS: Primary | ICD-10-CM

## 2022-10-11 DIAGNOSIS — N20.1 URETEROLITHIASIS: ICD-10-CM

## 2022-10-11 DIAGNOSIS — N13.2 HYDRONEPHROSIS WITH OBSTRUCTING CALCULUS: ICD-10-CM

## 2022-10-11 PROBLEM — I10 PRIMARY HYPERTENSION: Status: ACTIVE | Noted: 2022-10-11

## 2022-10-11 PROBLEM — N12 PYELONEPHRITIS: Status: ACTIVE | Noted: 2022-10-11

## 2022-10-11 PROBLEM — N20.0 RENAL LITHIASIS: Status: ACTIVE | Noted: 2022-10-11

## 2022-10-11 PROBLEM — N17.9 AKI (ACUTE KIDNEY INJURY): Status: ACTIVE | Noted: 2022-10-11

## 2022-10-11 LAB
ALBUMIN SERPL BCP-MCNC: 4.6 G/DL (ref 3.5–5.2)
ALP SERPL-CCNC: 62 U/L (ref 55–135)
ALT SERPL W/O P-5'-P-CCNC: 28 U/L (ref 10–44)
ANION GAP SERPL CALC-SCNC: 6 MMOL/L (ref 8–16)
AST SERPL-CCNC: 15 U/L (ref 10–40)
BASOPHILS # BLD AUTO: 0.04 K/UL (ref 0–0.2)
BASOPHILS NFR BLD: 0.2 % (ref 0–1.9)
BILIRUB SERPL-MCNC: 0.9 MG/DL (ref 0.1–1)
BILIRUB UR QL STRIP: NEGATIVE
BUN SERPL-MCNC: 19 MG/DL (ref 6–20)
CALCIUM SERPL-MCNC: 9.3 MG/DL (ref 8.7–10.5)
CHLORIDE SERPL-SCNC: 100 MMOL/L (ref 95–110)
CLARITY UR: CLEAR
CO2 SERPL-SCNC: 24 MMOL/L (ref 23–29)
COLOR UR: YELLOW
CREAT SERPL-MCNC: 2 MG/DL (ref 0.5–1.4)
DIFFERENTIAL METHOD: ABNORMAL
EOSINOPHIL # BLD AUTO: 0 K/UL (ref 0–0.5)
EOSINOPHIL NFR BLD: 0.1 % (ref 0–8)
ERYTHROCYTE [DISTWIDTH] IN BLOOD BY AUTOMATED COUNT: 12.1 % (ref 11.5–14.5)
EST. GFR  (NO RACE VARIABLE): 41.2 ML/MIN/1.73 M^2
GLUCOSE SERPL-MCNC: 102 MG/DL (ref 70–110)
GLUCOSE UR QL STRIP: NEGATIVE
HCT VFR BLD AUTO: 46.9 % (ref 40–54)
HGB BLD-MCNC: 15.3 G/DL (ref 14–18)
HGB UR QL STRIP: NEGATIVE
IMM GRANULOCYTES # BLD AUTO: 0.08 K/UL (ref 0–0.04)
IMM GRANULOCYTES NFR BLD AUTO: 0.5 % (ref 0–0.5)
KETONES UR QL STRIP: NEGATIVE
LEUKOCYTE ESTERASE UR QL STRIP: NEGATIVE
LYMPHOCYTES # BLD AUTO: 1.6 K/UL (ref 1–4.8)
LYMPHOCYTES NFR BLD: 9.4 % (ref 18–48)
MCH RBC QN AUTO: 30.1 PG (ref 27–31)
MCHC RBC AUTO-ENTMCNC: 32.6 G/DL (ref 32–36)
MCV RBC AUTO: 92 FL (ref 82–98)
MONOCYTES # BLD AUTO: 1.4 K/UL (ref 0.3–1)
MONOCYTES NFR BLD: 8.6 % (ref 4–15)
NEUTROPHILS # BLD AUTO: 13.6 K/UL (ref 1.8–7.7)
NEUTROPHILS NFR BLD: 81.2 % (ref 38–73)
NITRITE UR QL STRIP: NEGATIVE
NRBC BLD-RTO: 0 /100 WBC
PH UR STRIP: 6 [PH] (ref 5–8)
PLATELET # BLD AUTO: 283 K/UL (ref 150–450)
PMV BLD AUTO: 9.8 FL (ref 9.2–12.9)
POTASSIUM SERPL-SCNC: 4.1 MMOL/L (ref 3.5–5.1)
PROT SERPL-MCNC: 7.4 G/DL (ref 6–8.4)
PROT UR QL STRIP: NEGATIVE
RBC # BLD AUTO: 5.09 M/UL (ref 4.6–6.2)
SODIUM SERPL-SCNC: 130 MMOL/L (ref 136–145)
SP GR UR STRIP: 1.02 (ref 1–1.03)
URN SPEC COLLECT METH UR: NORMAL
UROBILINOGEN UR STRIP-ACNC: NEGATIVE EU/DL
WBC # BLD AUTO: 16.77 K/UL (ref 3.9–12.7)

## 2022-10-11 PROCEDURE — 81003 URINALYSIS AUTO W/O SCOPE: CPT | Performed by: EMERGENCY MEDICINE

## 2022-10-11 PROCEDURE — 96375 TX/PRO/DX INJ NEW DRUG ADDON: CPT

## 2022-10-11 PROCEDURE — 99285 EMERGENCY DEPT VISIT HI MDM: CPT | Mod: 25

## 2022-10-11 PROCEDURE — 25000003 PHARM REV CODE 250: Performed by: EMERGENCY MEDICINE

## 2022-10-11 PROCEDURE — 51798 US URINE CAPACITY MEASURE: CPT

## 2022-10-11 PROCEDURE — 85025 COMPLETE CBC W/AUTO DIFF WBC: CPT | Performed by: EMERGENCY MEDICINE

## 2022-10-11 PROCEDURE — 63600175 PHARM REV CODE 636 W HCPCS: Performed by: UROLOGY

## 2022-10-11 PROCEDURE — 63600175 PHARM REV CODE 636 W HCPCS: Performed by: EMERGENCY MEDICINE

## 2022-10-11 PROCEDURE — 96365 THER/PROPH/DIAG IV INF INIT: CPT

## 2022-10-11 PROCEDURE — 80053 COMPREHEN METABOLIC PANEL: CPT | Performed by: EMERGENCY MEDICINE

## 2022-10-11 PROCEDURE — 12000002 HC ACUTE/MED SURGE SEMI-PRIVATE ROOM

## 2022-10-11 PROCEDURE — 63600175 PHARM REV CODE 636 W HCPCS: Performed by: INTERNAL MEDICINE

## 2022-10-11 RX ORDER — ACETAMINOPHEN 325 MG/1
650 TABLET ORAL EVERY 8 HOURS PRN
Status: DISCONTINUED | OUTPATIENT
Start: 2022-10-11 | End: 2022-10-14 | Stop reason: HOSPADM

## 2022-10-11 RX ORDER — KETOROLAC TROMETHAMINE 30 MG/ML
10 INJECTION, SOLUTION INTRAMUSCULAR; INTRAVENOUS
Status: COMPLETED | OUTPATIENT
Start: 2022-10-11 | End: 2022-10-11

## 2022-10-11 RX ORDER — TAMSULOSIN HYDROCHLORIDE 0.4 MG/1
0.4 CAPSULE ORAL NIGHTLY
Status: DISCONTINUED | OUTPATIENT
Start: 2022-10-11 | End: 2022-10-11

## 2022-10-11 RX ORDER — BUSPIRONE HYDROCHLORIDE 5 MG/1
5 TABLET ORAL 2 TIMES DAILY
Status: DISCONTINUED | OUTPATIENT
Start: 2022-10-11 | End: 2022-10-14 | Stop reason: HOSPADM

## 2022-10-11 RX ORDER — SODIUM CHLORIDE, SODIUM LACTATE, POTASSIUM CHLORIDE, CALCIUM CHLORIDE 600; 310; 30; 20 MG/100ML; MG/100ML; MG/100ML; MG/100ML
INJECTION, SOLUTION INTRAVENOUS CONTINUOUS
Status: DISCONTINUED | OUTPATIENT
Start: 2022-10-11 | End: 2022-10-14 | Stop reason: HOSPADM

## 2022-10-11 RX ORDER — HYDRALAZINE HYDROCHLORIDE 20 MG/ML
10 INJECTION INTRAMUSCULAR; INTRAVENOUS EVERY 8 HOURS PRN
Status: DISCONTINUED | OUTPATIENT
Start: 2022-10-11 | End: 2022-10-14 | Stop reason: HOSPADM

## 2022-10-11 RX ORDER — TAMSULOSIN HYDROCHLORIDE 0.4 MG/1
0.4 CAPSULE ORAL DAILY
Status: DISCONTINUED | OUTPATIENT
Start: 2022-10-12 | End: 2022-10-14 | Stop reason: HOSPADM

## 2022-10-11 RX ORDER — MORPHINE SULFATE 2 MG/ML
2 INJECTION, SOLUTION INTRAMUSCULAR; INTRAVENOUS EVERY 4 HOURS PRN
Status: DISCONTINUED | OUTPATIENT
Start: 2022-10-11 | End: 2022-10-12

## 2022-10-11 RX ORDER — TALC
6 POWDER (GRAM) TOPICAL NIGHTLY PRN
Status: DISCONTINUED | OUTPATIENT
Start: 2022-10-11 | End: 2022-10-14 | Stop reason: HOSPADM

## 2022-10-11 RX ORDER — ENOXAPARIN SODIUM 100 MG/ML
40 INJECTION SUBCUTANEOUS EVERY 24 HOURS
Status: DISCONTINUED | OUTPATIENT
Start: 2022-10-11 | End: 2022-10-14 | Stop reason: HOSPADM

## 2022-10-11 RX ORDER — MORPHINE SULFATE 4 MG/ML
4 INJECTION, SOLUTION INTRAMUSCULAR; INTRAVENOUS EVERY 4 HOURS PRN
Status: DISCONTINUED | OUTPATIENT
Start: 2022-10-11 | End: 2022-10-12

## 2022-10-11 RX ORDER — ONDANSETRON 2 MG/ML
4 INJECTION INTRAMUSCULAR; INTRAVENOUS EVERY 8 HOURS PRN
Status: DISCONTINUED | OUTPATIENT
Start: 2022-10-11 | End: 2022-10-14 | Stop reason: HOSPADM

## 2022-10-11 RX ADMIN — SODIUM CHLORIDE 500 ML: 0.9 INJECTION, SOLUTION INTRAVENOUS at 08:10

## 2022-10-11 RX ADMIN — MORPHINE SULFATE 4 MG: 4 INJECTION, SOLUTION INTRAMUSCULAR; INTRAVENOUS at 11:10

## 2022-10-11 RX ADMIN — ENOXAPARIN SODIUM 40 MG: 100 INJECTION SUBCUTANEOUS at 11:10

## 2022-10-11 RX ADMIN — CEFTRIAXONE SODIUM 1 G: 1 INJECTION, POWDER, FOR SOLUTION INTRAMUSCULAR; INTRAVENOUS at 08:10

## 2022-10-11 RX ADMIN — KETOROLAC TROMETHAMINE 10 MG: 30 INJECTION, SOLUTION INTRAMUSCULAR; INTRAVENOUS at 07:10

## 2022-10-11 NOTE — FIRST PROVIDER EVALUATION
"Medical screening examination initiated.  I have conducted a focused provider triage encounter, findings are as follows:    Brief history of present illness: flank pain     Vitals:    10/11/22 1710   BP: (!) 161/96   BP Location: Left arm   Patient Position: Sitting   Pulse: 72   Resp: 18   Temp: 98.4 °F (36.9 °C)   TempSrc: Oral   SpO2: 99%   Weight: 86.2 kg (190 lb)   Height: 5' 6" (1.676 m)       Pertinent physical exam:  Diagnosed yesterday with a kidney stone at [facility] reports unable to urinate bladder scan is 50 cc requesting toradol but creatinine 1.7 from yesterday will repeat labs and ct     Brief workup plan: labs ct     Preliminary workup initiated; this workup will be continued and followed by the physician or advanced practice provider that is assigned to the patient when roomed.  "

## 2022-10-12 LAB
ANION GAP SERPL CALC-SCNC: 9 MMOL/L (ref 8–16)
ANION GAP SERPL CALC-SCNC: 9 MMOL/L (ref 8–16)
BASOPHILS # BLD AUTO: 0.03 K/UL (ref 0–0.2)
BASOPHILS # BLD AUTO: 0.03 K/UL (ref 0–0.2)
BASOPHILS NFR BLD: 0.2 % (ref 0–1.9)
BASOPHILS NFR BLD: 0.2 % (ref 0–1.9)
BUN SERPL-MCNC: 19 MG/DL (ref 6–20)
BUN SERPL-MCNC: 19 MG/DL (ref 6–20)
CALCIUM SERPL-MCNC: 9.2 MG/DL (ref 8.7–10.5)
CALCIUM SERPL-MCNC: 9.2 MG/DL (ref 8.7–10.5)
CHLORIDE SERPL-SCNC: 106 MMOL/L (ref 95–110)
CHLORIDE SERPL-SCNC: 106 MMOL/L (ref 95–110)
CO2 SERPL-SCNC: 23 MMOL/L (ref 23–29)
CO2 SERPL-SCNC: 23 MMOL/L (ref 23–29)
CREAT SERPL-MCNC: 1.6 MG/DL (ref 0.5–1.4)
CREAT SERPL-MCNC: 1.6 MG/DL (ref 0.5–1.4)
DIFFERENTIAL METHOD: ABNORMAL
DIFFERENTIAL METHOD: ABNORMAL
EOSINOPHIL # BLD AUTO: 0 K/UL (ref 0–0.5)
EOSINOPHIL # BLD AUTO: 0 K/UL (ref 0–0.5)
EOSINOPHIL NFR BLD: 0.2 % (ref 0–8)
EOSINOPHIL NFR BLD: 0.2 % (ref 0–8)
ERYTHROCYTE [DISTWIDTH] IN BLOOD BY AUTOMATED COUNT: 12.1 % (ref 11.5–14.5)
ERYTHROCYTE [DISTWIDTH] IN BLOOD BY AUTOMATED COUNT: 12.1 % (ref 11.5–14.5)
EST. GFR  (NO RACE VARIABLE): 53.8 ML/MIN/1.73 M^2
EST. GFR  (NO RACE VARIABLE): 53.8 ML/MIN/1.73 M^2
GLUCOSE SERPL-MCNC: 102 MG/DL (ref 70–110)
GLUCOSE SERPL-MCNC: 102 MG/DL (ref 70–110)
HCT VFR BLD AUTO: 42.3 % (ref 40–54)
HCT VFR BLD AUTO: 42.3 % (ref 40–54)
HGB BLD-MCNC: 13.9 G/DL (ref 14–18)
HGB BLD-MCNC: 13.9 G/DL (ref 14–18)
IMM GRANULOCYTES # BLD AUTO: 0.09 K/UL (ref 0–0.04)
IMM GRANULOCYTES # BLD AUTO: 0.09 K/UL (ref 0–0.04)
IMM GRANULOCYTES NFR BLD AUTO: 0.6 % (ref 0–0.5)
IMM GRANULOCYTES NFR BLD AUTO: 0.6 % (ref 0–0.5)
LYMPHOCYTES # BLD AUTO: 1.9 K/UL (ref 1–4.8)
LYMPHOCYTES # BLD AUTO: 1.9 K/UL (ref 1–4.8)
LYMPHOCYTES NFR BLD: 13.4 % (ref 18–48)
LYMPHOCYTES NFR BLD: 13.4 % (ref 18–48)
MCH RBC QN AUTO: 30.2 PG (ref 27–31)
MCH RBC QN AUTO: 30.2 PG (ref 27–31)
MCHC RBC AUTO-ENTMCNC: 32.9 G/DL (ref 32–36)
MCHC RBC AUTO-ENTMCNC: 32.9 G/DL (ref 32–36)
MCV RBC AUTO: 92 FL (ref 82–98)
MCV RBC AUTO: 92 FL (ref 82–98)
MONOCYTES # BLD AUTO: 1.6 K/UL (ref 0.3–1)
MONOCYTES # BLD AUTO: 1.6 K/UL (ref 0.3–1)
MONOCYTES NFR BLD: 11.4 % (ref 4–15)
MONOCYTES NFR BLD: 11.4 % (ref 4–15)
NEUTROPHILS # BLD AUTO: 10.4 K/UL (ref 1.8–7.7)
NEUTROPHILS # BLD AUTO: 10.4 K/UL (ref 1.8–7.7)
NEUTROPHILS NFR BLD: 74.2 % (ref 38–73)
NEUTROPHILS NFR BLD: 74.2 % (ref 38–73)
NRBC BLD-RTO: 0 /100 WBC
NRBC BLD-RTO: 0 /100 WBC
PLATELET # BLD AUTO: 231 K/UL (ref 150–450)
PLATELET # BLD AUTO: 231 K/UL (ref 150–450)
PMV BLD AUTO: 10.4 FL (ref 9.2–12.9)
PMV BLD AUTO: 10.4 FL (ref 9.2–12.9)
POTASSIUM SERPL-SCNC: 4 MMOL/L (ref 3.5–5.1)
POTASSIUM SERPL-SCNC: 4 MMOL/L (ref 3.5–5.1)
RBC # BLD AUTO: 4.6 M/UL (ref 4.6–6.2)
RBC # BLD AUTO: 4.6 M/UL (ref 4.6–6.2)
SODIUM SERPL-SCNC: 138 MMOL/L (ref 136–145)
SODIUM SERPL-SCNC: 138 MMOL/L (ref 136–145)
WBC # BLD AUTO: 14.06 K/UL (ref 3.9–12.7)
WBC # BLD AUTO: 14.06 K/UL (ref 3.9–12.7)

## 2022-10-12 PROCEDURE — 63600175 PHARM REV CODE 636 W HCPCS: Performed by: STUDENT IN AN ORGANIZED HEALTH CARE EDUCATION/TRAINING PROGRAM

## 2022-10-12 PROCEDURE — 80048 BASIC METABOLIC PNL TOTAL CA: CPT | Performed by: INTERNAL MEDICINE

## 2022-10-12 PROCEDURE — 12000002 HC ACUTE/MED SURGE SEMI-PRIVATE ROOM

## 2022-10-12 PROCEDURE — 25000003 PHARM REV CODE 250: Performed by: STUDENT IN AN ORGANIZED HEALTH CARE EDUCATION/TRAINING PROGRAM

## 2022-10-12 PROCEDURE — 25000003 PHARM REV CODE 250: Performed by: INTERNAL MEDICINE

## 2022-10-12 PROCEDURE — 85025 COMPLETE CBC W/AUTO DIFF WBC: CPT | Performed by: INTERNAL MEDICINE

## 2022-10-12 PROCEDURE — 63600175 PHARM REV CODE 636 W HCPCS: Performed by: INTERNAL MEDICINE

## 2022-10-12 PROCEDURE — 36415 COLL VENOUS BLD VENIPUNCTURE: CPT | Performed by: INTERNAL MEDICINE

## 2022-10-12 RX ORDER — HYDROMORPHONE HYDROCHLORIDE 1 MG/ML
1 INJECTION, SOLUTION INTRAMUSCULAR; INTRAVENOUS; SUBCUTANEOUS EVERY 4 HOURS PRN
Status: DISCONTINUED | OUTPATIENT
Start: 2022-10-12 | End: 2022-10-14 | Stop reason: HOSPADM

## 2022-10-12 RX ORDER — HYDROCODONE BITARTRATE AND ACETAMINOPHEN 5; 325 MG/1; MG/1
1 TABLET ORAL EVERY 6 HOURS PRN
Status: DISCONTINUED | OUTPATIENT
Start: 2022-10-12 | End: 2022-10-13

## 2022-10-12 RX ADMIN — HYDROMORPHONE HYDROCHLORIDE 1 MG: 1 INJECTION, SOLUTION INTRAMUSCULAR; INTRAVENOUS; SUBCUTANEOUS at 11:10

## 2022-10-12 RX ADMIN — SODIUM CHLORIDE, SODIUM LACTATE, POTASSIUM CHLORIDE, AND CALCIUM CHLORIDE: .6; .31; .03; .02 INJECTION, SOLUTION INTRAVENOUS at 10:10

## 2022-10-12 RX ADMIN — TAMSULOSIN HYDROCHLORIDE 0.4 MG: 0.4 CAPSULE ORAL at 09:10

## 2022-10-12 RX ADMIN — ACETAMINOPHEN 650 MG: 325 TABLET ORAL at 07:10

## 2022-10-12 RX ADMIN — SODIUM CHLORIDE, SODIUM LACTATE, POTASSIUM CHLORIDE, AND CALCIUM CHLORIDE: .6; .31; .03; .02 INJECTION, SOLUTION INTRAVENOUS at 01:10

## 2022-10-12 RX ADMIN — HYDROCODONE BITARTRATE AND ACETAMINOPHEN 1 TABLET: 5; 325 TABLET ORAL at 10:10

## 2022-10-12 RX ADMIN — MORPHINE SULFATE 2 MG: 2 INJECTION, SOLUTION INTRAMUSCULAR; INTRAVENOUS at 08:10

## 2022-10-12 RX ADMIN — CEFTRIAXONE SODIUM 1 G: 1 INJECTION, POWDER, FOR SOLUTION INTRAMUSCULAR; INTRAVENOUS at 10:10

## 2022-10-12 NOTE — ASSESSMENT & PLAN NOTE
Patient with acute kidney injury likely due to post-obstructive d/t renal lithiasis and ACE KITA is currently worsening. Labs reviewed- Renal function/electrolytes with Estimated Creatinine Clearance: 48 mL/min (A) (based on SCr of 2 mg/dL (H)). according to latest data. Monitor urine output and serial BMP and adjust therapy as needed. Avoid nephrotoxins and renally dose meds for GFR listed above.     Inpatient admission for renal lithiasis, complicated by acute kidney injury (worsened by NSAID and ACE) with hydronephrosis, and early pyelonephritis; will continue ceftriaxone, hydration and Urology consultation for stenting in AM; hold lisinopril because of worsening renal function and use prn hydralazine for BP; discussed lisinopril and worsening renal function with patient, and that he will need outpatient follow-up with his PCP to monitor use of ACE and renal function. Patient expressed understanding. AM labs for review.

## 2022-10-12 NOTE — ED NOTES
"Resting quietly / no distress noted / skin w/d to touch / vss / gcs 15 / pt c/o "kidney stone on right side / right side pain / [place] night" no fever / no n/v at this time / awaiting orders at this time   "

## 2022-10-12 NOTE — ED PROVIDER NOTES
Encounter Date: 10/11/2022       History     Chief Complaint   Patient presents with    Abdominal Pain     Pt has abdominal pain and lower back pain, pt was dx with kidney stones yesterday. Pt sent home with prescription flomax, since taking pt states he is unable to urinate, just drops. Feels as though he is going to erupt.      HPI    SEEN AND EVALUATED PRESENTED WITH CHIEF COMPLAINT OF FLANK PAIN.  PATIENT WAS DIAGNOSED WITH A RENAL STONE YESTERDAY AT AN OUTSIDE HOSPITAL.  HERE TODAY HE NOTES WORSENING FLANK PAIN.  HE STATES HE HAD RELIEF LAST NIGHT FROM TORADOL.  THIS IS AN ACUTE EPISODIC PROCESS HE DENIES PREVIOUS HISTORY OF RENAL STONES.  HE DENIES ASSOCIATED FEVER.  SYMPTOMS moderate to severe.  No additional complaints.    Review of patient's allergies indicates:   Allergen Reactions    Anectine [succinylcholine chloride] Anaphylaxis     Stopped breathing     Anectine dalton-pack      Past Medical History:   Diagnosis Date    Anxiety     Hypertension     not on meds     Past Surgical History:   Procedure Laterality Date    REPAIR OF TORSION OF TESTICLE Right     age 15    VASECTOMY       Family History   Problem Relation Age of Onset    Hypertension Unknown     No Known Problems Mother     Hypertension Father     Arthritis Father     Nephrolithiasis Father     Stroke Maternal Grandmother     No Known Problems Maternal Grandfather     Stroke Paternal Grandmother     No Known Problems Paternal Grandfather     Hodgkin's lymphoma Son         age 17    Diabetes Neg Hx      Social History     Tobacco Use    Smoking status: Never    Smokeless tobacco: Never   Substance Use Topics    Alcohol use: Yes     Comment: seldom    Drug use: No     Review of Systems   Constitutional:  Negative for fever.   HENT:  Negative for sore throat.    Respiratory:  Negative for shortness of breath.    Cardiovascular:  Negative for chest pain.   Gastrointestinal:  Negative for nausea.   Genitourinary:  Positive for flank pain. Negative  for dysuria.   Musculoskeletal:  Negative for back pain.   Skin:  Negative for rash.   Neurological:  Negative for weakness.   All other systems reviewed and are negative.    Physical Exam     Initial Vitals [10/11/22 1710]   BP Pulse Resp Temp SpO2   (!) 161/96 72 18 98.4 °F (36.9 °C) 99 %      MAP       --         Physical Exam    Nursing note and vitals reviewed.  Constitutional: He appears well-developed and well-nourished.   HENT:   Head: Normocephalic and atraumatic.   Eyes: Conjunctivae are normal.   Cardiovascular:  Normal rate and regular rhythm.           Abdominal: Abdomen is soft.   Musculoskeletal:         General: Normal range of motion.     Neurological: He is alert and oriented to person, place, and time.   Skin: Skin is warm and dry.   Psychiatric: He has a normal mood and affect. His speech is normal.       ED Course   Procedures  Labs Reviewed   CBC W/ AUTO DIFFERENTIAL - Abnormal; Notable for the following components:       Result Value    WBC 16.77 (*)     Gran # (ANC) 13.6 (*)     Immature Grans (Abs) 0.08 (*)     Mono # 1.4 (*)     Gran % 81.2 (*)     Lymph % 9.4 (*)     All other components within normal limits   COMPREHENSIVE METABOLIC PANEL - Abnormal; Notable for the following components:    Sodium 130 (*)     Creatinine 2.0 (*)     Anion Gap 6 (*)     eGFR 41.2 (*)     All other components within normal limits   URINALYSIS, REFLEX TO URINE CULTURE    Narrative:     Specimen Source->Urine          Imaging Results              CT Renal Stone Study ABD Pelvis WO (Final result)  Result time 10/11/22 18:26:38      Final result by Babar Treviño MD (10/11/22 18:26:38)                   Narrative:    CT ABDOMEN AND PELVIS WITHOUT CONTRAST (UROLITHIASIS PROTOCOL)    CLINICAL DATA: Nephrolithiasis    CMS MANDATED QUALITY DATA - CT RADIATION  436    All CT scans at this facility utilize dose modulation, iterative reconstruction, and/or weight based dosing when appropriate to reduce radiation  dose to as low as reasonably achievable.    Findings: Noninfusion images were obtained from the lung bases to the pubic symphysis.    Comparison is made to a study from one day prior.    Findings:    There is mild linear scarring or atelectasis at the left lung base.    The liver is unremarkable. Partially contracted gallbladder appears within normal limits. The spleen is normal in size. The pancreas and adrenal glands are within normal limits. The left kidney is normal. There is mild right-sided hydronephrosis, increased compared to the prior study, with increasing right-sided perinephric edema. 3 mm distal right ureteral calculus was previously just proximal to the ureterovesicular junction, and is now directly at the ureterovesicular junction.    The aorta is normal in caliber. Bowel structures are within normal limits. There is no free air or free fluid.    Images of the pelvis demonstrate a normal-appearing urinary bladder and bowel structures. Small fat-containing right inguinal hernia is noted.    IMPRESSION:  1. Distal ureteral calculus demonstrated on October 10 is now positioned at the ureterovesicular junction. Mild hydronephrosis is noted, increased, with increasing right-sided perinephric edema  2. No other significant changes.  3. Small fat-containing right inguinal hernia.    Electronically signed by:  Babar Treviño MD  10/11/2022 6:26 PM CDT Workstation: [id number]                                     Medications   cefTRIAXone (ROCEPHIN) 1 g/50 mL D5W IVPB (0 g Intravenous Stopped 10/12/22 1031)   busPIRone tablet 5 mg (5 mg Oral Not Given 10/12/22 0900)   tamsulosin 24 hr capsule 0.4 mg (0.4 mg Oral Given 10/12/22 0912)   hydrALAZINE injection 10 mg (has no administration in time range)   melatonin tablet 6 mg (has no administration in time range)   enoxaparin injection 40 mg (40 mg Subcutaneous Given 10/11/22 2306)   acetaminophen tablet 650 mg (has no administration in time range)    ondansetron injection 4 mg (has no administration in time range)   lactated ringers infusion ( Intravenous New Bag 10/12/22 0105)   HYDROmorphone injection 1 mg (1 mg Intravenous Given 10/12/22 1125)   ketorolac injection 9.999 mg (9.999 mg Intravenous Given 10/11/22 1927)   sodium chloride 0.9% bolus 500 mL (0 mLs Intravenous Stopped 10/11/22 2137)     Medical Decision Making:   Initial Assessment:   Seen and evaluated presents with a chief complaint of flank pain.  Patient had elevated creatinine yesterday.  Creatinine is worsening today.  He has known renal stone disease.  Urology consulted and agree with admission.  They will see him in the morning.  Hemodynamically stable.  Will have trial of passage while on Flomax tonight.                        Clinical Impression:   Final diagnoses:  [N13.2] Hydronephrosis with obstructing calculus      ED Disposition Condition    Admit Stable                Semaj Blackman Jr., MD  10/12/22 3415

## 2022-10-12 NOTE — H&P
"[facility] - Emergency Dept  Hospital Medicine  History & Physical    Patient Name: Jem Alvarez  MRN: [id number]  Patient Class: IP- Inpatient  Admission Date: 10/11/2022  Attending Physician: Cristofer Buchanan MD  Primary Care Provider: Raul Echeverria Jr, MD         Patient information was obtained from patient, spouse/SO, past medical records, ER records and ED MD.     Subjective:     Principal Problem:Renal lithiasis    Chief Complaint:   Chief Complaint   Patient presents with    Abdominal Pain     Pt has abdominal pain and lower back pain, pt was dx with kidney stones yesterday. Pt sent home with prescription flomax, since taking pt states he is unable to urinate, just drops. Feels as though he is going to erupt.         HPI: 47 year old male with history of HTN, and Renal Lithiasis presented to ED complaining of right sided flank pain X 2 days with radiations towards groin/scrotum. He was seen at [facility] last PM where renal lithiasis was diagnosed. He was discharged on ibuprofen and tamsulosin with outpatient follow-up. His discomfort worsened: the pain became sharp and stabbing 8-9/10 with the above rads, constant, worsening with urination. No hematuria, but he has had dysuria and polyuria.    In ED: labs reviewed and noted below: Leukocytosis with normal H/H; mild hyponatremia with worsening stage 3b renal dysfunction (August GFR/Cr = >60/1.0; yesterday = 50/1.7; today = 41.2/2.0). UA: bland. CT reviewed: "Distal ureteral calculus demonstrated on October 10 is now positioned at the ureterovesicular junction. Mild hydronephrosis is noted, increased, with increasing right-sided perinephric edema..."     Discussed with ED MD, who discussed with Urology on call: Inpatient admission for renal lithiasis, complicated by acute kidney injury (worsened by NSAID and ACE) with hydronephrosis, and early pyelonephritis; will continue ceftriaxone, hydration and Urology consultation for stenting in AM; " hold lisinopril because of worsening renal function and use prn hydralazine for BP; discussed lisinopril and worsening renal function with patient, and that he will need outpatient follow-up with his PCP to monitor use of ACE and renal function. Patient expressed understanding. AM labs for review.      Past Medical History:   Diagnosis Date    Anxiety     Hypertension     not on meds       Past Surgical History:   Procedure Laterality Date    REPAIR OF TORSION OF TESTICLE Right     age 15    VASECTOMY         Review of patient's allergies indicates:  No Known Allergies    No current facility-administered medications on file prior to encounter.     Current Outpatient Medications on File Prior to Encounter   Medication Sig    busPIRone (BUSPAR) 5 MG Tab Take 1 tablet (5 mg total) by mouth 2 (two) times daily.    ibuprofen (ADVIL,MOTRIN) 600 MG tablet Take 1 tablet (600 mg total) by mouth every 6 (six) hours as needed for Pain.    lisinopriL 10 MG tablet Take 1 tablet (10 mg total) by mouth once daily.    ondansetron (ZOFRAN) 4 MG tablet Take 1 tablet (4 mg total) by mouth every 6 (six) hours.    tamsulosin (FLOMAX) 0.4 mg Cap Take 1 capsule (0.4 mg total) by mouth once daily.     Family History       Problem Relation (Age of Onset)    Arthritis Father    Hodgkin's lymphoma Son    Hypertension Father    Nephrolithiasis Father    No Known Problems Mother, Maternal Grandfather, Paternal Grandfather    Stroke Maternal Grandmother, Paternal Grandmother          Tobacco Use    Smoking status: Never    Smokeless tobacco: Never   Substance and Sexual Activity    Alcohol use: Yes     Comment: seldom    Drug use: No    Sexual activity: Yes     Review of Systems   Constitutional: Negative.    HENT: Negative.     Eyes: Negative.    Respiratory: Negative.     Cardiovascular: Negative.    Gastrointestinal: Negative.    Endocrine: Negative.    Genitourinary:  Positive for dysuria and flank pain.   Skin: Negative.     Allergic/Immunologic: Negative.    Neurological: Negative.    Hematological: Negative.    All other systems reviewed and are negative.  Objective:     Vital Signs (Most Recent):  Temp: 98.1 °F (36.7 °C) (10/11/22 1916)  Pulse: 70 (10/11/22 1916)  Resp: 14 (10/11/22 1916)  BP: (!) 151/88 (10/11/22 1916)  SpO2: 98 % (10/11/22 1916)   Vital Signs (24h Range):  Temp:  [98.1 °F (36.7 °C)-98.4 °F (36.9 °C)] 98.1 °F (36.7 °C)  Pulse:  [70-72] 70  Resp:  [14-18] 14  SpO2:  [98 %-99 %] 98 %  BP: (151-161)/(88-96) 151/88     Weight: 86.2 kg (190 lb)  Body mass index is 30.67 kg/m².    Physical Exam  Vitals and nursing note reviewed.   Constitutional:       Appearance: He is well-developed.   HENT:      Head: Normocephalic and atraumatic.      Right Ear: External ear normal.      Left Ear: External ear normal.      Nose: Nose normal.   Eyes:      Conjunctiva/sclera: Conjunctivae normal.      Pupils: Pupils are equal, round, and reactive to light.   Cardiovascular:      Rate and Rhythm: Normal rate and regular rhythm.      Heart sounds: Normal heart sounds.   Pulmonary:      Effort: Pulmonary effort is normal.      Breath sounds: Normal breath sounds.   Abdominal:      General: Bowel sounds are normal.      Palpations: Abdomen is soft.   Genitourinary:     Comments: Right sided flank discomfort to ballottement and percussion  Musculoskeletal:         General: Normal range of motion.      Cervical back: Normal range of motion and neck supple.   Skin:     General: Skin is warm and dry.      Capillary Refill: Capillary refill takes less than 2 seconds.   Neurological:      Mental Status: He is alert and oriented to person, place, and time.   Psychiatric:         Behavior: Behavior normal.         Thought Content: Thought content normal.         Judgment: Judgment normal.         CRANIAL NERVES     CN III, IV, VI   Pupils are equal, round, and reactive to light.     Significant Labs: All pertinent labs within the past 24 hours  have been reviewed.  CBC:   Recent Labs   Lab 10/10/22  1445 10/11/22  1753   WBC 14.21* 16.77*   HGB 15.6 15.3   HCT 47.6 46.9    283     CMP:   Recent Labs   Lab 10/10/22  1445 10/11/22  1753    130*   K 4.2 4.1    100   CO2 28 24   * 102   BUN 16 19   CREATININE 1.70* 2.0*   CALCIUM 9.3 9.3   PROT 7.2 7.4   ALBUMIN 4.5 4.6   BILITOT 0.6 0.9   ALKPHOS 74 62   AST 26 15   ALT 39 28   ANIONGAP 12 6*       Significant Imaging: I have reviewed all pertinent imaging results/findings within the past 24 hours.    Assessment/Plan:     * Renal lithiasis  Inpatient admission for renal lithiasis, complicated by acute kidney injury (worsened by NSAID and ACE) with hydronephrosis, and early pyelonephritis; will continue ceftriaxone, hydration and Urology consultation for stenting in AM; hold lisinopril because of worsening renal function and use prn hydralazine for BP; discussed lisinopril and worsening renal function with patient, and that he will need outpatient follow-up with his PCP to monitor use of ACE and renal function. Patient expressed understanding. AM labs for review.      KITA (acute kidney injury)  Patient with acute kidney injury likely due to post-obstructive d/t renal lithiasis and ACE KITA is currently worsening. Labs reviewed- Renal function/electrolytes with Estimated Creatinine Clearance: 48 mL/min (A) (based on SCr of 2 mg/dL (H)). according to latest data. Monitor urine output and serial BMP and adjust therapy as needed. Avoid nephrotoxins and renally dose meds for GFR listed above.     Inpatient admission for renal lithiasis, complicated by acute kidney injury (worsened by NSAID and ACE) with hydronephrosis, and early pyelonephritis; will continue ceftriaxone, hydration and Urology consultation for stenting in AM; hold lisinopril because of worsening renal function and use prn hydralazine for BP; discussed lisinopril and worsening renal function with patient, and that he will  need outpatient follow-up with his PCP to monitor use of ACE and renal function. Patient expressed understanding. AM labs for review.    Primary hypertension   Inpatient admission for renal lithiasis, complicated by acute kidney injury (worsened by NSAID and ACE) with hydronephrosis, and early pyelonephritis; will continue ceftriaxone, hydration and Urology consultation for stenting in AM; hold lisinopril because of worsening renal function and use prn hydralazine for BP; discussed lisinopril and worsening renal function with patient, and that he will need outpatient follow-up with his PCP to monitor use of ACE and renal function. Patient expressed understanding. AM labs for review.    Pyelonephritis  Inpatient admission for renal lithiasis, complicated by acute kidney injury (worsened by NSAID and ACE) with hydronephrosis, and early pyelonephritis; will continue ceftriaxone, hydration and Urology consultation for stenting in AM; hold lisinopril because of worsening renal function and use prn hydralazine for BP; discussed lisinopril and worsening renal function with patient, and that he will need outpatient follow-up with his PCP to monitor use of ACE and renal function. Patient expressed understanding. AM labs for review.      Hydronephrosis with urinary obstruction due to renal calculus  Inpatient admission for renal lithiasis, complicated by acute kidney injury (worsened by NSAID and ACE) with hydronephrosis, and early pyelonephritis; will continue ceftriaxone, hydration and Urology consultation for stenting in AM; hold lisinopril because of worsening renal function and use prn hydralazine for BP; discussed lisinopril and worsening renal function with patient, and that he will need outpatient follow-up with his PCP to monitor use of ACE and renal function. Patient expressed understanding. AM labs for review.        VTE Risk Mitigation (From admission, onward)    None             Cristofer Buchanan MD  Department of  [facility] Medicine   [facility] - Emergency Dept

## 2022-10-12 NOTE — CARE UPDATE
Pt presented to the ER yesterday at [facility] with right flank pain.  UA with 3+ blood.  White blood cell count 14.  Creatinine 10.7.  He only had mild hydro and so he was given a trial of passage with Flomax.  He also received Toradol in the ER.        He returns today to [facility] ER with worsening right flank pain difficulty urinating.  Creatinine up to 2.0.  White count up to 16.  Urinalysis completely negative.  CT now shows perinephric stranding with some possible forniceal extravasation.  The stone however is right at the ureterovesical junction and about to pass into the bladder.  Biggest complaint is difficulty urinating.  Bladder appears empty.          Plan:  Stone is right in an about to pass into the bladder.  On now that he is partially decompressed from possible forniceal extravasation may have a higher chance of passing stone into the bladder  Place in observation on medicine overnight   Would avoid Toradol for now with creatinine 2.0  Can take Flomax tonight to see if it can help him pass stone  Strain all urine  NPO after midnight for possible procedure tomorrow   Cbc and bmp in am  Broad spectrum antibiotics. Should be discharged on antibiotics too due to risk of urinoma   Dr. Golden is on-call tomorrow and I will see if he can evaluate him tomorrow and decide if he needs a stone extraction versus stent verses monitoring if he passes the stone  Will also ask nursing staff to Please also call  tomorrow

## 2022-10-12 NOTE — ASSESSMENT & PLAN NOTE
Inpatient admission for renal lithiasis, complicated by acute kidney injury (worsened by NSAID and ACE) with hydronephrosis, and early pyelonephritis; will continue ceftriaxone, hydration and Urology consultation for stenting in AM; hold lisinopril because of worsening renal function and use prn hydralazine for BP; discussed lisinopril and worsening renal function with patient, and that he will need outpatient follow-up with his PCP to monitor use of ACE and renal function. Patient expressed understanding. AM labs for review.

## 2022-10-12 NOTE — PROGRESS NOTES
"[facility] Medicine    Progress Note    Patient Name: eJm Alvarez  MRN: [id number]  Patient Class: IP- Inpatient   Admission Date: 10/11/2022  5:03 PM  Length of Stay: 1  Attending Physician: Zhang Guo MD  Primary Care Provider: Raul Echeverria Jr, MD  Face-to-Face encounter date: 10/12/2022  Code status:  Chief Complaint: Abdominal Pain (Pt has abdominal pain and lower back pain, pt was dx with kidney stones yesterday. Pt sent home with prescription flomax, since taking pt states he is unable to urinate, just drops. Feels as though he is going to erupt. )        Subjective:    HPI:47 year old male with history of HTN, and Renal Lithiasis presented to ED complaining of right sided flank pain X 2 days with radiations towards groin/scrotum. He was seen at [facility] last PM where renal lithiasis was diagnosed. He was discharged on ibuprofen and tamsulosin with outpatient follow-up. His discomfort worsened: the pain became sharp and stabbing 8-9/10 with the above rads, constant, worsening with urination. No hematuria, but he has had dysuria and polyuria.  In ED: labs reviewed and noted below: Leukocytosis with normal H/H; mild hyponatremia with worsening stage 3b renal dysfunction (August GFR/Cr = >60/1.0; yesterday = 50/1.7; today = 41.2/2.0). UA: bland. CT reviewed: "Distal ureteral calculus demonstrated on October 10 is now positioned at the ureterovesicular junction. Mild hydronephrosis is noted, increased, with increasing right-sided perinephric edema..."   Discussed with ED MD, who discussed with Urology on call: Inpatient admission for renal lithiasis, complicated by acute kidney injury (worsened by NSAID and ACE) with hydronephrosis, and early pyelonephritis; will continue ceftriaxone, hydration and Urology consultation for stenting in AM; hold lisinopril because of worsening renal function and use prn hydralazine for BP; discussed lisinopril and worsening renal function with " patient, and that he will need outpatient follow-up with his PCP to monitor use of ACE and renal function. Patient expressed understanding. AM labs for review.       Interval History:   10/12: Patient seen alongside Dr Hutton and plan is to take him for cystoscopic intervention today. No concerns/issues overnight reported by the patient or the nursing staff.    Review of Systems All other Review of Systems were found to be negative expect for that mentioned already in HPI.     Objective:     Vitals:    10/12/22 0723 10/12/22 0815 10/12/22 1115 10/12/22 1125   BP: 122/79  128/80    BP Location:       Patient Position:       Pulse: 80  70    Resp: 20 16 18 16   Temp: 99 °F (37.2 °C)  99.9 °F (37.7 °C)    TempSrc:       SpO2: 99%  (!) 94%    Weight:       Height:            Vitals reviewed.  Constitutional: No distress.   HENT: NC  Head: Atraumatic.   Cardiovascular: Normal rate, regular rhythm and normal heart sounds.   Pulmonary/Chest: Effort normal. No wheezes.   Abdominal: Soft. Bowel sounds are normal. No distension and no mass. No tenderness  Neurological: Alert.   Skin: Skin is warm and dry.   Psych: Appropriate mood and affect    Following labs were Reviewed   CBC:  Recent Labs   Lab 10/12/22  0435   WBC 14.06*  14.06*   HGB 13.9*  13.9*   HCT 42.3  42.3     231     CMP:  Recent Labs   Lab 10/11/22  1753 10/12/22  0435   CALCIUM 9.3 9.2  9.2   ALBUMIN 4.6  --    PROT 7.4  --    * 138  138   K 4.1 4.0  4.0   CO2 24 23  23    106  106   BUN 19 19  19   CREATININE 2.0* 1.6*  1.6*   ALKPHOS 62  --    ALT 28  --    AST 15  --    BILITOT 0.9  --        Micro Results  Microbiology Results (last 7 days)       ** No results found for the last 168 hours. **             Radiology Reports  CT Renal Stone Study ABD Pelvis WO    Result Date: 10/11/2022  CT ABDOMEN AND PELVIS WITHOUT CONTRAST (UROLITHIASIS PROTOCOL) CLINICAL DATA: Nephrolithiasis CMS MANDATED QUALITY DATA - CT RADIATION  436  All CT scans at this facility utilize dose modulation, iterative reconstruction, and/or weight based dosing when appropriate to reduce radiation dose to as low as reasonably achievable. Findings: Noninfusion images were obtained from the lung bases to the pubic symphysis. Comparison is made to a study from one day prior. Findings: There is mild linear scarring or atelectasis at the left lung base. The liver is unremarkable. Partially contracted gallbladder appears within normal limits. The spleen is normal in size. The pancreas and adrenal glands are within normal limits. The left kidney is normal. There is mild right-sided hydronephrosis, increased compared to the prior study, with increasing right-sided perinephric edema. 3 mm distal right ureteral calculus was previously just proximal to the ureterovesicular junction, and is now directly at the ureterovesicular junction. The aorta is normal in caliber. Bowel structures are within normal limits. There is no free air or free fluid. Images of the pelvis demonstrate a normal-appearing urinary bladder and bowel structures. Small fat-containing right inguinal hernia is noted. IMPRESSION: 1. Distal ureteral calculus demonstrated on October 10 is now positioned at the ureterovesicular junction. Mild hydronephrosis is noted, increased, with increasing right-sided perinephric edema 2. No other significant changes. 3. Small fat-containing right inguinal hernia. Electronically signed by:  Babar Treviño MD  10/11/2022 6:26 PM CDT Workstation: [id number]    CT Renal Stone Study ABD Pelvis WO    Result Date: 10/10/2022  EXAMINATION: CT RENAL STONE STUDY ABD PELVIS WO CLINICAL HISTORY: Flank pain, kidney stone suspected; TECHNIQUE: Low dose axial images, sagittal and coronal reformations were obtained from the lung bases to the pubic symphysis.  Contrast was not administered.  Lack of IV contrast limits evaluation of solid intra-abdominal organs. Automated exposure control  was utilized. Total  COMPARISON: None FINDINGS: There is a 5 mm stone in the distal right ureter, approximately 6 mm proximal to the right UVJ.  There is mild right hydroureteronephrosis.  There is mild right perinephric fat stranding.  No additional renal stones are identified.  The urinary bladder is partially distended, with normal appearance. Noncontrast evaluation of the liver, gallbladder, pancreas, spleen and adrenal glands, with no abnormality identified.  Noncontrast distended bowel is nonobstructive.  The appendix is normal.  No enlarged retroperitoneal or pelvic lymph nodes are identified.  There is no free fluid in the abdomen or pelvis.  The prostate is normal in size.  There are some prostate calcifications noted.  There is a small fat containing right inguinal hernia.  No acute bone abnormality is identified.  There is mild spondylosis of the lumbar spine.  Images of the lung bases demonstrate minimal subsegmental dependent atelectasis.     1. Right ureterolithiasis with a 5 mm stone in the distal right ureter, approximately 6 mm proximal to the right UVJ.  There is mild right hydroureteronephrosis and mild right perinephric fat stranding. Electronically signed by: Shahla Aaron MD Date:    10/10/2022 Time:    15:13       Meds  Scheduled Meds:   busPIRone  5 mg Oral BID    cefTRIAXone (ROCEPHIN) IVPB  1 g Intravenous Q12H    enoxaparin  40 mg Subcutaneous Daily    tamsulosin  0.4 mg Oral Daily     Continuous Infusions:   lactated ringers 100 mL/hr at 10/12/22 0105     PRN Meds:.acetaminophen, hydrALAZINE, HYDROmorphone, melatonin, ondansetron.    Assessment & Plan:     KITA (acute kidney injury)  Improving  Continue IVF     Primary hypertension  controlled     Pyelonephritis  Follow up on cultures  Continue IVF        Hydronephrosis with urinary obstruction due to renal calculus  Urology on board  For cystoscopy today        Discharge Planning:   Is the patient medically ready for  discharge?: no    Reason for patient still in hospital (select all that apply): Patient trending condition and Treatment    Above encounter included review of the medical records, interviewing and examining the patient face-to-face, discussion with family and other health care providers, ordering and interpreting lab/test results and formulating a plan of care.     Medical Decision Making:      [_] Low Complexity  [_] Moderate Complexity  [x] High Complexity      Zhang Guo MD  Department of Hospital Medicine   [facility]

## 2022-10-12 NOTE — PLAN OF CARE
[facility]  Initial Discharge Assessment       Primary Care Provider: Raul Echeverria Jr, MD    Admission Diagnosis: Hydronephrosis with obstructing calculus [N13.2]    Admission Date: 10/11/2022  Expected Discharge Date: 10/13/2022    Discharge Barriers Identified: None    Payor: HUMANA / Plan: HUMANA  PPO / Product Type: PPO /     Extended Emergency Contact Information  Primary Emergency Contact: Jem Alvarez   [place]  Mobile Phone: [phone]  Relation: Father    Discharge Plan A: Home  Discharge Plan B: Home with family      [facility] - [place], [place] - [address]  [address]  [place] [address]  Phone: [phone] Fax: [phone]    SW met with patient at bedside to complete discharge planning assessment.  Patient alert and oriented xs 4.  Patient verified all demographic information on facesheet is correct.  Patient verified PCP is Dr. Echeverria.  Patient verified primary health insurance is [employer].  Patient with NO home health or DME.  Patient with NO POA or Living Will.  Patient not on dialysis or medication coumadin.  Patient with no 30 day admission.  Patient with no financial issues at this time.  Patient family will provide transportation upon discharge from facility.  Patient independent with ADLs, live with spouse, drives self.       Initial Assessment (most recent)       Adult Discharge Assessment - 10/12/22 0932          Discharge Assessment    Assessment Type Discharge Planning Assessment     Confirmed/corrected address, phone number and insurance Yes     Confirmed Demographics Correct on Facesheet     Source of Information patient     Does patient/caregiver understand observation status Yes     Communicated BRIAN with patient/caregiver Yes     Lives With spouse     Facility Arrived From: home     Do you expect to return to your current living situation? Yes     Do you have help at home or someone to help you manage your care at home? Yes      Who are your caregiver(s) and their phone number(s)? spouse     Prior to hospitilization cognitive status: Alert/Oriented     Current cognitive status: Alert/Oriented     Walking or Climbing Stairs Difficulty none     Dressing/Bathing Difficulty none     Equipment Currently Used at Home none     Readmission within 30 days? No     Patient currently being followed by outpatient case management? No     Do you currently have service(s) that help you manage your care at home? No     Do you take prescription medications? Yes     Do you have prescription coverage? Yes     Do you have any problems affording any of your prescribed medications? No     Is the patient taking medications as prescribed? yes     Who is going to help you get home at discharge? spouse     How do you get to doctors appointments? car, drives self     Are you on dialysis? No     Do you take coumadin? No     Discharge Plan A Home     Discharge Plan B Home with family     DME Needed Upon Discharge  none     Discharge Plan discussed with: Patient     Discharge Barriers Identified None        Physical Activity    On average, how many days per week do you engage in moderate to strenuous exercise (like a brisk walk)? 7 days     On average, how many minutes do you engage in exercise at this level? 60 min        Financial Resource Strain    How hard is it for you to pay for the very basics like food, housing, medical care, and heating? Not hard at all        Housing Stability    In the last 12 months, was there a time when you were not able to pay the mortgage or rent on time? No     In the last 12 months, was there a time when you did not have a steady place to sleep or slept in a shelter (including now)? No        Transportation Needs    In the past 12 months, has lack of transportation kept you from medical appointments or from getting medications? No     In the past 12 months, has lack of transportation kept you from meetings, work, or from getting things  needed for daily living? No        Food Insecurity    Within the past 12 months, you worried that your food would run out before you got the money to buy more. Never true     Within the past 12 months, the food you bought just didn't last and you didn't have money to get more. Never true        Stress    Do you feel stress - tense, restless, nervous, or anxious, or unable to sleep at night because your mind is troubled all the time - these days? Not at all        Social Connections    In a typical week, how many times do you talk on the phone with family, friends, or neighbors? More than three times a week     How often do you get together with friends or relatives? More than three times a week     How often do you attend Yarsani or Sikhism services? More than 4 times per year     Do you belong to any clubs or organizations such as Yarsani groups, unions, fraternal or athletic groups, or school groups? No     How often do you attend meetings of the clubs or organizations you belong to? Never     Are you , , , , never , or living with a partner?         Alcohol Use    Q1: How often do you have a drink containing alcohol? Never     Q2: How many drinks containing alcohol do you have on a typical day when you are drinking? Patient does not drink     Q3: How often do you have six or more drinks on one occasion? Never

## 2022-10-12 NOTE — HPI
"47 year old male with history of HTN, and Renal Lithiasis presented to ED complaining of right sided flank pain X 2 days with radiations towards groin/scrotum. He was seen at [facility] last PM where renal lithiasis was diagnosed. He was discharged on ibuprofen and tamsulosin with outpatient follow-up. His discomfort worsened: the pain became sharp and stabbing 8-9/10 with the above rads, constant, worsening with urination. No hematuria, but he has had dysuria and polyuria.    In ED: labs reviewed and noted below: Leukocytosis with normal H/H; mild hyponatremia with worsening stage 3b renal dysfunction (August GFR/Cr = >60/1.0; yesterday = 50/1.7; today = 41.2/2.0). UA: bland. CT reviewed: "Distal ureteral calculus demonstrated on October 10 is now positioned at the ureterovesicular junction. Mild hydronephrosis is noted, increased, with increasing right-sided perinephric edema..."     Discussed with ED MD, who discussed with Urology on call: Inpatient admission for renal lithiasis, complicated by acute kidney injury (worsened by NSAID and ACE) with hydronephrosis, and early pyelonephritis; will continue ceftriaxone, hydration and Urology consultation for stenting in AM; hold lisinopril because of worsening renal function and use prn hydralazine for BP; discussed lisinopril and worsening renal function with patient, and that he will need outpatient follow-up with his PCP to monitor use of ACE and renal function. Patient expressed understanding. AM labs for review.  "

## 2022-10-12 NOTE — CONSULTS
47-year-old male with distal ureteral stone  And accompanying decreased renal function  Significant flank pain last night    Patient will need cystoscopic intervention  Please hold any Toradol at this point, as his renal function is compromised from obstruction

## 2022-10-12 NOTE — SUBJECTIVE & OBJECTIVE
Past Medical History:   Diagnosis Date    Anxiety     Hypertension     not on meds       Past Surgical History:   Procedure Laterality Date    REPAIR OF TORSION OF TESTICLE Right     age 15    VASECTOMY         Review of patient's allergies indicates:  No Known Allergies    No current facility-administered medications on file prior to encounter.     Current Outpatient Medications on File Prior to Encounter   Medication Sig    busPIRone (BUSPAR) 5 MG Tab Take 1 tablet (5 mg total) by mouth 2 (two) times daily.    ibuprofen (ADVIL,MOTRIN) 600 MG tablet Take 1 tablet (600 mg total) by mouth every 6 (six) hours as needed for Pain.    lisinopriL 10 MG tablet Take 1 tablet (10 mg total) by mouth once daily.    ondansetron (ZOFRAN) 4 MG tablet Take 1 tablet (4 mg total) by mouth every 6 (six) hours.    tamsulosin (FLOMAX) 0.4 mg Cap Take 1 capsule (0.4 mg total) by mouth once daily.     Family History       Problem Relation (Age of Onset)    Arthritis Father    Hodgkin's lymphoma Son    Hypertension Father    Nephrolithiasis Father    No Known Problems Mother, Maternal Grandfather, Paternal Grandfather    Stroke Maternal Grandmother, Paternal Grandmother          Tobacco Use    Smoking status: Never    Smokeless tobacco: Never   Substance and Sexual Activity    Alcohol use: Yes     Comment: seldom    Drug use: No    Sexual activity: Yes     Review of Systems   Constitutional: Negative.    HENT: Negative.     Eyes: Negative.    Respiratory: Negative.     Cardiovascular: Negative.    Gastrointestinal: Negative.    Endocrine: Negative.    Genitourinary:  Positive for dysuria and flank pain.   Skin: Negative.    Allergic/Immunologic: Negative.    Neurological: Negative.    Hematological: Negative.    All other systems reviewed and are negative.  Objective:     Vital Signs (Most Recent):  Temp: 98.1 °F (36.7 °C) (10/11/22 1916)  Pulse: 70 (10/11/22 1916)  Resp: 14 (10/11/22 1916)  BP: (!) 151/88 (10/11/22 1916)  SpO2: 98 %  (10/11/22 1916)   Vital Signs (24h Range):  Temp:  [98.1 °F (36.7 °C)-98.4 °F (36.9 °C)] 98.1 °F (36.7 °C)  Pulse:  [70-72] 70  Resp:  [14-18] 14  SpO2:  [98 %-99 %] 98 %  BP: (151-161)/(88-96) 151/88     Weight: 86.2 kg (190 lb)  Body mass index is 30.67 kg/m².    Physical Exam  Vitals and nursing note reviewed.   Constitutional:       Appearance: He is well-developed.   HENT:      Head: Normocephalic and atraumatic.      Right Ear: External ear normal.      Left Ear: External ear normal.      Nose: Nose normal.   Eyes:      Conjunctiva/sclera: Conjunctivae normal.      Pupils: Pupils are equal, round, and reactive to light.   Cardiovascular:      Rate and Rhythm: Normal rate and regular rhythm.      Heart sounds: Normal heart sounds.   Pulmonary:      Effort: Pulmonary effort is normal.      Breath sounds: Normal breath sounds.   Abdominal:      General: Bowel sounds are normal.      Palpations: Abdomen is soft.   Genitourinary:     Comments: Right sided flank discomfort to ballottement and percussion  Musculoskeletal:         General: Normal range of motion.      Cervical back: Normal range of motion and neck supple.   Skin:     General: Skin is warm and dry.      Capillary Refill: Capillary refill takes less than 2 seconds.   Neurological:      Mental Status: He is alert and oriented to person, place, and time.   Psychiatric:         Behavior: Behavior normal.         Thought Content: Thought content normal.         Judgment: Judgment normal.         CRANIAL NERVES     CN III, IV, VI   Pupils are equal, round, and reactive to light.     Significant Labs: All pertinent labs within the past 24 hours have been reviewed.  CBC:   Recent Labs   Lab 10/10/22  1445 10/11/22  1753   WBC 14.21* 16.77*   HGB 15.6 15.3   HCT 47.6 46.9    283     CMP:   Recent Labs   Lab 10/10/22  1445 10/11/22  1753    130*   K 4.2 4.1    100   CO2 28 24   * 102   BUN 16 19   CREATININE 1.70* 2.0*   CALCIUM 9.3 9.3    PROT 7.2 7.4   ALBUMIN 4.5 4.6   BILITOT 0.6 0.9   ALKPHOS 74 62   AST 26 15   ALT 39 28   ANIONGAP 12 6*       Significant Imaging: I have reviewed all pertinent imaging results/findings within the past 24 hours.

## 2022-10-13 ENCOUNTER — ANESTHESIA (OUTPATIENT)
Dept: SURGERY | Facility: HOSPITAL | Age: 47
DRG: 661 | End: 2022-10-13
Payer: COMMERCIAL

## 2022-10-13 ENCOUNTER — ANESTHESIA EVENT (OUTPATIENT)
Dept: SURGERY | Facility: HOSPITAL | Age: 47
DRG: 661 | End: 2022-10-13
Payer: COMMERCIAL

## 2022-10-13 LAB
ANION GAP SERPL CALC-SCNC: 10 MMOL/L (ref 8–16)
BUN SERPL-MCNC: 19 MG/DL (ref 6–20)
CALCIUM SERPL-MCNC: 9.4 MG/DL (ref 8.7–10.5)
CHLORIDE SERPL-SCNC: 101 MMOL/L (ref 95–110)
CO2 SERPL-SCNC: 25 MMOL/L (ref 23–29)
CREAT SERPL-MCNC: 1.7 MG/DL (ref 0.5–1.4)
ERYTHROCYTE [DISTWIDTH] IN BLOOD BY AUTOMATED COUNT: 12.1 % (ref 11.5–14.5)
EST. GFR  (NO RACE VARIABLE): 50 ML/MIN/1.73 M^2
GLUCOSE SERPL-MCNC: 112 MG/DL (ref 70–110)
HCT VFR BLD AUTO: 41.5 % (ref 40–54)
HGB BLD-MCNC: 13.8 G/DL (ref 14–18)
MAGNESIUM SERPL-MCNC: 1.7 MG/DL (ref 1.6–2.6)
MCH RBC QN AUTO: 30.6 PG (ref 27–31)
MCHC RBC AUTO-ENTMCNC: 33.3 G/DL (ref 32–36)
MCV RBC AUTO: 92 FL (ref 82–98)
PLATELET # BLD AUTO: 259 K/UL (ref 150–450)
PMV BLD AUTO: 10.6 FL (ref 9.2–12.9)
POTASSIUM SERPL-SCNC: 4.3 MMOL/L (ref 3.5–5.1)
RBC # BLD AUTO: 4.51 M/UL (ref 4.6–6.2)
SODIUM SERPL-SCNC: 136 MMOL/L (ref 136–145)
WBC # BLD AUTO: 18.52 K/UL (ref 3.9–12.7)

## 2022-10-13 PROCEDURE — C1769 GUIDE WIRE: HCPCS | Performed by: SPECIALIST

## 2022-10-13 PROCEDURE — 63600175 PHARM REV CODE 636 W HCPCS: Performed by: NURSE ANESTHETIST, CERTIFIED REGISTERED

## 2022-10-13 PROCEDURE — 36000707: Performed by: SPECIALIST

## 2022-10-13 PROCEDURE — 27201423 OPTIME MED/SURG SUP & DEVICES STERILE SUPPLY: Performed by: SPECIALIST

## 2022-10-13 PROCEDURE — 36415 COLL VENOUS BLD VENIPUNCTURE: CPT | Performed by: STUDENT IN AN ORGANIZED HEALTH CARE EDUCATION/TRAINING PROGRAM

## 2022-10-13 PROCEDURE — 71000039 HC RECOVERY, EACH ADD'L HOUR: Performed by: SPECIALIST

## 2022-10-13 PROCEDURE — 36000706: Performed by: SPECIALIST

## 2022-10-13 PROCEDURE — 27202107 HC XP QUATRO SENSOR: Performed by: ANESTHESIOLOGY

## 2022-10-13 PROCEDURE — 71000033 HC RECOVERY, INTIAL HOUR: Performed by: SPECIALIST

## 2022-10-13 PROCEDURE — 25000003 PHARM REV CODE 250: Performed by: NURSE ANESTHETIST, CERTIFIED REGISTERED

## 2022-10-13 PROCEDURE — 27200651 HC AIRWAY, LMA: Performed by: ANESTHESIOLOGY

## 2022-10-13 PROCEDURE — 12000002 HC ACUTE/MED SURGE SEMI-PRIVATE ROOM

## 2022-10-13 PROCEDURE — 27000284 HC CANNULA NASAL: Performed by: ANESTHESIOLOGY

## 2022-10-13 PROCEDURE — 25000003 PHARM REV CODE 250: Performed by: STUDENT IN AN ORGANIZED HEALTH CARE EDUCATION/TRAINING PROGRAM

## 2022-10-13 PROCEDURE — 25000003 PHARM REV CODE 250: Performed by: INTERNAL MEDICINE

## 2022-10-13 PROCEDURE — 27000673 HC TUBING BLOOD Y: Performed by: ANESTHESIOLOGY

## 2022-10-13 PROCEDURE — 25000003 PHARM REV CODE 250: Performed by: SPECIALIST

## 2022-10-13 PROCEDURE — 37000009 HC ANESTHESIA EA ADD 15 MINS: Performed by: SPECIALIST

## 2022-10-13 PROCEDURE — 83735 ASSAY OF MAGNESIUM: CPT | Performed by: STUDENT IN AN ORGANIZED HEALTH CARE EDUCATION/TRAINING PROGRAM

## 2022-10-13 PROCEDURE — 63600175 PHARM REV CODE 636 W HCPCS: Performed by: INTERNAL MEDICINE

## 2022-10-13 PROCEDURE — 80048 BASIC METABOLIC PNL TOTAL CA: CPT | Performed by: STUDENT IN AN ORGANIZED HEALTH CARE EDUCATION/TRAINING PROGRAM

## 2022-10-13 PROCEDURE — C2617 STENT, NON-COR, TEM W/O DEL: HCPCS | Performed by: SPECIALIST

## 2022-10-13 PROCEDURE — 37000008 HC ANESTHESIA 1ST 15 MINUTES: Performed by: SPECIALIST

## 2022-10-13 PROCEDURE — 85027 COMPLETE CBC AUTOMATED: CPT | Performed by: STUDENT IN AN ORGANIZED HEALTH CARE EDUCATION/TRAINING PROGRAM

## 2022-10-13 DEVICE — IMPLANTABLE DEVICE: Type: IMPLANTABLE DEVICE | Site: URETER | Status: FUNCTIONAL

## 2022-10-13 RX ORDER — PROPOFOL 10 MG/ML
VIAL (ML) INTRAVENOUS
Status: DISCONTINUED | OUTPATIENT
Start: 2022-10-13 | End: 2022-10-13

## 2022-10-13 RX ORDER — OXYCODONE HYDROCHLORIDE 5 MG/1
5 TABLET ORAL
Status: DISCONTINUED | OUTPATIENT
Start: 2022-10-13 | End: 2022-10-13 | Stop reason: HOSPADM

## 2022-10-13 RX ORDER — SODIUM CHLORIDE, SODIUM LACTATE, POTASSIUM CHLORIDE, CALCIUM CHLORIDE 600; 310; 30; 20 MG/100ML; MG/100ML; MG/100ML; MG/100ML
INJECTION, SOLUTION INTRAVENOUS CONTINUOUS PRN
Status: DISCONTINUED | OUTPATIENT
Start: 2022-10-13 | End: 2022-10-13

## 2022-10-13 RX ORDER — LIDOCAINE HYDROCHLORIDE 20 MG/ML
INJECTION, SOLUTION EPIDURAL; INFILTRATION; INTRACAUDAL; PERINEURAL
Status: DISCONTINUED | OUTPATIENT
Start: 2022-10-13 | End: 2022-10-13

## 2022-10-13 RX ORDER — FENTANYL CITRATE 50 UG/ML
INJECTION, SOLUTION INTRAMUSCULAR; INTRAVENOUS
Status: DISCONTINUED | OUTPATIENT
Start: 2022-10-13 | End: 2022-10-13

## 2022-10-13 RX ORDER — HYDROMORPHONE HYDROCHLORIDE 1 MG/ML
0.2 INJECTION, SOLUTION INTRAMUSCULAR; INTRAVENOUS; SUBCUTANEOUS EVERY 5 MIN PRN
Status: DISCONTINUED | OUTPATIENT
Start: 2022-10-13 | End: 2022-10-13 | Stop reason: HOSPADM

## 2022-10-13 RX ORDER — MEPERIDINE HYDROCHLORIDE 50 MG/ML
12.5 INJECTION INTRAMUSCULAR; INTRAVENOUS; SUBCUTANEOUS EVERY 10 MIN PRN
Status: DISCONTINUED | OUTPATIENT
Start: 2022-10-13 | End: 2022-10-13 | Stop reason: HOSPADM

## 2022-10-13 RX ORDER — DIPHENHYDRAMINE HYDROCHLORIDE 50 MG/ML
12.5 INJECTION INTRAMUSCULAR; INTRAVENOUS
Status: DISCONTINUED | OUTPATIENT
Start: 2022-10-13 | End: 2022-10-13 | Stop reason: HOSPADM

## 2022-10-13 RX ORDER — DIPHENHYDRAMINE HYDROCHLORIDE 50 MG/ML
INJECTION INTRAMUSCULAR; INTRAVENOUS
Status: DISCONTINUED | OUTPATIENT
Start: 2022-10-13 | End: 2022-10-13

## 2022-10-13 RX ORDER — MIDAZOLAM HYDROCHLORIDE 1 MG/ML
INJECTION INTRAMUSCULAR; INTRAVENOUS
Status: DISCONTINUED | OUTPATIENT
Start: 2022-10-13 | End: 2022-10-13

## 2022-10-13 RX ORDER — LACTULOSE 10 G/15ML
20 SOLUTION ORAL ONCE
Status: COMPLETED | OUTPATIENT
Start: 2022-10-13 | End: 2022-10-13

## 2022-10-13 RX ORDER — ONDANSETRON 2 MG/ML
INJECTION INTRAMUSCULAR; INTRAVENOUS
Status: DISCONTINUED | OUTPATIENT
Start: 2022-10-13 | End: 2022-10-13

## 2022-10-13 RX ORDER — FAMOTIDINE 10 MG/ML
INJECTION INTRAVENOUS
Status: DISCONTINUED | OUTPATIENT
Start: 2022-10-13 | End: 2022-10-13

## 2022-10-13 RX ORDER — ONDANSETRON 2 MG/ML
4 INJECTION INTRAMUSCULAR; INTRAVENOUS DAILY PRN
Status: DISCONTINUED | OUTPATIENT
Start: 2022-10-13 | End: 2022-10-13 | Stop reason: HOSPADM

## 2022-10-13 RX ORDER — LIDOCAINE HYDROCHLORIDE 20 MG/ML
JELLY TOPICAL
Status: DISCONTINUED | OUTPATIENT
Start: 2022-10-13 | End: 2022-10-13 | Stop reason: HOSPADM

## 2022-10-13 RX ORDER — ACETAMINOPHEN 10 MG/ML
INJECTION, SOLUTION INTRAVENOUS
Status: DISCONTINUED | OUTPATIENT
Start: 2022-10-13 | End: 2022-10-13

## 2022-10-13 RX ORDER — HYDROCODONE BITARTRATE AND ACETAMINOPHEN 5; 325 MG/1; MG/1
1 TABLET ORAL EVERY 4 HOURS PRN
Status: DISCONTINUED | OUTPATIENT
Start: 2022-10-13 | End: 2022-10-14 | Stop reason: HOSPADM

## 2022-10-13 RX ORDER — CEFAZOLIN SODIUM 1 G/50ML
SOLUTION INTRAVENOUS
Status: DISCONTINUED | OUTPATIENT
Start: 2022-10-13 | End: 2022-10-13

## 2022-10-13 RX ADMIN — LIDOCAINE HYDROCHLORIDE 50 MG: 20 INJECTION, SOLUTION INTRAVENOUS at 08:10

## 2022-10-13 RX ADMIN — CEFAZOLIN SODIUM 2 G: 1 SOLUTION INTRAVENOUS at 08:10

## 2022-10-13 RX ADMIN — FENTANYL CITRATE 100 MCG: 50 INJECTION INTRAMUSCULAR; INTRAVENOUS at 08:10

## 2022-10-13 RX ADMIN — PROPOFOL 150 MG: 10 INJECTION, EMULSION INTRAVENOUS at 08:10

## 2022-10-13 RX ADMIN — TAMSULOSIN HYDROCHLORIDE 0.4 MG: 0.4 CAPSULE ORAL at 10:10

## 2022-10-13 RX ADMIN — FAMOTIDINE 20 MG: 10 INJECTION, SOLUTION INTRAVENOUS at 08:10

## 2022-10-13 RX ADMIN — HYDROCODONE BITARTRATE AND ACETAMINOPHEN 1 TABLET: 5; 325 TABLET ORAL at 02:10

## 2022-10-13 RX ADMIN — CEFTRIAXONE SODIUM 1 G: 1 INJECTION, POWDER, FOR SOLUTION INTRAMUSCULAR; INTRAVENOUS at 09:10

## 2022-10-13 RX ADMIN — DIPHENHYDRAMINE HYDROCHLORIDE 6.25 MG: 50 INJECTION, SOLUTION INTRAMUSCULAR; INTRAVENOUS at 08:10

## 2022-10-13 RX ADMIN — CEFTRIAXONE SODIUM 1 G: 1 INJECTION, POWDER, FOR SOLUTION INTRAMUSCULAR; INTRAVENOUS at 10:10

## 2022-10-13 RX ADMIN — MIDAZOLAM HYDROCHLORIDE 2 MG: 1 INJECTION, SOLUTION INTRAMUSCULAR; INTRAVENOUS at 08:10

## 2022-10-13 RX ADMIN — ONDANSETRON 4 MG: 2 INJECTION INTRAMUSCULAR; INTRAVENOUS at 08:10

## 2022-10-13 RX ADMIN — LACTULOSE 20 G: 20 SOLUTION ORAL at 10:10

## 2022-10-13 RX ADMIN — SODIUM CHLORIDE, SODIUM LACTATE, POTASSIUM CHLORIDE, AND CALCIUM CHLORIDE: .6; .31; .03; .02 INJECTION, SOLUTION INTRAVENOUS at 08:10

## 2022-10-13 RX ADMIN — ACETAMINOPHEN 1000 MG: 10 INJECTION, SOLUTION INTRAVENOUS at 08:10

## 2022-10-13 NOTE — TRANSFER OF CARE
"Anesthesia Transfer of Care Note    Patient: Jem Alvarez    Procedure(s) Performed: Procedure(s) (LRB):  CYSTOSCOPY, WITH URETERAL STENT INSERTION (Right)    Patient location: PACU    Anesthesia Type: general    Transport from OR: Transported from OR on room air with adequate spontaneous ventilation    Post pain: adequate analgesia    Post assessment: no apparent anesthetic complications    Post vital signs: stable    Level of consciousness: awake    Nausea/Vomiting: no nausea/vomiting    Complications: none    Transfer of care protocol was followed      Last vitals:   Visit Vitals  /73   Pulse 78   Temp 37.7 °C (99.8 °F) (Oral)   Resp 18   Ht 5' 6" (1.676 m)   Wt 89 kg (196 lb 4.8 oz)   SpO2 95%   BMI 31.68 kg/m²     "

## 2022-10-13 NOTE — OP NOTE
Operative Note         SUMMARY     Surgery Date:  10/13/2022     Assistant:     Indications:  47-year-old male with obstructing right distal ureteral stone and severe flank pain  Patient also with diminished renal function  Here for emergency cystoscopic stent placement      Pre-op Diagnosis:   Right distal ureteral stone    Post-op Diagnosis:  Same    Procedure:  Cystoscopy with right ureteral stent    Anesthesia: General    Description of Procedure:   Patient brought to cystoscopy suite.  Placed in the cystoscopy position.  Patient is prepped and draped.  Anesthesia is given.  We enter the urinary bladder with the 21 fr cystoscope.   The right ureteral orifice is edematous  No other lesions were found  With cannulate the ureter with a Glidewire  We place our stent easily into the ureter  With the stent good position we removed telescope  Taken to recovery in good condition    Findings/Key Components:          Estimated Blood Loss:      Minimal

## 2022-10-13 NOTE — DISCHARGE SUMMARY
Patient comes in for obstructing right ureteral stone  Here for stent placement    Procedure is done w no complication    After a brief stay in recovery, patient is discharged in good condition    Meds given:  Lortab Keflex    F/u office:  Next week, with KUB

## 2022-10-13 NOTE — PROGRESS NOTES
Patient continues to have significant abdominal pain and flank pain  History of a 3 x 4 mm stone at the right UVJ    Diminished renal function with creatinine 1.7  Plan for emergency cystoscopic stent this morning

## 2022-10-13 NOTE — ANESTHESIA PREPROCEDURE EVALUATION
10/13/2022  Jem Alvarez is a 47 y.o., male.      Patient Active Problem List   Diagnosis    Precordial pain    Anxiety    Smoking    Renal lithiasis    Hydronephrosis with urinary obstruction due to renal calculus    Pyelonephritis    Primary hypertension    KITA (acute kidney injury)       Past Surgical History:   Procedure Laterality Date    REPAIR OF TORSION OF TESTICLE Right     age 15    VASECTOMY          Tobacco Use:  The patient  reports that he has never smoked. He has never used smokeless tobacco.     Results for orders placed or performed during the hospital encounter of 01/14/20   EKG 12-lead    Collection Time: 01/14/20 11:02 AM    Narrative    Test Reason : R07.9,    Vent. Rate : 103 BPM     Atrial Rate : 103 BPM     P-R Int : 126 ms          QRS Dur : 086 ms      QT Int : 314 ms       P-R-T Axes : 059 044 016 degrees     QTc Int : 411 ms    Sinus tachycardia  Nonspecific T wave abnormality  Abnormal ECG  No previous ECGs available  Confirmed by Yovani Cullen MD ([id number]) on 1/15/2020 9:19:58 AM    Referred By: [email or web address]   SELF           Confirmed By:Yovani Cullen MD        Imaging Results          CT Renal Stone Study ABD Pelvis WO (Final result)  Result time 10/11/22 18:26:38    Final result by Babar Treviño MD (10/11/22 18:26:38)                 Narrative:    CT ABDOMEN AND PELVIS WITHOUT CONTRAST (UROLITHIASIS PROTOCOL)    CLINICAL DATA: Nephrolithiasis    CMS MANDATED QUALITY DATA - CT RADIATION  436    All CT scans at this facility utilize dose modulation, iterative reconstruction, and/or weight based dosing when appropriate to reduce radiation dose to as low as reasonably achievable.    Findings: Noninfusion images were obtained from the lung bases to the pubic symphysis.    Comparison is made to a study from one day prior.    Findings:    There is mild linear  scarring or atelectasis at the left lung base.    The liver is unremarkable. Partially contracted gallbladder appears within normal limits. The spleen is normal in size. The pancreas and adrenal glands are within normal limits. The left kidney is normal. There is mild right-sided hydronephrosis, increased compared to the prior study, with increasing right-sided perinephric edema. 3 mm distal right ureteral calculus was previously just proximal to the ureterovesicular junction, and is now directly at the ureterovesicular junction.    The aorta is normal in caliber. Bowel structures are within normal limits. There is no free air or free fluid.    Images of the pelvis demonstrate a normal-appearing urinary bladder and bowel structures. Small fat-containing right inguinal hernia is noted.    IMPRESSION:  1. Distal ureteral calculus demonstrated on October 10 is now positioned at the ureterovesicular junction. Mild hydronephrosis is noted, increased, with increasing right-sided perinephric edema  2. No other significant changes.  3. Small fat-containing right inguinal hernia.    Electronically signed by:  Babar Treviño MD  10/11/2022 6:26 PM CDT Workstation: [id number]                               Lab Results   Component Value Date    WBC 18.52 (H) 10/13/2022    HGB 13.8 (L) 10/13/2022    HCT 41.5 10/13/2022    MCV 92 10/13/2022     10/13/2022     BMP  Lab Results   Component Value Date     10/13/2022    K 4.3 10/13/2022     10/13/2022    CO2 25 10/13/2022    BUN 19 10/13/2022    CREATININE 1.7 (H) 10/13/2022    CALCIUM 9.4 10/13/2022    ANIONGAP 10 10/13/2022     (H) 10/13/2022     10/12/2022     10/12/2022       No results found for this or any previous visit.              Pre-op Assessment    I have reviewed the Patient Summary Reports.     I have reviewed the Nursing Notes. I have reviewed the NPO Status.   I have reviewed the Medications.     Review of  Systems  Anesthesia Hx:  No problems with previous Anesthesia  Denies Family Hx of Anesthesia complications.   Denies Personal Hx of Anesthesia complications.   Social:  Non-Smoker    Hematology/Oncology:  Hematology Normal        Cardiovascular:   Hypertension, well controlled    Pulmonary:  Pulmonary Normal    Renal/:   Chronic Renal Disease (KITA related to obstruction) renal calculi    Hepatic/GI:  Hepatic/GI Normal    Musculoskeletal:  Musculoskeletal Normal    Neurological:  Neurology Normal    Endocrine:  Endocrine Normal    Psych:   anxiety          Physical Exam  General: Well nourished, Cooperative, Alert and Oriented    Airway:  Mallampati: II / II  Mouth Opening: Normal  TM Distance: Normal  Tongue: Normal  Neck ROM: Normal ROM    Dental:  Intact    Chest/Lungs:  Clear to auscultation    Heart:  Rate: Normal  Rhythm: Regular Rhythm  Sounds: Normal    Abdomen:  Normal, Soft, Nontender        Anesthesia Plan  Type of Anesthesia, risks & benefits discussed:    Anesthesia Type: Gen Supraglottic Airway  Intra-op Monitoring Plan: Standard ASA Monitors  Post Op Pain Control Plan: multimodal analgesia and IV/PO Opioids PRN  Induction:  IV  Airway Plan: Video, Post-Induction  Informed Consent: Informed consent signed with the Patient and all parties understand the risks and agree with anesthesia plan.  All questions answered.   ASA Score: 3  Anesthesia Plan Notes: General LMA  No Succinylcholine - ? Assoc. With episode of cardiac arrest in prior surgery  IV tylenol  Zofran, Benadryl 12.5    Ready For Surgery From Anesthesia Perspective.     .

## 2022-10-13 NOTE — ANESTHESIA POSTPROCEDURE EVALUATION
Anesthesia Post Evaluation    Patient: Jem Alvarez    Procedure(s) Performed: Procedure(s) (LRB):  CYSTOSCOPY, WITH URETERAL STENT INSERTION (Right)    Final Anesthesia Type: general      Patient location during evaluation: PACU  Patient participation: Yes- Able to Participate  Level of consciousness: awake and alert and oriented  Post-procedure vital signs: reviewed and stable  Pain management: adequate  Airway patency: patent    PONV status at discharge: No PONV  Anesthetic complications: no      Cardiovascular status: blood pressure returned to baseline and hemodynamically stable  Respiratory status: unassisted, spontaneous ventilation and room air  Hydration status: euvolemic  Follow-up not needed.          Vitals Value Taken Time   /65 10/13/22 0900   Temp 36.8 °C (98.2 °F) 10/13/22 0839   Pulse 80 10/13/22 0912   Resp 20 10/13/22 0912   SpO2 96 % 10/13/22 0912   Vitals shown include unvalidated device data.      No case tracking events are documented in the log.      Pain/Carter Score: Pain Rating Prior to Med Admin: 7 (10/13/2022  2:04 AM)  Pain Rating Post Med Admin: 4 (10/13/2022  3:04 AM)  Carter Score: 10 (10/13/2022  9:00 AM)

## 2022-10-13 NOTE — PROGRESS NOTES
"[facility] Medicine    Progress Note    Patient Name: Jem Alvarez  MRN: [id number]  Patient Class: IP- Inpatient   Admission Date: 10/11/2022  5:03 PM  Length of Stay: 2  Attending Physician: Zhang Guo MD  Primary Care Provider: Raul Echeverria Jr, MD  Face-to-Face encounter date: 10/13/2022  Code status:  Chief Complaint: Abdominal Pain (Pt has abdominal pain and lower back pain, pt was dx with kidney stones yesterday. Pt sent home with prescription flomax, since taking pt states he is unable to urinate, just drops. Feels as though he is going to erupt. )        Subjective:    HPI:47 year old male with history of HTN, and Renal Lithiasis presented to ED complaining of right sided flank pain X 2 days with radiations towards groin/scrotum. He was seen at [facility] last PM where renal lithiasis was diagnosed. He was discharged on ibuprofen and tamsulosin with outpatient follow-up. His discomfort worsened: the pain became sharp and stabbing 8-9/10 with the above rads, constant, worsening with urination. No hematuria, but he has had dysuria and polyuria.  In ED: labs reviewed and noted below: Leukocytosis with normal H/H; mild hyponatremia with worsening stage 3b renal dysfunction (August GFR/Cr = >60/1.0; yesterday = 50/1.7; today = 41.2/2.0). UA: bland. CT reviewed: "Distal ureteral calculus demonstrated on October 10 is now positioned at the ureterovesicular junction. Mild hydronephrosis is noted, increased, with increasing right-sided perinephric edema..."   Discussed with ED MD, who discussed with Urology on call: Inpatient admission for renal lithiasis, complicated by acute kidney injury (worsened by NSAID and ACE) with hydronephrosis, and early pyelonephritis; will continue ceftriaxone, hydration and Urology consultation for stenting in AM; hold lisinopril because of worsening renal function and use prn hydralazine for BP; discussed lisinopril and worsening renal function with " patient, and that he will need outpatient follow-up with his PCP to monitor use of ACE and renal function. Patient expressed understanding. AM labs for review.       Interval History:   10/12: Patient seen alongside Dr Hutton and plan is to take him for cystoscopic intervention today. No concerns/issues overnight reported by the patient or the nursing staff.    10/13:  Patient had stent placement today, currently doing well, would continue to monitor kidney function.    Review of Systems All other Review of Systems were found to be negative expect for that mentioned already in HPI.     Objective:     Vitals:    10/13/22 0845 10/13/22 0900 10/13/22 0915 10/13/22 1229   BP: 113/67 113/65 113/64 101/61   BP Location:    Left arm   Pulse: 81 79 76 79   Resp: 20 18 19 19   Temp:  98.1 °F (36.7 °C)  98.5 °F (36.9 °C)   TempSrc:  Temporal  Oral   SpO2: 97% 98% 97% 95%   Weight:       Height:            Vitals reviewed.  Constitutional: No distress.   HENT: NC  Head: Atraumatic.   Cardiovascular: Normal rate, regular rhythm and normal heart sounds.   Pulmonary/Chest: Effort normal. No wheezes.   Abdominal: Soft. Bowel sounds are normal. No distension and no mass. No tenderness  Neurological: Alert.   Skin: Skin is warm and dry.   Psych: Appropriate mood and affect    Following labs were Reviewed   CBC:  Recent Labs   Lab 10/13/22  0511   WBC 18.52*   HGB 13.8*   HCT 41.5        CMP:  Recent Labs   Lab 10/13/22  0511   CALCIUM 9.4      K 4.3   CO2 25      BUN 19   CREATININE 1.7*       Micro Results  Microbiology Results (last 7 days)       ** No results found for the last 168 hours. **             Radiology Reports  CT Renal Stone Study ABD Pelvis WO    Result Date: 10/11/2022  CT ABDOMEN AND PELVIS WITHOUT CONTRAST (UROLITHIASIS PROTOCOL) CLINICAL DATA: Nephrolithiasis CMS MANDATED QUALITY DATA - CT RADIATION  436 All CT scans at this facility utilize dose modulation, iterative reconstruction,  and/or weight based dosing when appropriate to reduce radiation dose to as low as reasonably achievable. Findings: Noninfusion images were obtained from the lung bases to the pubic symphysis. Comparison is made to a study from one day prior. Findings: There is mild linear scarring or atelectasis at the left lung base. The liver is unremarkable. Partially contracted gallbladder appears within normal limits. The spleen is normal in size. The pancreas and adrenal glands are within normal limits. The left kidney is normal. There is mild right-sided hydronephrosis, increased compared to the prior study, with increasing right-sided perinephric edema. 3 mm distal right ureteral calculus was previously just proximal to the ureterovesicular junction, and is now directly at the ureterovesicular junction. The aorta is normal in caliber. Bowel structures are within normal limits. There is no free air or free fluid. Images of the pelvis demonstrate a normal-appearing urinary bladder and bowel structures. Small fat-containing right inguinal hernia is noted. IMPRESSION: 1. Distal ureteral calculus demonstrated on October 10 is now positioned at the ureterovesicular junction. Mild hydronephrosis is noted, increased, with increasing right-sided perinephric edema 2. No other significant changes. 3. Small fat-containing right inguinal hernia. Electronically signed by:  Babar Treviño MD  10/11/2022 6:26 PM CDT Workstation: [id number]    CT Renal Stone Study ABD Pelvis WO    Result Date: 10/10/2022  EXAMINATION: CT RENAL STONE STUDY ABD PELVIS WO CLINICAL HISTORY: Flank pain, kidney stone suspected; TECHNIQUE: Low dose axial images, sagittal and coronal reformations were obtained from the lung bases to the pubic symphysis.  Contrast was not administered.  Lack of IV contrast limits evaluation of solid intra-abdominal organs. Automated exposure control was utilized. Total  COMPARISON: None FINDINGS: There is a 5 mm stone in  the distal right ureter, approximately 6 mm proximal to the right UVJ.  There is mild right hydroureteronephrosis.  There is mild right perinephric fat stranding.  No additional renal stones are identified.  The urinary bladder is partially distended, with normal appearance. Noncontrast evaluation of the liver, gallbladder, pancreas, spleen and adrenal glands, with no abnormality identified.  Noncontrast distended bowel is nonobstructive.  The appendix is normal.  No enlarged retroperitoneal or pelvic lymph nodes are identified.  There is no free fluid in the abdomen or pelvis.  The prostate is normal in size.  There are some prostate calcifications noted.  There is a small fat containing right inguinal hernia.  No acute bone abnormality is identified.  There is mild spondylosis of the lumbar spine.  Images of the lung bases demonstrate minimal subsegmental dependent atelectasis.     1. Right ureterolithiasis with a 5 mm stone in the distal right ureter, approximately 6 mm proximal to the right UVJ.  There is mild right hydroureteronephrosis and mild right perinephric fat stranding. Electronically signed by: Shahla Aaron MD Date:    10/10/2022 Time:    15:13       Meds  Scheduled Meds:   busPIRone  5 mg Oral BID    cefTRIAXone (ROCEPHIN) IVPB  1 g Intravenous Q12H    enoxaparin  40 mg Subcutaneous Daily    tamsulosin  0.4 mg Oral Daily     Continuous Infusions:   lactated ringers 100 mL/hr at 10/12/22 6583     PRN Meds:.acetaminophen, hydrALAZINE, HYDROcodone-acetaminophen, HYDROmorphone, melatonin, ondansetron.    Assessment & Plan:     KITA (acute kidney injury)  Improving  Continue IVF     Primary hypertension  controlled     Pyelonephritis  Follow up on cultures  Continue IVF        Hydronephrosis with urinary obstruction due to renal calculus  Urology on board  For cystoscopy today        Discharge Planning:   Is the patient medically ready for discharge?: no    Reason for patient still in hospital  (select all that apply): Patient trending condition and Treatment    Above encounter included review of the medical records, interviewing and examining the patient face-to-face, discussion with family and other health care providers, ordering and interpreting lab/test results and formulating a plan of care.     Medical Decision Making:      [_] Low Complexity  [_] Moderate Complexity  [x] High Complexity      Zhang Guo MD  Department of Hospital Medicine   [facility]

## 2022-10-14 VITALS
RESPIRATION RATE: 17 BRPM | WEIGHT: 196.31 LBS | HEART RATE: 76 BPM | TEMPERATURE: 99 F | DIASTOLIC BLOOD PRESSURE: 78 MMHG | HEIGHT: 66 IN | OXYGEN SATURATION: 95 % | SYSTOLIC BLOOD PRESSURE: 111 MMHG | BODY MASS INDEX: 31.55 KG/M2

## 2022-10-14 PROBLEM — N17.9 AKI (ACUTE KIDNEY INJURY): Status: RESOLVED | Noted: 2022-10-11 | Resolved: 2022-10-14

## 2022-10-14 PROBLEM — N12 PYELONEPHRITIS: Status: RESOLVED | Noted: 2022-10-11 | Resolved: 2022-10-14

## 2022-10-14 LAB
ANION GAP SERPL CALC-SCNC: 7 MMOL/L (ref 8–16)
BUN SERPL-MCNC: 14 MG/DL (ref 6–20)
CALCIUM SERPL-MCNC: 8.8 MG/DL (ref 8.7–10.5)
CHLORIDE SERPL-SCNC: 102 MMOL/L (ref 95–110)
CO2 SERPL-SCNC: 28 MMOL/L (ref 23–29)
CREAT SERPL-MCNC: 1.2 MG/DL (ref 0.5–1.4)
ERYTHROCYTE [DISTWIDTH] IN BLOOD BY AUTOMATED COUNT: 11.9 % (ref 11.5–14.5)
EST. GFR  (NO RACE VARIABLE): >60 ML/MIN/1.73 M^2
GLUCOSE SERPL-MCNC: 93 MG/DL (ref 70–110)
HCT VFR BLD AUTO: 40.4 % (ref 40–54)
HGB BLD-MCNC: 13.1 G/DL (ref 14–18)
MCH RBC QN AUTO: 30.2 PG (ref 27–31)
MCHC RBC AUTO-ENTMCNC: 32.4 G/DL (ref 32–36)
MCV RBC AUTO: 93 FL (ref 82–98)
PLATELET # BLD AUTO: 230 K/UL (ref 150–450)
PMV BLD AUTO: 10.5 FL (ref 9.2–12.9)
POTASSIUM SERPL-SCNC: 4 MMOL/L (ref 3.5–5.1)
RBC # BLD AUTO: 4.34 M/UL (ref 4.6–6.2)
SODIUM SERPL-SCNC: 137 MMOL/L (ref 136–145)
WBC # BLD AUTO: 10.64 K/UL (ref 3.9–12.7)

## 2022-10-14 PROCEDURE — 80048 BASIC METABOLIC PNL TOTAL CA: CPT | Performed by: STUDENT IN AN ORGANIZED HEALTH CARE EDUCATION/TRAINING PROGRAM

## 2022-10-14 PROCEDURE — 25000003 PHARM REV CODE 250: Performed by: INTERNAL MEDICINE

## 2022-10-14 PROCEDURE — 25000003 PHARM REV CODE 250: Performed by: STUDENT IN AN ORGANIZED HEALTH CARE EDUCATION/TRAINING PROGRAM

## 2022-10-14 PROCEDURE — 85027 COMPLETE CBC AUTOMATED: CPT | Performed by: STUDENT IN AN ORGANIZED HEALTH CARE EDUCATION/TRAINING PROGRAM

## 2022-10-14 PROCEDURE — 36415 COLL VENOUS BLD VENIPUNCTURE: CPT | Performed by: STUDENT IN AN ORGANIZED HEALTH CARE EDUCATION/TRAINING PROGRAM

## 2022-10-14 RX ORDER — CEPHALEXIN 250 MG/1
500 CAPSULE ORAL EVERY 6 HOURS
Status: DISCONTINUED | OUTPATIENT
Start: 2022-10-14 | End: 2022-10-14 | Stop reason: HOSPADM

## 2022-10-14 RX ADMIN — TAMSULOSIN HYDROCHLORIDE 0.4 MG: 0.4 CAPSULE ORAL at 08:10

## 2022-10-14 RX ADMIN — CEPHALEXIN 500 MG: 250 CAPSULE ORAL at 08:10

## 2022-10-14 NOTE — DISCHARGE SUMMARY
"[facility] Medicine  Discharge Summary      Patient Name: Jem Alvarez  MRN: [id number]  Patient Class: IP- Inpatient  Admission Date: 10/11/2022  Hospital Length of Stay: 3 days  Discharge Date and Time:  10/14/2022 12:30 PM  Attending Physician: Zhang Guo MD   Discharging Provider: Zhang Guo MD  Primary Care Provider: aRul Echeverria Jr, MD      HPI:   47 year old male with history of HTN, and Renal Lithiasis presented to ED complaining of right sided flank pain X 2 days with radiations towards groin/scrotum. He was seen at [facility] last PM where renal lithiasis was diagnosed. He was discharged on ibuprofen and tamsulosin with outpatient follow-up. His discomfort worsened: the pain became sharp and stabbing 8-9/10 with the above rads, constant, worsening with urination. No hematuria, but he has had dysuria and polyuria.    In ED: labs reviewed and noted below: Leukocytosis with normal H/H; mild hyponatremia with worsening stage 3b renal dysfunction (August GFR/Cr = >60/1.0; yesterday = 50/1.7; today = 41.2/2.0). UA: bland. CT reviewed: "Distal ureteral calculus demonstrated on October 10 is now positioned at the ureterovesicular junction. Mild hydronephrosis is noted, increased, with increasing right-sided perinephric edema..."     Discussed with ED MD, who discussed with Urology on call: Inpatient admission for renal lithiasis, complicated by acute kidney injury (worsened by NSAID and ACE) with hydronephrosis, and early pyelonephritis; will continue ceftriaxone, hydration and Urology consultation for stenting in AM; hold lisinopril because of worsening renal function and use prn hydralazine for BP; discussed lisinopril and worsening renal function with patient, and that he will need outpatient follow-up with his PCP to monitor use of ACE and renal function. Patient expressed understanding. AM labs for review.      Procedure(s) (LRB):  CYSTOSCOPY, WITH URETERAL STENT " INSERTION (Right)      Hospital Course:    10/12: Patient seen alongside Dr Hutton and plan is to take him for cystoscopic intervention today. No concerns/issues overnight reported by the patient or the nursing staff.     10/13:  Patient had stent placement today, currently doing well, would continue to monitor kidney function.       10/14:  KITA resolved.  Patient was subsequently discharged.Urologist left script for Lortab and Keflex for patient and patient is to follow-up with him in 1 week with KUB results.    Physical examination on discharge:  Constitutional: No distress.   HENT: NC  Head: Atraumatic.   Cardiovascular: Normal rate, regular rhythm and normal heart sounds.   Pulmonary/Chest: Effort normal. No wheezes.   Abdominal: Soft. Bowel sounds are normal. No distension and no mass. No tenderness  Neurological: Alert.   Skin: Skin is warm and dry.   Psych: Appropriate mood and affect    I have seen the patient on the day of discharge and reviewed the discharge instructions as outlined.      Goals of Care Treatment Preferences:  Code Status: Full Code      Consults:   Consults (From admission, onward)          Status Ordering Provider     Inpatient consult to Urology  Once        Provider:  Alina Carrizales MD    Acknowledged CINTHIA BUCHANAN     Inpatient consult to Urology  Once        Provider:  Tre Hutton MD    Acknowledged ALINA CARRIZALES     Inpatient consult to Internal Medicine  Once        Provider:  Cinthia Buchanan MD    Acknowledged CINTHIA BUCHANAN            No new Assessment & Plan notes have been filed under this hospital service since the last note was generated.  Service: Hospital Medicine    Final Active Diagnoses:    Diagnosis Date Noted POA    PRINCIPAL PROBLEM:  Renal lithiasis [N20.0] 10/11/2022 Yes    Hydronephrosis with urinary obstruction due to renal calculus [N13.2] 10/11/2022 Yes    Primary hypertension [I10] 10/11/2022 Yes      Problems Resolved  During this Admission:    Diagnosis Date Noted Date Resolved POA    Pyelonephritis [N12] 10/11/2022 10/14/2022 Yes    KITA (acute kidney injury) [N17.9] 10/11/2022 10/14/2022 Yes       Discharged Condition: good    Disposition: Home or Self Care    Follow Up:   Follow-up Information       Raul Echeverria Jr, MD Follow up.    Specialty: Internal Medicine  Contact information:  [address]  [place] [address] [phone]               Franki Salcedo NP .    Specialty: Family Medicine  Contact information:  [address]  [place] [address]  [phone]               Tre Hutton MD Follow up in 1 week(s).    Specialty: Urology  Contact information:  [address]  [address]  [place] [address] [phone]                           Patient Instructions:      Diet general     Call MD for:  persistent nausea and vomiting     Call MD for:  severe uncontrolled pain     Call MD for:  difficulty breathing, headache or visual disturbances     Activity as tolerated     Activity as tolerated       Significant Diagnostic Studies: Labs: BMP:   Recent Labs   Lab 10/13/22  0511 10/14/22  0519   * 93    137   K 4.3 4.0    102   CO2 25 28   BUN 19 14   CREATININE 1.7* 1.2   CALCIUM 9.4 8.8   MG 1.7  --    , CMP   Recent Labs   Lab 10/13/22  0511 10/14/22  0519    137   K 4.3 4.0    102   CO2 25 28   * 93   BUN 19 14   CREATININE 1.7* 1.2   CALCIUM 9.4 8.8   ANIONGAP 10 7*   , CBC   Recent Labs   Lab 10/13/22  0511 10/14/22  0519   WBC 18.52* 10.64   HGB 13.8* 13.1*   HCT 41.5 40.4    230   , and All labs within the past 24 hours have been reviewed  Microbiology: Urine Culture  No results found for: LABURIN  Radiology: X-Ray: CXR: X-Ray Chest 1 View (CXR): No results found for this visit on 10/11/22. and X-Ray Chest PA and Lateral (CXR): No results found for this visit on 10/11/22. and KUB: X-Ray Abdomen AP 1 View (KUB): No results found for this visit on  10/11/22.  CT scan: CT ABDOMEN PELVIS WITH CONTRAST: No results found for this visit on 10/11/22. and CT ABDOMEN PELVIS WITHOUT CONTRAST: No results found for this visit on 10/11/22.    Pending Diagnostic Studies:       None           Medications:  Reconciled Home Medications:      Medication List        CONTINUE taking these medications      tamsulosin 0.4 mg Cap  Commonly known as: FLOMAX  Take 1 capsule (0.4 mg total) by mouth once daily.            STOP taking these medications      ibuprofen 600 MG tablet  Commonly known as: ADVIL,MOTRIN     lisinopriL 10 MG tablet     ondansetron 4 MG tablet  Commonly known as: ZOFRAN              Indwelling Lines/Drains at time of discharge:   Lines/Drains/Airways       None                   Time spent on the discharge of patient: 35 minutes         Zhang Guo MD  Department of Hospital Medicine  [facility]

## 2022-10-14 NOTE — PLAN OF CARE
[facility]  Discharge Final Note    Primary Care Provider: Raul Echeverria Jr, MD    Expected Discharge Date: 10/14/2022     met with Pt at bedside to confirm discharge plans. Pt's father (Jem Alvarez (Father) [phone] (Mobile) also present at time. Pt verbalized plan to discharge home via family transport.  inquired about scheduling Pt's PCP appointment. Pt verbalized he will schedule follow-up after visiting his urologist. Pt has no other needs to be addressed by case management. Pt cleared to discharge by case management.      Final Discharge Note (most recent)       Final Note - 10/14/22 0945          Final Note    Assessment Type Final Discharge Note     Anticipated Discharge Disposition Home or Self Care     What phone number can be called within the next 1-3 days to see how you are doing after discharge? [phone]     Hospital Resources/Appts/Education Provided Provided patient/caregiver with written discharge plan information;Patient refused appointment set-up        Post-Acute Status    Discharge Delays None known at this time                     Important Message from Medicare             Contact Info       Raul Echeverria Jr, MD   Specialty: Internal Medicine   Relationship: PCP - General    [address]  [facility] [address]   Phone: [phone]       Next Steps: Follow up    Franki Salcedo NP   Specialty: Family Medicine   Relationship: PCP - Family Medicine    [address]  [facility] [address]   Phone: [phone]       Next Steps: Follow up    Tre Hutton MD   Specialty: Urology    [address]  [address]  [facility] [address]   Phone: [phone]       Next Steps: Follow up in 1 week(s)

## 2022-10-17 ENCOUNTER — TELEPHONE (OUTPATIENT)
Dept: FAMILY MEDICINE | Facility: CLINIC | Age: 47
End: 2022-10-17

## 2022-10-17 ENCOUNTER — HOSPITAL ENCOUNTER (OUTPATIENT)
Dept: RADIOLOGY | Facility: HOSPITAL | Age: 47
Discharge: HOME | End: 2022-10-17
Attending: STUDENT IN AN ORGANIZED HEALTH CARE EDUCATION/TRAINING PROGRAM
Payer: COMMERCIAL

## 2022-10-17 DIAGNOSIS — N13.2 HYDRONEPHROSIS WITH URINARY OBSTRUCTION DUE TO RENAL CALCULUS: ICD-10-CM

## 2022-10-17 PROCEDURE — 74018 RADEX ABDOMEN 1 VIEW: CPT | Mod: TC,PO

## 2022-10-17 NOTE — TELEPHONE ENCOUNTER
Patient was recently in hospital and they took him off of the lisinopril 10 mg. Because it was affecting his kidneys. He was told to follow up with you for a change in bp meds. Patient has been without bp meds for a week now and is beginning to feel the effect.

## 2022-10-17 NOTE — TELEPHONE ENCOUNTER
Patient states that he haven't been taking his bp, but he just have a funny feeling inside. Some facial and arm tingling.

## 2022-10-18 ENCOUNTER — LAB VISIT (OUTPATIENT)
Dept: LAB | Facility: HOSPITAL | Age: 47
End: 2022-10-18
Attending: SPECIALIST
Payer: COMMERCIAL

## 2022-10-18 ENCOUNTER — OFFICE VISIT (OUTPATIENT)
Dept: FAMILY MEDICINE | Facility: CLINIC | Age: 47
End: 2022-10-18
Payer: COMMERCIAL

## 2022-10-18 VITALS
WEIGHT: 191.19 LBS | OXYGEN SATURATION: 95 % | DIASTOLIC BLOOD PRESSURE: 92 MMHG | RESPIRATION RATE: 16 BRPM | HEART RATE: 83 BPM | SYSTOLIC BLOOD PRESSURE: 122 MMHG | BODY MASS INDEX: 30.73 KG/M2 | HEIGHT: 66 IN | TEMPERATURE: 99 F

## 2022-10-18 DIAGNOSIS — I10 ESSENTIAL HYPERTENSION: Primary | ICD-10-CM

## 2022-10-18 DIAGNOSIS — N20.1 CALCULUS OF URETER: Primary | ICD-10-CM

## 2022-10-18 DIAGNOSIS — N20.0 KIDNEY STONE: ICD-10-CM

## 2022-10-18 DIAGNOSIS — Z09 HOSPITAL DISCHARGE FOLLOW-UP: ICD-10-CM

## 2022-10-18 LAB
ANION GAP SERPL CALC-SCNC: 8 MMOL/L (ref 8–16)
BUN SERPL-MCNC: 17 MG/DL (ref 6–20)
CALCIUM SERPL-MCNC: 9.4 MG/DL (ref 8.7–10.5)
CHLORIDE SERPL-SCNC: 101 MMOL/L (ref 95–110)
CO2 SERPL-SCNC: 29 MMOL/L (ref 23–29)
CREAT SERPL-MCNC: 1.1 MG/DL (ref 0.5–1.4)
EST. GFR  (NO RACE VARIABLE): >60 ML/MIN/1.73 M^2
GLUCOSE SERPL-MCNC: 76 MG/DL (ref 70–110)
POTASSIUM SERPL-SCNC: 4.3 MMOL/L (ref 3.5–5.1)
SODIUM SERPL-SCNC: 138 MMOL/L (ref 136–145)

## 2022-10-18 PROCEDURE — 4010F ACE/ARB THERAPY RXD/TAKEN: CPT | Mod: CPTII,S$GLB,, | Performed by: NURSE PRACTITIONER

## 2022-10-18 PROCEDURE — 80048 BASIC METABOLIC PNL TOTAL CA: CPT | Performed by: SPECIALIST

## 2022-10-18 PROCEDURE — 1159F MED LIST DOCD IN RCRD: CPT | Mod: CPTII,S$GLB,, | Performed by: NURSE PRACTITIONER

## 2022-10-18 PROCEDURE — 1159F PR MEDICATION LIST DOCUMENTED IN MEDICAL RECORD: ICD-10-PCS | Mod: CPTII,S$GLB,, | Performed by: NURSE PRACTITIONER

## 2022-10-18 PROCEDURE — 1111F DSCHRG MED/CURRENT MED MERGE: CPT | Mod: CPTII,S$GLB,, | Performed by: NURSE PRACTITIONER

## 2022-10-18 PROCEDURE — 1111F PR DISCHARGE MEDS RECONCILED W/ CURRENT OUTPATIENT MED LIST: ICD-10-PCS | Mod: CPTII,S$GLB,, | Performed by: NURSE PRACTITIONER

## 2022-10-18 PROCEDURE — 4010F PR ACE/ARB THEARPY RXD/TAKEN: ICD-10-PCS | Mod: CPTII,S$GLB,, | Performed by: NURSE PRACTITIONER

## 2022-10-18 PROCEDURE — 3074F SYST BP LT 130 MM HG: CPT | Mod: CPTII,S$GLB,, | Performed by: NURSE PRACTITIONER

## 2022-10-18 PROCEDURE — 3074F PR MOST RECENT SYSTOLIC BLOOD PRESSURE < 130 MM HG: ICD-10-PCS | Mod: CPTII,S$GLB,, | Performed by: NURSE PRACTITIONER

## 2022-10-18 PROCEDURE — 36415 COLL VENOUS BLD VENIPUNCTURE: CPT | Performed by: SPECIALIST

## 2022-10-18 PROCEDURE — 99214 PR OFFICE/OUTPT VISIT, EST, LEVL IV, 30-39 MIN: ICD-10-PCS | Mod: S$GLB,,, | Performed by: NURSE PRACTITIONER

## 2022-10-18 PROCEDURE — 99214 OFFICE O/P EST MOD 30 MIN: CPT | Mod: S$GLB,,, | Performed by: NURSE PRACTITIONER

## 2022-10-18 PROCEDURE — 3080F DIAST BP >= 90 MM HG: CPT | Mod: CPTII,S$GLB,, | Performed by: NURSE PRACTITIONER

## 2022-10-18 PROCEDURE — 3080F PR MOST RECENT DIASTOLIC BLOOD PRESSURE >= 90 MM HG: ICD-10-PCS | Mod: CPTII,S$GLB,, | Performed by: NURSE PRACTITIONER

## 2022-10-18 PROCEDURE — 1160F RVW MEDS BY RX/DR IN RCRD: CPT | Mod: CPTII,S$GLB,, | Performed by: NURSE PRACTITIONER

## 2022-10-18 PROCEDURE — 1160F PR REVIEW ALL MEDS BY PRESCRIBER/CLIN PHARMACIST DOCUMENTED: ICD-10-PCS | Mod: CPTII,S$GLB,, | Performed by: NURSE PRACTITIONER

## 2022-10-18 RX ORDER — CEPHALEXIN 500 MG/1
500 CAPSULE ORAL 3 TIMES DAILY
COMMUNITY
Start: 2022-10-14 | End: 2022-10-24

## 2022-10-18 RX ORDER — LISINOPRIL 10 MG/1
10 TABLET ORAL DAILY
Qty: 90 TABLET | Refills: 0 | Status: SHIPPED | OUTPATIENT
Start: 2022-10-18 | End: 2022-10-31

## 2022-10-18 NOTE — PROGRESS NOTES
SUBJECTIVE:      Patient ID: Jem Alvarez is a 47 y.o. male.    Chief Complaint: Hypertension, Tingling (L arm), and Headache    Patient is here today as a hospital follow up for renal lithiasis, complicated by acute kidney injury with hydronephrosis. Urology was consulted and a stent was placed. He did see Dr Hutton today and has surgery scheduled in 2 weeks for stone removal. He is doing well today. His lisinopril was held for KITA and he has not restarted it. His kidney function did return to normal. Creatinine wnl     Hypertension  This is a chronic problem. The current episode started more than 1 year ago. The problem is unchanged. Pertinent negatives include no chest pain, headaches, palpitations or shortness of breath. Risk factors for coronary artery disease include male gender. Past treatments include ACE inhibitors. There are no compliance problems.      Past Surgical History:   Procedure Laterality Date    CYSTOSCOPY W/ URETERAL STENT PLACEMENT Right 10/13/2022    Procedure: CYSTOSCOPY, WITH URETERAL STENT INSERTION;  Surgeon: Tre Hutton MD;  Location: [facility];  Service: Urology;  Laterality: Right;    REPAIR OF TORSION OF TESTICLE Right     age 15    VASECTOMY       Family History   Problem Relation Age of Onset    Hypertension Unknown     No Known Problems Mother     Hypertension Father     Arthritis Father     Nephrolithiasis Father     Stroke Maternal Grandmother     No Known Problems Maternal Grandfather     Stroke Paternal Grandmother     No Known Problems Paternal Grandfather     Hodgkin's lymphoma Son         age 17    Diabetes Neg Hx       Social History     Socioeconomic History    Marital status:    Occupational History    Occupation:     Occupation: fire department   Tobacco Use    Smoking status: Some Days     Packs/day: 0.50     Types: Cigarettes     Start date: 1/1/2015    Smokeless tobacco: Never   Substance and Sexual Activity     Alcohol use: Yes     Comment: seldom    Drug use: No    Sexual activity: Yes   Social History Narrative    Live with partner     Social Determinants of Health     Financial Resource Strain: Low Risk     Difficulty of Paying Living Expenses: Not hard at all   Food Insecurity: No Food Insecurity    Worried About Running Out of Food in the Last Year: Never true    Ran Out of Food in the Last Year: Never true   Transportation Needs: No Transportation Needs    Lack of Transportation (Medical): No    Lack of Transportation (Non-Medical): No   Physical Activity: Sufficiently Active    Days of Exercise per Week: 7 days    Minutes of Exercise per Session: 60 min   Stress: No Stress Concern Present    Feeling of Stress : Not at all   Social Connections: Moderately Integrated    Frequency of Communication with Friends and Family: More than three times a week    Frequency of Social Gatherings with Friends and Family: More than three times a week    Attends [employer] Services: More than 4 times per year    Active Member of Clubs or Organizations: No    Attends Club or Organization Meetings: Never    Marital Status:    Housing Stability: Unknown    Unable to Pay for Housing in the Last Year: No    Unstable Housing in the Last Year: No     Current Outpatient Medications   Medication Sig Dispense Refill    cephALEXin (KEFLEX) 500 MG capsule Take 500 mg by mouth 3 (three) times daily.      lisinopriL 10 MG tablet Take 1 tablet (10 mg total) by mouth once daily. 90 tablet 0    tamsulosin (FLOMAX) 0.4 mg Cap Take 1 capsule (0.4 mg total) by mouth once daily. 30 capsule 0     No current facility-administered medications for this visit.     Review of patient's allergies indicates:   Allergen Reactions    Anectine [succinylcholine chloride] Anaphylaxis     Stopped breathing     Anectine dalton-pack       Past Medical History:   Diagnosis Date    Anxiety     Hypertension     not on meds     Past Surgical History:   Procedure  "Laterality Date    CYSTOSCOPY W/ URETERAL STENT PLACEMENT Right 10/13/2022    Procedure: CYSTOSCOPY, WITH URETERAL STENT INSERTION;  Surgeon: Tre Hutton MD;  Location: [facility];  Service: Urology;  Laterality: Right;    REPAIR OF TORSION OF TESTICLE Right     age 15    VASECTOMY         Review of Systems   Constitutional:  Negative for appetite change, chills, diaphoresis and unexpected weight change.   HENT:  Negative for ear discharge, facial swelling, nosebleeds and trouble swallowing.    Eyes:  Negative for visual disturbance.   Respiratory:  Negative for apnea, choking, shortness of breath and wheezing.    Cardiovascular:  Negative for chest pain and palpitations.   Gastrointestinal:  Negative for abdominal pain, blood in stool and nausea.   Endocrine: Negative for polyphagia.   Genitourinary:  Negative for difficulty urinating and hematuria.   Neurological:  Negative for dizziness, weakness, light-headedness and headaches.   Hematological:  Does not bruise/bleed easily.   Psychiatric/Behavioral:  Negative for agitation, dysphoric mood, self-injury, sleep disturbance and suicidal ideas. The patient is not nervous/anxious.     OBJECTIVE:      Vitals:    10/18/22 1437 10/18/22 1500   BP: (!) 138/96 (!) 122/92   BP Location: Left arm    Patient Position: Sitting    Pulse: 83    Resp: 16    Temp: 98.7 °F (37.1 °C)    SpO2: 95%    Weight: 86.7 kg (191 lb 3.2 oz)    Height: 5' 6" (1.676 m)      Physical Exam  Vitals and nursing note reviewed.   Constitutional:       General: He is not in acute distress.     Appearance: He is well-developed.   HENT:      Head: Normocephalic and atraumatic.      Nose: Nose normal.      Mouth/Throat:      Pharynx: Uvula midline.   Eyes:      General: Lids are normal.      Conjunctiva/sclera: Conjunctivae normal.      Pupils: Pupils are equal, round, and reactive to light.      Right eye: Pupil is round and reactive.      Left eye: Pupil is round and reactive.   Neck:      " Thyroid: No thyromegaly.      Vascular: No carotid bruit.   Cardiovascular:      Rate and Rhythm: Normal rate and regular rhythm.      Pulses: Normal pulses.      Heart sounds: Normal heart sounds. No murmur heard.  Pulmonary:      Effort: Pulmonary effort is normal.      Breath sounds: Normal breath sounds.   Abdominal:      General: Bowel sounds are normal.      Palpations: Abdomen is soft. Abdomen is not rigid.      Tenderness: There is no abdominal tenderness.   Musculoskeletal:         General: Normal range of motion.      Cervical back: Normal range of motion and neck supple.      Right lower leg: No edema.      Left lower leg: No edema.   Lymphadenopathy:      Cervical: No cervical adenopathy.   Skin:     General: Skin is warm and dry.      Nails: There is no clubbing.   Neurological:      Mental Status: He is alert and oriented to person, place, and time.   Psychiatric:         Mood and Affect: Mood normal.         Speech: Speech normal.         Behavior: Behavior normal. Behavior is cooperative.         Thought Content: Thought content normal.         Judgment: Judgment normal.      Lab Visit on 10/18/2022   Component Date Value Ref Range Status    Sodium 10/18/2022 138  136 - 145 mmol/L Final    Potassium 10/18/2022 4.3  3.5 - 5.1 mmol/L Final    Chloride 10/18/2022 101  95 - 110 mmol/L Final    CO2 10/18/2022 29  23 - 29 mmol/L Final    Glucose 10/18/2022 76  70 - 110 mg/dL Final    BUN 10/18/2022 17  6 - 20 mg/dL Final    Creatinine 10/18/2022 1.1  0.5 - 1.4 mg/dL Final    Calcium 10/18/2022 9.4  8.7 - 10.5 mg/dL Final    Anion Gap 10/18/2022 8  8 - 16 mmol/L Final    eGFR 10/18/2022 >60.0  >60 mL/min/1.73 m^2 Final   ]  Assessment:       1. Essential hypertension    2. Kidney stone    3. Hospital discharge follow-up        Plan:       Essential hypertension  -  restart   lisinopriL 10 MG tablet; Take 1 tablet (10 mg total) by mouth once daily.  Dispense: 90 tablet; Refill: 0  -     Comprehensive  Metabolic Panel; Future; Expected date: 11/01/2022    Kidney stone  -     Comprehensive Metabolic Panel; Future; Expected date: 11/01/2022  F/u with urology as scheduled    Hospital discharge follow-up  Hospital records reviewed  Medications reconciled    Follow up in about 5 weeks (around 11/22/2022) for htn .      10/18/2022 JOLENE Ibrahim, FNP

## 2022-10-25 ENCOUNTER — HOSPITAL ENCOUNTER (EMERGENCY)
Facility: HOSPITAL | Age: 47
Discharge: HOME | End: 2022-10-26
Attending: EMERGENCY MEDICINE
Payer: COMMERCIAL

## 2022-10-25 DIAGNOSIS — R55 SYNCOPE, UNSPECIFIED SYNCOPE TYPE: Primary | ICD-10-CM

## 2022-10-25 DIAGNOSIS — R07.9 CHEST PAIN: ICD-10-CM

## 2022-10-25 LAB
ALBUMIN SERPL BCP-MCNC: 4.6 G/DL (ref 3.5–5.2)
ALP SERPL-CCNC: 69 U/L (ref 55–135)
ALT SERPL W/O P-5'-P-CCNC: 38 U/L (ref 10–44)
ANION GAP SERPL CALC-SCNC: 10 MMOL/L (ref 8–16)
AST SERPL-CCNC: 21 U/L (ref 10–40)
BASOPHILS # BLD AUTO: 0.08 K/UL (ref 0–0.2)
BASOPHILS NFR BLD: 0.7 % (ref 0–1.9)
BILIRUB SERPL-MCNC: 1 MG/DL (ref 0.1–1)
BNP SERPL-MCNC: 6 PG/ML (ref 0–99)
BUN SERPL-MCNC: 17 MG/DL (ref 6–20)
CALCIUM SERPL-MCNC: 9.5 MG/DL (ref 8.7–10.5)
CHLORIDE SERPL-SCNC: 101 MMOL/L (ref 95–110)
CO2 SERPL-SCNC: 25 MMOL/L (ref 23–29)
CREAT SERPL-MCNC: 1.3 MG/DL (ref 0.5–1.4)
DIFFERENTIAL METHOD: ABNORMAL
EOSINOPHIL # BLD AUTO: 0.1 K/UL (ref 0–0.5)
EOSINOPHIL NFR BLD: 0.4 % (ref 0–8)
ERYTHROCYTE [DISTWIDTH] IN BLOOD BY AUTOMATED COUNT: 11.9 % (ref 11.5–14.5)
EST. GFR  (NO RACE VARIABLE): >60 ML/MIN/1.73 M^2
GLUCOSE SERPL-MCNC: 114 MG/DL (ref 70–110)
HCT VFR BLD AUTO: 46.5 % (ref 40–54)
HGB BLD-MCNC: 15.3 G/DL (ref 14–18)
IMM GRANULOCYTES # BLD AUTO: 0.26 K/UL (ref 0–0.04)
IMM GRANULOCYTES NFR BLD AUTO: 2.2 % (ref 0–0.5)
INR PPP: 1.1
LYMPHOCYTES # BLD AUTO: 2.1 K/UL (ref 1–4.8)
LYMPHOCYTES NFR BLD: 18 % (ref 18–48)
MCH RBC QN AUTO: 30.2 PG (ref 27–31)
MCHC RBC AUTO-ENTMCNC: 32.9 G/DL (ref 32–36)
MCV RBC AUTO: 92 FL (ref 82–98)
MONOCYTES # BLD AUTO: 1.4 K/UL (ref 0.3–1)
MONOCYTES NFR BLD: 11.7 % (ref 4–15)
NEUTROPHILS # BLD AUTO: 7.9 K/UL (ref 1.8–7.7)
NEUTROPHILS NFR BLD: 67 % (ref 38–73)
NRBC BLD-RTO: 0 /100 WBC
PLATELET # BLD AUTO: 372 K/UL (ref 150–450)
PMV BLD AUTO: 9.6 FL (ref 9.2–12.9)
POTASSIUM SERPL-SCNC: 3.7 MMOL/L (ref 3.5–5.1)
PROT SERPL-MCNC: 7.7 G/DL (ref 6–8.4)
PROTHROMBIN TIME: 13.3 SEC (ref 11.4–13.7)
RBC # BLD AUTO: 5.07 M/UL (ref 4.6–6.2)
SODIUM SERPL-SCNC: 136 MMOL/L (ref 136–145)
TROPONIN I SERPL DL<=0.01 NG/ML-MCNC: <0.03 NG/ML
WBC # BLD AUTO: 11.83 K/UL (ref 3.9–12.7)

## 2022-10-25 PROCEDURE — 93010 ELECTROCARDIOGRAM REPORT: CPT | Mod: ,,, | Performed by: SPECIALIST

## 2022-10-25 PROCEDURE — 85610 PROTHROMBIN TIME: CPT | Performed by: EMERGENCY MEDICINE

## 2022-10-25 PROCEDURE — 25000003 PHARM REV CODE 250: Performed by: EMERGENCY MEDICINE

## 2022-10-25 PROCEDURE — 85025 COMPLETE CBC W/AUTO DIFF WBC: CPT | Performed by: EMERGENCY MEDICINE

## 2022-10-25 PROCEDURE — 99284 EMERGENCY DEPT VISIT MOD MDM: CPT | Mod: 25

## 2022-10-25 PROCEDURE — 80053 COMPREHEN METABOLIC PANEL: CPT | Performed by: EMERGENCY MEDICINE

## 2022-10-25 PROCEDURE — 96360 HYDRATION IV INFUSION INIT: CPT

## 2022-10-25 PROCEDURE — 93010 EKG 12-LEAD: ICD-10-PCS | Mod: ,,, | Performed by: SPECIALIST

## 2022-10-25 PROCEDURE — 83880 ASSAY OF NATRIURETIC PEPTIDE: CPT | Performed by: EMERGENCY MEDICINE

## 2022-10-25 PROCEDURE — 84484 ASSAY OF TROPONIN QUANT: CPT | Performed by: EMERGENCY MEDICINE

## 2022-10-25 PROCEDURE — 93005 ELECTROCARDIOGRAM TRACING: CPT | Performed by: SPECIALIST

## 2022-10-25 RX ADMIN — SODIUM CHLORIDE 1000 ML: 0.9 INJECTION, SOLUTION INTRAVENOUS at 11:10

## 2022-10-26 ENCOUNTER — TELEPHONE (OUTPATIENT)
Dept: FAMILY MEDICINE | Facility: CLINIC | Age: 47
End: 2022-10-26

## 2022-10-26 VITALS
TEMPERATURE: 98 F | SYSTOLIC BLOOD PRESSURE: 112 MMHG | DIASTOLIC BLOOD PRESSURE: 76 MMHG | WEIGHT: 191 LBS | RESPIRATION RATE: 18 BRPM | HEIGHT: 66 IN | BODY MASS INDEX: 30.7 KG/M2 | HEART RATE: 90 BPM | OXYGEN SATURATION: 97 %

## 2022-10-26 LAB
BACTERIA #/AREA URNS HPF: NEGATIVE /HPF
BILIRUB UR QL STRIP: NEGATIVE
CLARITY UR: CLEAR
COLOR UR: COLORLESS
GLUCOSE UR QL STRIP: NEGATIVE
HGB UR QL STRIP: ABNORMAL
HYALINE CASTS #/AREA URNS LPF: 0 /LPF
KETONES UR QL STRIP: NEGATIVE
LEUKOCYTE ESTERASE UR QL STRIP: ABNORMAL
MICROSCOPIC COMMENT: ABNORMAL
NITRITE UR QL STRIP: NEGATIVE
PH UR STRIP: 6 [PH] (ref 5–8)
PROT UR QL STRIP: NEGATIVE
RBC #/AREA URNS HPF: 7 /HPF (ref 0–4)
SP GR UR STRIP: 1 (ref 1–1.03)
SQUAMOUS #/AREA URNS HPF: 1 /HPF
URN SPEC COLLECT METH UR: ABNORMAL
UROBILINOGEN UR STRIP-ACNC: NEGATIVE EU/DL
WBC #/AREA URNS HPF: 3 /HPF (ref 0–5)

## 2022-10-26 PROCEDURE — 25000003 PHARM REV CODE 250: Performed by: EMERGENCY MEDICINE

## 2022-10-26 PROCEDURE — 81001 URINALYSIS AUTO W/SCOPE: CPT | Performed by: EMERGENCY MEDICINE

## 2022-10-26 RX ORDER — ACETAMINOPHEN 500 MG
1000 TABLET ORAL
Status: COMPLETED | OUTPATIENT
Start: 2022-10-26 | End: 2022-10-26

## 2022-10-26 RX ADMIN — ACETAMINOPHEN 1000 MG: 500 TABLET ORAL at 02:10

## 2022-10-26 NOTE — ED PROVIDER NOTES
"Encounter Date: 10/25/2022       History     Chief Complaint   Patient presents with    Loss of Consciousness     Pt presents to ER via EMS with c/o syncopal episode x 2.      Chief complaint is syncopal episode.  The patient came in by ambulance.  He was eating dinner at a Mexican restaurant.  He ate chips and salsa and beans.  He took a bite of his burrito then leaned over toward his friends who were eating with him  and he had a syncopal episode for about a minute he then was slightly groggy but then woke up over another minute or so.  His accucheck was normal. He drank a slight amount of water.  He was then brought here for evaluation.  According to his wife she thinks he is definitely dehydrated.  Did he did drink some cranberry juice water earlier today.  At the present time he only has a mild headache.  He takes lisinopril 10 mg once a day.  The patient recently was on antibiotics.  He recently had a stent put in for kidney stones.      Review of patient's allergies indicates:   Allergen Reactions    Anectine [succinylcholine chloride] Anaphylaxis     "Stopped breathing and Heart stopped" @ age 15    Anectine dalton-pack      Past Medical History:   Diagnosis Date    Anxiety     Hypertension     not on meds    Kidney stone      Past Surgical History:   Procedure Laterality Date    CYSTOSCOPY W/ URETERAL STENT PLACEMENT Right 10/13/2022    Procedure: CYSTOSCOPY, WITH URETERAL STENT INSERTION;  Surgeon: Tre Hutton MD;  Location: [facility];  Service: Urology;  Laterality: Right;    REPAIR OF TORSION OF TESTICLE Right     age 15    VASECTOMY       Family History   Problem Relation Age of Onset    Hypertension Unknown     No Known Problems Mother     Hypertension Father     Arthritis Father     Nephrolithiasis Father     Stroke Maternal Grandmother     No Known Problems Maternal Grandfather     Stroke Paternal Grandmother     No Known Problems Paternal Grandfather     Hodgkin's lymphoma Son         age 17    " Diabetes Neg Hx      Social History     Tobacco Use    Smoking status: Some Days     Packs/day: 0.50     Types: Cigarettes     Start date: 1/1/2015    Smokeless tobacco: Never   Substance Use Topics    Alcohol use: Yes     Comment: seldom    Drug use: No     Review of Systems   Constitutional:  Negative for chills and fever.   HENT:  Negative for ear pain, rhinorrhea and sore throat.    Eyes:  Negative for pain and visual disturbance.   Respiratory:  Negative for cough and shortness of breath.    Cardiovascular:  Negative for chest pain and palpitations.   Gastrointestinal:  Negative for abdominal pain, constipation, diarrhea, nausea and vomiting.   Genitourinary:  Negative for dysuria, frequency, hematuria and urgency.   Musculoskeletal:  Negative for back pain, joint swelling and myalgias.   Skin:  Negative for rash.   Neurological:  Positive for syncope. Negative for dizziness, seizures, weakness and headaches.   Psychiatric/Behavioral:  Negative for dysphoric mood. The patient is not nervous/anxious.      Physical Exam     Initial Vitals   BP Pulse Resp Temp SpO2   10/25/22 2101 10/25/22 2101 10/25/22 2101 10/25/22 2105 10/25/22 2101   98/63 94 17 98.2 °F (36.8 °C) 98 %      MAP       --                Physical Exam    Nursing note and vitals reviewed.  Constitutional: He appears well-developed and well-nourished.   HENT:   Head: Normocephalic and atraumatic.   Eyes: Conjunctivae, EOM and lids are normal. Pupils are equal, round, and reactive to light.   Neck: Trachea normal. Neck supple. No thyroid mass present.   Cardiovascular:  Normal rate, regular rhythm and normal heart sounds.           Pulmonary/Chest: Breath sounds normal. No respiratory distress.   Abdominal: Abdomen is soft. Bowel sounds are normal. There is no abdominal tenderness.   Musculoskeletal:         General: Normal range of motion.      Cervical back: Neck supple.     Neurological: He is alert and oriented to person, place, and time. He has  normal strength and normal reflexes. No cranial nerve deficit or sensory deficit.   Skin: Skin is warm and dry.   Psychiatric: He has a normal mood and affect. His speech is normal and behavior is normal. Judgment and thought content normal.       ED Course   Procedures  Labs Reviewed   CBC W/ AUTO DIFFERENTIAL - Abnormal; Notable for the following components:       Result Value    Immature Granulocytes 2.2 (*)     Gran # (ANC) 7.9 (*)     Immature Grans (Abs) 0.26 (*)     Mono # 1.4 (*)     All other components within normal limits   COMPREHENSIVE METABOLIC PANEL - Abnormal; Notable for the following components:    Glucose 114 (*)     All other components within normal limits   URINALYSIS, REFLEX TO URINE CULTURE - Abnormal; Notable for the following components:    Color, UA Colorless (*)     Occult Blood UA 2+ (*)     Leukocytes, UA Trace (*)     All other components within normal limits    Narrative:     Specimen Source->Urine   URINALYSIS MICROSCOPIC - Abnormal; Notable for the following components:    RBC, UA 7 (*)     All other components within normal limits    Narrative:     Specimen Source->Urine   B-TYPE NATRIURETIC PEPTIDE   TROPONIN I   PROTIME-INR          Imaging Results              X-Ray Chest AP Portable (In process)                      Medications   sodium chloride 0.9% bolus 1,000 mL (0 mLs Intravenous Stopped 10/26/22 0005)   acetaminophen tablet 1,000 mg (1,000 mg Oral Given 10/26/22 0212)     Medical Decision Making:   ED Management:  Labs reviewed.  Patient recently had stent placed.  No longer on antibiotics.  UA shows only trace leukocytes will wait for urine culture on that aspect of the workup.  Patient felt better after fluids and was discharged home in good condition.  He was given instructions to follow-up and return as needed.                        Clinical Impression:   Final diagnoses:  [R07.9] Chest pain  [R55] Syncope, unspecified syncope type (Primary)        ED Disposition  Condition    Discharge Stable          ED Prescriptions    None       Follow-up Information       Follow up With Specialties Details Why Contact Info    Franki Salcedo NP Family Medicine Schedule an appointment as soon as possible for a visit in 1 week  [address] [place] [id number]  [phone]               Alondra Lopez MD  10/26/22 2048

## 2022-10-26 NOTE — TELEPHONE ENCOUNTER
He can hold the lisinopril for now and cont to monitor his blood pressure. We may need to decrease to 5mg in the future

## 2022-10-26 NOTE — TELEPHONE ENCOUNTER
----- Message from Marcella Orlando LPN sent at 10/26/2022  2:27 PM CDT -----  Regarding: FW: KRYSTAL    ----- Message -----  From: Lorie Green  Sent: 10/26/2022   2:24 PM CDT  To: Denisse Doan Staff  Subject: KRYSTAL                                              Patient would like a call back today if possible. Patient was in the e.r at [facility] due to passing out. Patient blood pressure dropped to 70/50 when he was in the hospital. Patient stated that they told him NOT to take bp meds. Last two readings were 117/76 this morning and 108/77 just now. Patient is suppose to have surgery with  tomorrow, they will call today with the time. Please call to advise

## 2022-10-30 NOTE — PROGRESS NOTES
SUBJECTIVE:      Patient ID: Jem Alvarez is a 47 y.o. male.    Chief Complaint: Sinus Problem (X 1 weel) and blood pressure check up (Pt was taken off of lisinopril in the hospital)    Patient is here today to follow up on htn. He was taking the lisinopril 10mg and he had a syncopal episode. Went to ER and treated with IV fluids. His lisinopril is on hold. He is monitoring his blood pressure at home and the highest is 139/90    Hypertension  This is a chronic problem. The current episode started more than 1 year ago. The problem is unchanged. Pertinent negatives include no chest pain, headaches, palpitations or shortness of breath. Risk factors for coronary artery disease include male gender. Past treatments include ACE inhibitors. There are no compliance problems.    Sinus Problem  This is a new problem. The current episode started 1 to 4 weeks ago. The problem is unchanged. There has been no fever. Associated symptoms include congestion and sneezing. Pertinent negatives include no chills, diaphoresis, headaches, shortness of breath or sinus pressure. Past treatments include oral decongestants. The treatment provided mild relief.     Past Surgical History:   Procedure Laterality Date    CYSTOSCOPY W/ URETERAL STENT PLACEMENT Right 10/13/2022    Procedure: CYSTOSCOPY, WITH URETERAL STENT INSERTION;  Surgeon: Tre Hutton MD;  Location: [facility];  Service: Urology;  Laterality: Right;    REPAIR OF TORSION OF TESTICLE Right     age 15    VASECTOMY       Family History   Problem Relation Age of Onset    Hypertension Unknown     No Known Problems Mother     Hypertension Father     Arthritis Father     Nephrolithiasis Father     Stroke Maternal Grandmother     No Known Problems Maternal Grandfather     Stroke Paternal Grandmother     No Known Problems Paternal Grandfather     Hodgkin's lymphoma Son         age 17    Diabetes Neg Hx       Social History     Socioeconomic History    Marital status:     Occupational History    Occupation:     Occupation: fire department   Tobacco Use    Smoking status: Former     Packs/day: 0.50     Types: Cigarettes     Start date: 2015     Quit date: 2022     Years since quittin.1    Smokeless tobacco: Never   Substance and Sexual Activity    Alcohol use: Yes     Comment: seldom    Drug use: No    Sexual activity: Yes   Social History Narrative    Live with partner     Social Determinants of Health     Financial Resource Strain: Low Risk     Difficulty of Paying Living Expenses: Not hard at all   Food Insecurity: No Food Insecurity    Worried About Running Out of Food in the Last Year: Never true    Ran Out of Food in the Last Year: Never true   Transportation Needs: No Transportation Needs    Lack of Transportation (Medical): No    Lack of Transportation (Non-Medical): No   Physical Activity: Sufficiently Active    Days of Exercise per Week: 7 days    Minutes of Exercise per Session: 60 min   Stress: No Stress Concern Present    Feeling of Stress : Not at all   Social Connections: Moderately Integrated    Frequency of Communication with Friends and Family: More than three times a week    Frequency of Social Gatherings with Friends and Family: More than three times a week    Attends Worship Services: More than 4 times per year    Active Member of Clubs or Organizations: No    Attends Club or Organization Meetings: Never    Marital Status:    Housing Stability: Unknown    Unable to Pay for Housing in the Last Year: No    Unstable Housing in the Last Year: No     Current Outpatient Medications   Medication Sig Dispense Refill    fluticasone propionate (FLONASE) 50 mcg/actuation nasal spray 2 sprays (100 mcg total) by Each Nostril route once daily. 9.9 mL 0    guaiFENesin (MUCINEX) 600 mg 12 hr tablet Take 1 tablet (600 mg total) by mouth 2 (two) times daily. for 10 days 20 tablet 0    metoprolol succinate (TOPROL-XL) 25 MG 24 hr  "tablet Take 1 tablet (25 mg total) by mouth once daily. 30 tablet 1     No current facility-administered medications for this visit.     Review of patient's allergies indicates:   Allergen Reactions    Anectine [succinylcholine chloride] Anaphylaxis     "Stopped breathing and Heart stopped" @ age 15    Anectine dalton-pack       Past Medical History:   Diagnosis Date    Anxiety     Hypertension     not on meds    Kidney stone      Past Surgical History:   Procedure Laterality Date    CYSTOSCOPY W/ URETERAL STENT PLACEMENT Right 10/13/2022    Procedure: CYSTOSCOPY, WITH URETERAL STENT INSERTION;  Surgeon: Tre Hutton MD;  Location: [facility];  Service: Urology;  Laterality: Right;    REPAIR OF TORSION OF TESTICLE Right     age 15    VASECTOMY         Review of Systems   Constitutional:  Negative for appetite change, chills, diaphoresis and unexpected weight change.   HENT:  Positive for congestion and sneezing. Negative for ear discharge, facial swelling, hearing loss, nosebleeds, sinus pressure and trouble swallowing.    Eyes:  Negative for photophobia, pain and visual disturbance.   Respiratory:  Negative for apnea, choking and shortness of breath.    Cardiovascular:  Negative for chest pain and palpitations.   Gastrointestinal:  Negative for abdominal pain, blood in stool and vomiting.   Endocrine: Negative for polyphagia.   Genitourinary:  Negative for difficulty urinating and hematuria.   Musculoskeletal:  Negative for gait problem and joint swelling.   Skin:  Negative for pallor.   Neurological:  Negative for dizziness, seizures, speech difficulty, weakness and headaches.   Hematological:  Does not bruise/bleed easily.   Psychiatric/Behavioral:  Negative for agitation, confusion, dysphoric mood, self-injury and sleep disturbance. The patient is not nervous/anxious.     OBJECTIVE:      Vitals:    10/31/22 1054   BP: 114/80   Pulse: 81   Temp: 98.4 °F (36.9 °C)   SpO2: 98%   Weight: 86.2 kg (190 lb)   Height: " "5' 6" (1.676 m)     Physical Exam  Vitals and nursing note reviewed.   Constitutional:       General: He is not in acute distress.     Appearance: He is well-developed.   HENT:      Head: Normocephalic and atraumatic.      Right Ear: Tympanic membrane normal.      Left Ear: Tympanic membrane normal.      Nose: Mucosal edema and rhinorrhea present.      Mouth/Throat:      Pharynx: Uvula midline.   Eyes:      General: Lids are normal.      Conjunctiva/sclera: Conjunctivae normal.      Pupils: Pupils are equal, round, and reactive to light.      Right eye: Pupil is round and reactive.      Left eye: Pupil is round and reactive.   Neck:      Thyroid: No thyromegaly.      Vascular: No carotid bruit.   Cardiovascular:      Rate and Rhythm: Normal rate and regular rhythm.      Pulses: Normal pulses.      Heart sounds: Normal heart sounds.   Pulmonary:      Effort: Pulmonary effort is normal.      Breath sounds: Normal breath sounds.   Abdominal:      General: Bowel sounds are normal.      Palpations: Abdomen is soft. Abdomen is not rigid.      Tenderness: There is no abdominal tenderness.   Musculoskeletal:         General: Normal range of motion.      Cervical back: Normal range of motion and neck supple.   Lymphadenopathy:      Cervical: No cervical adenopathy.   Skin:     General: Skin is warm and dry.      Nails: There is no clubbing.   Neurological:      Mental Status: He is alert and oriented to person, place, and time.   Psychiatric:         Mood and Affect: Mood normal.         Speech: Speech normal.         Behavior: Behavior normal. Behavior is cooperative.         Thought Content: Thought content normal.         Judgment: Judgment normal.    Last visit note, most recent available labs, and health maintenance reviewed  Admission on 10/25/2022, Discharged on 10/26/2022   Component Date Value Ref Range Status    WBC 10/25/2022 11.83  3.90 - 12.70 K/uL Final    RBC 10/25/2022 5.07  4.60 - 6.20 M/uL Final    " Hemoglobin 10/25/2022 15.3  14.0 - 18.0 g/dL Final    Hematocrit 10/25/2022 46.5  40.0 - 54.0 % Final    MCV 10/25/2022 92  82 - 98 fL Final    MCH 10/25/2022 30.2  27.0 - 31.0 pg Final    MCHC 10/25/2022 32.9  32.0 - 36.0 g/dL Final    RDW 10/25/2022 11.9  11.5 - 14.5 % Final    Platelets 10/25/2022 372  150 - 450 K/uL Final    MPV 10/25/2022 9.6  9.2 - 12.9 fL Final    Immature Granulocytes 10/25/2022 2.2 (H)  0.0 - 0.5 % Final    Gran # (ANC) 10/25/2022 7.9 (H)  1.8 - 7.7 K/uL Final    Immature Grans (Abs) 10/25/2022 0.26 (H)  0.00 - 0.04 K/uL Final    Comment: Mild elevation in immature granulocytes is non specific and   can be seen in a variety of conditions including stress response,   acute inflammation, trauma and pregnancy. Correlation with other   laboratory and clinical findings is essential.      Lymph # 10/25/2022 2.1  1.0 - 4.8 K/uL Final    Mono # 10/25/2022 1.4 (H)  0.3 - 1.0 K/uL Final    Eos # 10/25/2022 0.1  0.0 - 0.5 K/uL Final    Baso # 10/25/2022 0.08  0.00 - 0.20 K/uL Final    nRBC 10/25/2022 0  0 /100 WBC Final    Gran % 10/25/2022 67.0  38.0 - 73.0 % Final    Lymph % 10/25/2022 18.0  18.0 - 48.0 % Final    Mono % 10/25/2022 11.7  4.0 - 15.0 % Final    Eosinophil % 10/25/2022 0.4  0.0 - 8.0 % Final    Basophil % 10/25/2022 0.7  0.0 - 1.9 % Final    Differential Method 10/25/2022 Automated   Final    Sodium 10/25/2022 136  136 - 145 mmol/L Final    Potassium 10/25/2022 3.7  3.5 - 5.1 mmol/L Final    Chloride 10/25/2022 101  95 - 110 mmol/L Final    CO2 10/25/2022 25  23 - 29 mmol/L Final    Glucose 10/25/2022 114 (H)  70 - 110 mg/dL Final    BUN 10/25/2022 17  6 - 20 mg/dL Final    Creatinine 10/25/2022 1.3  0.5 - 1.4 mg/dL Final    Calcium 10/25/2022 9.5  8.7 - 10.5 mg/dL Final    Total Protein 10/25/2022 7.7  6.0 - 8.4 g/dL Final    Albumin 10/25/2022 4.6  3.5 - 5.2 g/dL Final    Total Bilirubin 10/25/2022 1.0  0.1 - 1.0 mg/dL Final    Comment: For infants and newborns, interpretation of  results should be based  on gestational age, weight and in agreement with clinical  observations.    Premature Infant recommended reference ranges:  Up to 24 hours.............<8.0 mg/dL  Up to 48 hours............<12.0 mg/dL  3-5 days..................<15.0 mg/dL  6-29 days.................<15.0 mg/dL      Alkaline Phosphatase 10/25/2022 69  55 - 135 U/L Final    AST 10/25/2022 21  10 - 40 U/L Final    ALT 10/25/2022 38  10 - 44 U/L Final    Anion Gap 10/25/2022 10  8 - 16 mmol/L Final    eGFR 10/25/2022 >60.0  >60 mL/min/1.73 m^2 Final    BNP 10/25/2022 6  0 - 99 pg/mL Final    Values of less than 100 pg/ml are consistent with non-CHF populations.    Troponin I 10/25/2022 <0.030  <=0.040 ng/mL Final    PT 10/25/2022 13.3  11.4 - 13.7 sec Final    INR 10/25/2022 1.1   Final    Comment: Coumadin Therapy:  INR: 2.0-3.0 conventional anticoagulation    INR: 2.5-3.5 intensive anticoagulation      Specimen UA 10/26/2022 Urine, Clean Catch   Final    Color, UA 10/26/2022 Colorless (A)  Yellow, Straw, Kim Final    Appearance, UA 10/26/2022 Clear  Clear Final    pH, UA 10/26/2022 6.0  5.0 - 8.0 Final    Specific Gravity, UA 10/26/2022 1.005  1.005 - 1.030 Final    Protein, UA 10/26/2022 Negative  Negative Final    Comment: Recommend a 24 hour urine protein or a urine   protein/creatinine ratio if globulin induced proteinuria is  clinically suspected.      Glucose, UA 10/26/2022 Negative  Negative Final    Ketones, UA 10/26/2022 Negative  Negative Final    Bilirubin (UA) 10/26/2022 Negative  Negative Final    Occult Blood UA 10/26/2022 2+ (A)  Negative Final    Nitrite, UA 10/26/2022 Negative  Negative Final    Urobilinogen, UA 10/26/2022 Negative  Negative EU/dL Final    Leukocytes, UA 10/26/2022 Trace (A)  Negative Final    RBC, UA 10/26/2022 7 (H)  0 - 4 /hpf Final    WBC, UA 10/26/2022 3  0 - 5 /hpf Final    Bacteria 10/26/2022 Negative  None-Occ /hpf Final    Squam Epithel, UA 10/26/2022 1  /hpf Final    Hyaline  REJI Paez 10/26/2022 0  0-1/lpf /lpf Final    Microscopic Comment 10/26/2022 SEE COMMENT   Final    Comment: Other formed elements not mentioned in the report are not   present in the microscopic examination.      ]  Assessment:       1. Essential hypertension    2. Nasal congestion        Plan:       Essential hypertension  - start    metoprolol succinate (TOPROL-XL) 25 MG 24 hr tablet; Take 1 tablet (25 mg total) by mouth once daily.  Dispense: 30 tablet; Refill: 1  Lisinopril was stopped    Nasal congestion  -   tart  guaiFENesin (MUCINEX) 600 mg 12 hr tablet; Take 1 tablet (600 mg total) by mouth 2 (two) times daily. for 10 days  Dispense: 20 tablet; Refill: 0  -  start   fluticasone propionate (FLONASE) 50 mcg/actuation nasal spray; 2 sprays (100 mcg total) by Each Nostril route once daily.  Dispense: 9.9 mL; Refill: 0      Follow up in about 4 weeks (around 11/28/2022) for has f/u.      10/31/2022 JOLENE Ibrahim, FNP

## 2022-10-31 ENCOUNTER — OFFICE VISIT (OUTPATIENT)
Dept: FAMILY MEDICINE | Facility: CLINIC | Age: 47
End: 2022-10-31
Payer: COMMERCIAL

## 2022-10-31 VITALS
HEIGHT: 66 IN | OXYGEN SATURATION: 98 % | HEART RATE: 81 BPM | TEMPERATURE: 98 F | BODY MASS INDEX: 30.53 KG/M2 | SYSTOLIC BLOOD PRESSURE: 114 MMHG | WEIGHT: 190 LBS | DIASTOLIC BLOOD PRESSURE: 80 MMHG

## 2022-10-31 DIAGNOSIS — R09.81 NASAL CONGESTION: ICD-10-CM

## 2022-10-31 DIAGNOSIS — I10 ESSENTIAL HYPERTENSION: Primary | ICD-10-CM

## 2022-10-31 PROCEDURE — 1160F PR REVIEW ALL MEDS BY PRESCRIBER/CLIN PHARMACIST DOCUMENTED: ICD-10-PCS | Mod: CPTII,S$GLB,, | Performed by: NURSE PRACTITIONER

## 2022-10-31 PROCEDURE — 1159F MED LIST DOCD IN RCRD: CPT | Mod: CPTII,S$GLB,, | Performed by: NURSE PRACTITIONER

## 2022-10-31 PROCEDURE — 4010F PR ACE/ARB THEARPY RXD/TAKEN: ICD-10-PCS | Mod: CPTII,S$GLB,, | Performed by: NURSE PRACTITIONER

## 2022-10-31 PROCEDURE — 3074F SYST BP LT 130 MM HG: CPT | Mod: CPTII,S$GLB,, | Performed by: NURSE PRACTITIONER

## 2022-10-31 PROCEDURE — 1159F PR MEDICATION LIST DOCUMENTED IN MEDICAL RECORD: ICD-10-PCS | Mod: CPTII,S$GLB,, | Performed by: NURSE PRACTITIONER

## 2022-10-31 PROCEDURE — 3074F PR MOST RECENT SYSTOLIC BLOOD PRESSURE < 130 MM HG: ICD-10-PCS | Mod: CPTII,S$GLB,, | Performed by: NURSE PRACTITIONER

## 2022-10-31 PROCEDURE — 4010F ACE/ARB THERAPY RXD/TAKEN: CPT | Mod: CPTII,S$GLB,, | Performed by: NURSE PRACTITIONER

## 2022-10-31 PROCEDURE — 1111F DSCHRG MED/CURRENT MED MERGE: CPT | Mod: CPTII,S$GLB,, | Performed by: NURSE PRACTITIONER

## 2022-10-31 PROCEDURE — 1111F PR DISCHARGE MEDS RECONCILED W/ CURRENT OUTPATIENT MED LIST: ICD-10-PCS | Mod: CPTII,S$GLB,, | Performed by: NURSE PRACTITIONER

## 2022-10-31 PROCEDURE — 3079F DIAST BP 80-89 MM HG: CPT | Mod: CPTII,S$GLB,, | Performed by: NURSE PRACTITIONER

## 2022-10-31 PROCEDURE — 99213 OFFICE O/P EST LOW 20 MIN: CPT | Mod: S$GLB,,, | Performed by: NURSE PRACTITIONER

## 2022-10-31 PROCEDURE — 1160F RVW MEDS BY RX/DR IN RCRD: CPT | Mod: CPTII,S$GLB,, | Performed by: NURSE PRACTITIONER

## 2022-10-31 PROCEDURE — 3079F PR MOST RECENT DIASTOLIC BLOOD PRESSURE 80-89 MM HG: ICD-10-PCS | Mod: CPTII,S$GLB,, | Performed by: NURSE PRACTITIONER

## 2022-10-31 PROCEDURE — 99213 PR OFFICE/OUTPT VISIT, EST, LEVL III, 20-29 MIN: ICD-10-PCS | Mod: S$GLB,,, | Performed by: NURSE PRACTITIONER

## 2022-10-31 RX ORDER — METOPROLOL SUCCINATE 25 MG/1
25 TABLET, EXTENDED RELEASE ORAL DAILY
Qty: 30 TABLET | Refills: 1 | Status: SHIPPED | OUTPATIENT
Start: 2022-10-31 | End: 2022-11-29 | Stop reason: SDUPTHER

## 2022-10-31 RX ORDER — FLUTICASONE PROPIONATE 50 MCG
2 SPRAY, SUSPENSION (ML) NASAL DAILY
Qty: 9.9 ML | Refills: 0 | Status: SHIPPED | OUTPATIENT
Start: 2022-10-31 | End: 2023-06-05

## 2022-10-31 RX ORDER — GUAIFENESIN 600 MG/1
600 TABLET, EXTENDED RELEASE ORAL 2 TIMES DAILY
Qty: 20 TABLET | Refills: 0 | Status: SHIPPED | OUTPATIENT
Start: 2022-10-31 | End: 2022-11-10

## 2022-11-03 ENCOUNTER — ANESTHESIA (OUTPATIENT)
Dept: SURGERY | Facility: HOSPITAL | Age: 47
End: 2022-11-03
Payer: COMMERCIAL

## 2022-11-03 ENCOUNTER — HOSPITAL ENCOUNTER (OUTPATIENT)
Facility: HOSPITAL | Age: 47
Discharge: HOME | End: 2022-11-03
Attending: SPECIALIST | Admitting: SPECIALIST
Payer: COMMERCIAL

## 2022-11-03 ENCOUNTER — ANESTHESIA EVENT (OUTPATIENT)
Dept: SURGERY | Facility: HOSPITAL | Age: 47
End: 2022-11-03
Payer: COMMERCIAL

## 2022-11-03 VITALS
HEIGHT: 66 IN | OXYGEN SATURATION: 99 % | BODY MASS INDEX: 30.7 KG/M2 | HEART RATE: 87 BPM | WEIGHT: 191 LBS | DIASTOLIC BLOOD PRESSURE: 69 MMHG | TEMPERATURE: 98 F | SYSTOLIC BLOOD PRESSURE: 120 MMHG | RESPIRATION RATE: 15 BRPM

## 2022-11-03 DIAGNOSIS — N20.1 RIGHT URETERAL STONE: ICD-10-CM

## 2022-11-03 DIAGNOSIS — N20.0 RENAL LITHIASIS: Primary | ICD-10-CM

## 2022-11-03 DIAGNOSIS — Z01.818 PRE-OP TESTING: ICD-10-CM

## 2022-11-03 DIAGNOSIS — N20.0 KIDNEY STONE: ICD-10-CM

## 2022-11-03 LAB — APTT PPP: 27.4 SEC (ref 23.3–35.1)

## 2022-11-03 PROCEDURE — 25000003 PHARM REV CODE 250: Performed by: SPECIALIST

## 2022-11-03 PROCEDURE — 27000284 HC CANNULA NASAL: Performed by: ANESTHESIOLOGY

## 2022-11-03 PROCEDURE — 36000706: Performed by: SPECIALIST

## 2022-11-03 PROCEDURE — 36000707: Performed by: SPECIALIST

## 2022-11-03 PROCEDURE — C2617 STENT, NON-COR, TEM W/O DEL: HCPCS | Performed by: SPECIALIST

## 2022-11-03 PROCEDURE — C1769 GUIDE WIRE: HCPCS | Performed by: SPECIALIST

## 2022-11-03 PROCEDURE — 71000033 HC RECOVERY, INTIAL HOUR: Performed by: SPECIALIST

## 2022-11-03 PROCEDURE — 37000009 HC ANESTHESIA EA ADD 15 MINS: Performed by: SPECIALIST

## 2022-11-03 PROCEDURE — 27000671 HC TUBING MICROBORE EXT: Performed by: ANESTHESIOLOGY

## 2022-11-03 PROCEDURE — 71000015 HC POSTOP RECOV 1ST HR: Performed by: SPECIALIST

## 2022-11-03 PROCEDURE — 27201423 OPTIME MED/SURG SUP & DEVICES STERILE SUPPLY: Performed by: SPECIALIST

## 2022-11-03 PROCEDURE — 27000673 HC TUBING BLOOD Y: Performed by: ANESTHESIOLOGY

## 2022-11-03 PROCEDURE — 63600175 PHARM REV CODE 636 W HCPCS: Performed by: NURSE ANESTHETIST, CERTIFIED REGISTERED

## 2022-11-03 PROCEDURE — 63600175 PHARM REV CODE 636 W HCPCS: Performed by: SPECIALIST

## 2022-11-03 PROCEDURE — 25000003 PHARM REV CODE 250: Performed by: NURSE ANESTHETIST, CERTIFIED REGISTERED

## 2022-11-03 PROCEDURE — 27202107 HC XP QUATRO SENSOR: Performed by: ANESTHESIOLOGY

## 2022-11-03 PROCEDURE — 37000008 HC ANESTHESIA 1ST 15 MINUTES: Performed by: SPECIALIST

## 2022-11-03 PROCEDURE — 82360 CALCULUS ASSAY QUANT: CPT | Performed by: SPECIALIST

## 2022-11-03 PROCEDURE — 27200651 HC AIRWAY, LMA: Performed by: ANESTHESIOLOGY

## 2022-11-03 PROCEDURE — C1773 RET DEV, INSERTABLE: HCPCS | Performed by: SPECIALIST

## 2022-11-03 PROCEDURE — 85730 THROMBOPLASTIN TIME PARTIAL: CPT | Performed by: SPECIALIST

## 2022-11-03 DEVICE — STENT URETERAL FIRM 4.8FX26 ASCERTA: Type: IMPLANTABLE DEVICE | Site: URETER | Status: FUNCTIONAL

## 2022-11-03 RX ORDER — DIPHENHYDRAMINE HYDROCHLORIDE 50 MG/ML
12.5 INJECTION INTRAMUSCULAR; INTRAVENOUS
Status: DISCONTINUED | OUTPATIENT
Start: 2022-11-03 | End: 2022-11-03 | Stop reason: HOSPADM

## 2022-11-03 RX ORDER — CEFAZOLIN SODIUM 2 G/50ML
2 SOLUTION INTRAVENOUS ONCE
Status: COMPLETED | OUTPATIENT
Start: 2022-11-03 | End: 2022-11-03

## 2022-11-03 RX ORDER — OXYCODONE HYDROCHLORIDE 5 MG/1
5 TABLET ORAL
Status: DISCONTINUED | OUTPATIENT
Start: 2022-11-03 | End: 2022-11-03 | Stop reason: HOSPADM

## 2022-11-03 RX ORDER — SULFAMETHOXAZOLE AND TRIMETHOPRIM 800; 160 MG/1; MG/1
1 TABLET ORAL DAILY
Qty: 7 TABLET | Refills: 0
Start: 2022-11-03 | End: 2022-11-29

## 2022-11-03 RX ORDER — ONDANSETRON 2 MG/ML
4 INJECTION INTRAMUSCULAR; INTRAVENOUS DAILY PRN
Status: DISCONTINUED | OUTPATIENT
Start: 2022-11-03 | End: 2022-11-03 | Stop reason: HOSPADM

## 2022-11-03 RX ORDER — HYDROCODONE BITARTRATE AND ACETAMINOPHEN 5; 325 MG/1; MG/1
1 TABLET ORAL EVERY 6 HOURS PRN
Qty: 12 TABLET | Refills: 0
Start: 2022-11-03 | End: 2022-11-29

## 2022-11-03 RX ORDER — SODIUM CHLORIDE, SODIUM LACTATE, POTASSIUM CHLORIDE, CALCIUM CHLORIDE 600; 310; 30; 20 MG/100ML; MG/100ML; MG/100ML; MG/100ML
INJECTION, SOLUTION INTRAVENOUS CONTINUOUS PRN
Status: DISCONTINUED | OUTPATIENT
Start: 2022-11-03 | End: 2022-11-03

## 2022-11-03 RX ORDER — FENTANYL CITRATE 50 UG/ML
INJECTION, SOLUTION INTRAMUSCULAR; INTRAVENOUS
Status: DISCONTINUED | OUTPATIENT
Start: 2022-11-03 | End: 2022-11-03

## 2022-11-03 RX ORDER — FAMOTIDINE 10 MG/ML
INJECTION INTRAVENOUS
Status: DISCONTINUED | OUTPATIENT
Start: 2022-11-03 | End: 2022-11-03

## 2022-11-03 RX ORDER — HYDROMORPHONE HYDROCHLORIDE 1 MG/ML
0.2 INJECTION, SOLUTION INTRAMUSCULAR; INTRAVENOUS; SUBCUTANEOUS EVERY 5 MIN PRN
Status: DISCONTINUED | OUTPATIENT
Start: 2022-11-03 | End: 2022-11-03 | Stop reason: HOSPADM

## 2022-11-03 RX ORDER — PROPOFOL 10 MG/ML
VIAL (ML) INTRAVENOUS
Status: DISCONTINUED | OUTPATIENT
Start: 2022-11-03 | End: 2022-11-03

## 2022-11-03 RX ORDER — LIDOCAINE HYDROCHLORIDE 20 MG/ML
JELLY TOPICAL
Status: DISCONTINUED | OUTPATIENT
Start: 2022-11-03 | End: 2022-11-03 | Stop reason: HOSPADM

## 2022-11-03 RX ORDER — LIDOCAINE HYDROCHLORIDE 20 MG/ML
INJECTION, SOLUTION EPIDURAL; INFILTRATION; INTRACAUDAL; PERINEURAL
Status: DISCONTINUED | OUTPATIENT
Start: 2022-11-03 | End: 2022-11-03

## 2022-11-03 RX ORDER — ACETAMINOPHEN 10 MG/ML
INJECTION, SOLUTION INTRAVENOUS
Status: DISCONTINUED | OUTPATIENT
Start: 2022-11-03 | End: 2022-11-03

## 2022-11-03 RX ORDER — PHENYLEPHRINE HYDROCHLORIDE 10 MG/ML
INJECTION INTRAVENOUS
Status: DISCONTINUED | OUTPATIENT
Start: 2022-11-03 | End: 2022-11-03

## 2022-11-03 RX ORDER — MIDAZOLAM HYDROCHLORIDE 1 MG/ML
INJECTION INTRAMUSCULAR; INTRAVENOUS
Status: DISCONTINUED | OUTPATIENT
Start: 2022-11-03 | End: 2022-11-03

## 2022-11-03 RX ORDER — ONDANSETRON 2 MG/ML
INJECTION INTRAMUSCULAR; INTRAVENOUS
Status: DISCONTINUED | OUTPATIENT
Start: 2022-11-03 | End: 2022-11-03

## 2022-11-03 RX ORDER — MEPERIDINE HYDROCHLORIDE 50 MG/ML
12.5 INJECTION INTRAMUSCULAR; INTRAVENOUS; SUBCUTANEOUS EVERY 10 MIN PRN
Status: DISCONTINUED | OUTPATIENT
Start: 2022-11-03 | End: 2022-11-03 | Stop reason: HOSPADM

## 2022-11-03 RX ADMIN — ONDANSETRON 4 MG: 2 INJECTION INTRAMUSCULAR; INTRAVENOUS at 10:11

## 2022-11-03 RX ADMIN — MIDAZOLAM HYDROCHLORIDE 2 MG: 1 INJECTION, SOLUTION INTRAMUSCULAR; INTRAVENOUS at 10:11

## 2022-11-03 RX ADMIN — LIDOCAINE HYDROCHLORIDE 50 MG: 20 INJECTION, SOLUTION INTRAVENOUS at 10:11

## 2022-11-03 RX ADMIN — PROPOFOL 200 MG: 10 INJECTION, EMULSION INTRAVENOUS at 10:11

## 2022-11-03 RX ADMIN — PHENYLEPHRINE HYDROCHLORIDE 100 MCG: 10 INJECTION INTRAVENOUS at 10:11

## 2022-11-03 RX ADMIN — SODIUM CHLORIDE, SODIUM LACTATE, POTASSIUM CHLORIDE, AND CALCIUM CHLORIDE: .6; .31; .03; .02 INJECTION, SOLUTION INTRAVENOUS at 10:11

## 2022-11-03 RX ADMIN — ACETAMINOPHEN 1000 MG: 10 INJECTION, SOLUTION INTRAVENOUS at 10:11

## 2022-11-03 RX ADMIN — FENTANYL CITRATE 50 MCG: 50 INJECTION INTRAMUSCULAR; INTRAVENOUS at 10:11

## 2022-11-03 RX ADMIN — CEFAZOLIN SODIUM 2 G: 2 SOLUTION INTRAVENOUS at 10:11

## 2022-11-03 RX ADMIN — FENTANYL CITRATE 50 MCG: 50 INJECTION INTRAMUSCULAR; INTRAVENOUS at 11:11

## 2022-11-03 RX ADMIN — FAMOTIDINE 20 MG: 10 INJECTION, SOLUTION INTRAVENOUS at 10:11

## 2022-11-03 NOTE — ANESTHESIA PREPROCEDURE EVALUATION
11/03/2022  Jem Alvarez is a 47 y.o., male.    Patient Active Problem List   Diagnosis    Precordial pain    Anxiety    Smoking    Renal lithiasis    Hydronephrosis with urinary obstruction due to renal calculus    Primary hypertension       Past Surgical History:   Procedure Laterality Date    CYSTOSCOPY W/ URETERAL STENT PLACEMENT Right 10/13/2022    Procedure: CYSTOSCOPY, WITH URETERAL STENT INSERTION;  Surgeon: Tre Hutton MD;  Location: [facility];  Service: Urology;  Laterality: Right;    REPAIR OF TORSION OF TESTICLE Right     age 15    VASECTOMY          Tobacco Use:  The patient  reports that he quit smoking about 2 months ago. His smoking use included cigarettes. He started smoking about 7 years ago. He smoked an average of .5 packs per day. He has never used smokeless tobacco.     Results for orders placed or performed during the hospital encounter of 10/25/22   EKG 12-lead    Collection Time: 10/25/22  8:44 PM    Narrative    Test Reason : R07.9,    Vent. Rate : 095 BPM     Atrial Rate : 095 BPM     P-R Int : 138 ms          QRS Dur : 086 ms      QT Int : 338 ms       P-R-T Axes : 059 060 003 degrees     QTc Int : 424 ms    Normal sinus rhythm  Nonspecific T wave abnormality  Abnormal ECG  When compared with ECG of 14-JAN-2020 11:02,  No significant change was found  Confirmed by Stiven DURAND, Alex DUNN ([id number]) on 10/26/2022 9:03:41 PM    Referred By: IZZY   SELF           Confirmed By:Alex Saleem MD             Lab Results   Component Value Date    WBC 11.83 10/25/2022    HGB 15.3 10/25/2022    HCT 46.5 10/25/2022    MCV 92 10/25/2022     10/25/2022     BMP  Lab Results   Component Value Date     10/25/2022    K 3.7 10/25/2022     10/25/2022    CO2 25 10/25/2022    BUN 17 10/25/2022    CREATININE 1.3 10/25/2022    CALCIUM 9.5 10/25/2022    ANIONGAP 10  10/25/2022     (H) 10/25/2022    GLU 76 10/18/2022    GLU 93 10/14/2022       No results found for this or any previous visit.            Pre-op Assessment    I have reviewed the Patient Summary Reports.     I have reviewed the Nursing Notes. I have reviewed the NPO Status.   I have reviewed the Medications.     Review of Systems  Anesthesia Hx:  No problems with previous Anesthesia  Denies Family Hx of Anesthesia complications.   Denies Personal Hx of Anesthesia complications.   Social:  Non-Smoker    Hematology/Oncology:  Hematology Normal        EENT/Dental:EENT/Dental Normal   Cardiovascular:   Hypertension  Other Cardiac Conditions History of syncope while eating at a restaurant last month.  Workup negative.  Patient feels back to himself.  Pulmonary:  Pulmonary Normal    Renal/:   Chronic Renal Disease renal calculi    Hepatic/GI:  Hepatic/GI Normal    Musculoskeletal:  Musculoskeletal Normal    Neurological:  Neurology Normal    Endocrine:  Endocrine Normal    Psych:   anxiety          Physical Exam  General: Well nourished    Airway:  Mallampati: III / II  Mouth Opening: Normal  TM Distance: Normal  Tongue: Normal  Neck ROM: Normal ROM    Dental:  Intact    Chest/Lungs:  Clear to auscultation, Normal Respiratory Rate    Heart:  Rate: Normal  Rhythm: Regular Rhythm        Anesthesia Plan  Type of Anesthesia, risks & benefits discussed:    Anesthesia Type: Gen Supraglottic Airway  Intra-op Monitoring Plan: Standard ASA Monitors  Post Op Pain Control Plan: IV/PO Opioids PRN and multimodal analgesia  Induction:  IV  Airway Plan: Video  Informed Consent: Informed consent signed with the Patient and all parties understand the risks and agree with anesthesia plan.  All questions answered.   ASA Score: 2  Anesthesia Plan Notes: LMA  Consider glycopyrrolate before procedure.  No succinylcholine (patient had asystole when he was 15).  Multimodal analgesia with ofirmev 1000mg, decadron 8 mg.  PONV prophylaxis  with Pepcid 20 mg IV, and Zofran 4 mg IV.      Ready For Surgery From Anesthesia Perspective.     .

## 2022-11-03 NOTE — OP NOTE
Operative Note         SUMMARY     Surgery Date:  11/3/2022     Assistant:     Indications:  47-year-old male with right ureteral stone[radiolucent]  Here for stone extraction      Pre-op Diagnosis:   Right distal ureteral stone    Post-op Diagnosis:  Same    Procedure:  Right ureteroscopy with stone extraction and stent replacement    Anesthesia: General    Description of Procedure:   Patient brought to cystoscopy suite.  Placed in the cystoscopy position.  Patient is prepped and draped.  Anesthesia is given.  We enter the urinary bladder with the 21 fr cystoscope.   We removed the stent with a grasper  We easily float a Glidewire into the right ureter  We used our semi rigid ureteral scope to gain access to the distal ureter  The stone is identified and we were able to removed with a 3 wire basket  At the end of the case I placed a 5 [place]awk stent over the wire  Telescope was removed and he is brought to recovery in good condition    Findings/Key Components:          Estimated Blood Loss:   None

## 2022-11-03 NOTE — DISCHARGE SUMMARY
Patient comes in for outpatient ureteroscopic stone extraction    Procedure is done w no complication  Patient will go home with a stent and plan a follow-up next week  After a brief stay in recovery, patient is discharged in good condition    Meds given:  Lortab Bactrim    F/u office:  1 week with LILIANA

## 2022-11-03 NOTE — H&P
"[facility]  History & Physical    SUBJECTIVE:     Chief Complaint/Reason for Admission:     History of Present Illness:  Patient is a 47 y.o. male presents with a radiolucent large right distal ureteral stone  Here for ureteroscopy with stone basket    PTA Medications   Medication Sig    fluticasone propionate (FLONASE) 50 mcg/actuation nasal spray 2 sprays (100 mcg total) by Each Nostril route once daily.    guaiFENesin (MUCINEX) 600 mg 12 hr tablet Take 1 tablet (600 mg total) by mouth 2 (two) times daily. for 10 days    metoprolol succinate (TOPROL-XL) 25 MG 24 hr tablet Take 1 tablet (25 mg total) by mouth once daily.       Review of patient's allergies indicates:   Allergen Reactions    Anectine [succinylcholine chloride] Anaphylaxis     "Stopped breathing and Heart stopped" @ age 15  This is NOT a malignant hyperthermia situation.    Anectine dalton-pack        Past Medical History:   Diagnosis Date    Anxiety     Hypertension     not on meds    Kidney stone      Past Surgical History:   Procedure Laterality Date    CYSTOSCOPY W/ URETERAL STENT PLACEMENT Right 10/13/2022    Procedure: CYSTOSCOPY, WITH URETERAL STENT INSERTION;  Surgeon: Tre Hutton MD;  Location: [facility];  Service: Urology;  Laterality: Right;    REPAIR OF TORSION OF TESTICLE Right     age 15    VASECTOMY       Family History   Problem Relation Age of Onset    Hypertension Unknown     No Known Problems Mother     Hypertension Father     Arthritis Father     Nephrolithiasis Father     Stroke Maternal Grandmother     No Known Problems Maternal Grandfather     Stroke Paternal Grandmother     No Known Problems Paternal Grandfather     Hodgkin's lymphoma Son         age 17    Diabetes Neg Hx      Social History     Tobacco Use    Smoking status: Former     Packs/day: 0.50     Types: Cigarettes     Start date: 2015     Quit date: 2022     Years since quittin.1    Smokeless tobacco: Never   Substance Use Topics    " Alcohol use: Yes     Comment: seldom    Drug use: No        Review of Systems:    Negative  OBJECTIVE:     Vital Signs (Most Recent):  Temp: 97.8 °F (36.6 °C) (11/03/22 0935)  Pulse: 87 (11/03/22 0935)  Resp: 17 (11/03/22 0935)  BP: (!) 139/99 (11/03/22 0935)  SpO2: 100 % (11/03/22 0935)    Inpatient Medications:  Current Facility-Administered Medications   Medication Dose Route Frequency Provider Last Rate Last Admin    cefazolin (ANCEF) 2 gram in dextrose 5% 50 mL IVPB (premix)  2 g Intravenous Once Tre Hutton MD              ASSESSMENT/PLAN:       Right distal ureteral stone    Ureteroscopy with holmium laser lithotripsy and stone basket

## 2022-11-03 NOTE — ANESTHESIA POSTPROCEDURE EVALUATION
Anesthesia Post Evaluation    Patient: Jem Alvarez    Procedure(s) Performed: Procedure(s) (LRB):  URETEROSCOPY (Right)  CYSTOSCOPY (N/A)  EXTRACTION - STONE (Right)  INSERTION, CATHETER, URETER (Right)    Final Anesthesia Type: general      Patient location during evaluation: PACU  Patient participation: Yes- Able to Participate  Level of consciousness: awake and alert  Post-procedure vital signs: reviewed and stable  Pain management: adequate  Airway patency: patent    PONV status at discharge: No PONV  Anesthetic complications: no      Cardiovascular status: blood pressure returned to baseline and stable  Respiratory status: unassisted and room air  Hydration status: euvolemic  Follow-up not needed.          Vitals Value Taken Time   /82 11/03/22 1234   Temp 36.7 °C (98 °F) 11/03/22 1106   Pulse 84 11/03/22 1234   Resp 24 11/03/22 1234   SpO2 89 % 11/03/22 1234   Vitals shown include unvalidated device data.      No case tracking events are documented in the log.      Pain/Carter Score: Carter Score: 10 (11/3/2022 11:30 AM)

## 2022-11-03 NOTE — TRANSFER OF CARE
"Anesthesia Transfer of Care Note    Patient: Jem Alvarez    Procedure(s) Performed: Procedure(s) (LRB):  URETEROSCOPY (Right)  CYSTOSCOPY (N/A)  EXTRACTION - STONE using 3 wire basket (Right)  INSERTION, CATHETER, URETER (Right)    Patient location: PACU    Anesthesia Type: general    Transport from OR: Transported from OR on room air with adequate spontaneous ventilation    Post pain: adequate analgesia    Post assessment: no apparent anesthetic complications    Post vital signs: stable    Level of consciousness: awake and alert    Nausea/Vomiting: no nausea/vomiting    Complications: none    Transfer of care protocol was followed      Last vitals:   Visit Vitals  BP (!) 139/99 (BP Location: Right arm, Patient Position: Lying)   Pulse 87   Temp 36.6 °C (97.8 °F) (Oral)   Resp 17   Ht 5' 6" (1.676 m)   Wt 86.6 kg (191 lb)   SpO2 100%   BMI 30.83 kg/m²     "

## 2022-11-07 DIAGNOSIS — N20.1 CALCULUS OF URETER: Primary | ICD-10-CM

## 2022-11-09 LAB
CALCIUM OXALATE DIHYDRATE MFR STONE IR: 30 %
CALCULI COMPOSITION: NORMAL
CALCULI DISCLAIMER: NORMAL
CALCULI, PLEASE NOTE: NORMAL
COLOR STONE: NORMAL
COM MFR STONE: 70 %
LABORATORY COMMENT REPORT: NORMAL
LABORATORY COMMENT REPORT: NORMAL
PHOTO: NORMAL
SIZE STONE: NORMAL MM
WT STONE: 11 MG

## 2022-11-14 ENCOUNTER — HOSPITAL ENCOUNTER (OUTPATIENT)
Dept: RADIOLOGY | Facility: HOSPITAL | Age: 47
Discharge: HOME | End: 2022-11-14
Attending: SPECIALIST
Payer: COMMERCIAL

## 2022-11-14 DIAGNOSIS — N20.1 CALCULUS OF URETER: ICD-10-CM

## 2022-11-14 PROCEDURE — 74018 RADEX ABDOMEN 1 VIEW: CPT | Mod: TC

## 2022-11-18 ENCOUNTER — TELEPHONE (OUTPATIENT)
Dept: FAMILY MEDICINE | Facility: CLINIC | Age: 47
End: 2022-11-18

## 2022-11-18 NOTE — TELEPHONE ENCOUNTER
Pt called and said he started taking metoprolol as prescribed. Last night his BP was 150/100, took a second one and it brought it back down and he felt better. He was inquiring if he should take a second dose. He also said kidney stone was removed last week and stent today.

## 2022-11-21 NOTE — TELEPHONE ENCOUNTER
Pt said his HR has been in the 80's. Instructed to take 2 toprol. Will call in a week with an update.

## 2022-11-29 ENCOUNTER — OFFICE VISIT (OUTPATIENT)
Dept: FAMILY MEDICINE | Facility: CLINIC | Age: 47
End: 2022-11-29
Payer: COMMERCIAL

## 2022-11-29 VITALS
SYSTOLIC BLOOD PRESSURE: 112 MMHG | DIASTOLIC BLOOD PRESSURE: 62 MMHG | OXYGEN SATURATION: 95 % | HEIGHT: 66 IN | HEART RATE: 84 BPM | BODY MASS INDEX: 30.51 KG/M2 | WEIGHT: 189.81 LBS | TEMPERATURE: 98 F

## 2022-11-29 DIAGNOSIS — I10 ESSENTIAL HYPERTENSION: Primary | ICD-10-CM

## 2022-11-29 DIAGNOSIS — Z12.11 SCREEN FOR COLON CANCER: ICD-10-CM

## 2022-11-29 PROCEDURE — 99213 PR OFFICE/OUTPT VISIT, EST, LEVL III, 20-29 MIN: ICD-10-PCS | Mod: S$GLB,,, | Performed by: NURSE PRACTITIONER

## 2022-11-29 PROCEDURE — 4010F ACE/ARB THERAPY RXD/TAKEN: CPT | Mod: CPTII,S$GLB,, | Performed by: NURSE PRACTITIONER

## 2022-11-29 PROCEDURE — 1160F PR REVIEW ALL MEDS BY PRESCRIBER/CLIN PHARMACIST DOCUMENTED: ICD-10-PCS | Mod: CPTII,S$GLB,, | Performed by: NURSE PRACTITIONER

## 2022-11-29 PROCEDURE — 1159F MED LIST DOCD IN RCRD: CPT | Mod: CPTII,S$GLB,, | Performed by: NURSE PRACTITIONER

## 2022-11-29 PROCEDURE — 3008F PR BODY MASS INDEX (BMI) DOCUMENTED: ICD-10-PCS | Mod: CPTII,S$GLB,, | Performed by: NURSE PRACTITIONER

## 2022-11-29 PROCEDURE — 1160F RVW MEDS BY RX/DR IN RCRD: CPT | Mod: CPTII,S$GLB,, | Performed by: NURSE PRACTITIONER

## 2022-11-29 PROCEDURE — 99213 OFFICE O/P EST LOW 20 MIN: CPT | Mod: S$GLB,,, | Performed by: NURSE PRACTITIONER

## 2022-11-29 PROCEDURE — 3078F PR MOST RECENT DIASTOLIC BLOOD PRESSURE < 80 MM HG: ICD-10-PCS | Mod: CPTII,S$GLB,, | Performed by: NURSE PRACTITIONER

## 2022-11-29 PROCEDURE — 3008F BODY MASS INDEX DOCD: CPT | Mod: CPTII,S$GLB,, | Performed by: NURSE PRACTITIONER

## 2022-11-29 PROCEDURE — 3074F PR MOST RECENT SYSTOLIC BLOOD PRESSURE < 130 MM HG: ICD-10-PCS | Mod: CPTII,S$GLB,, | Performed by: NURSE PRACTITIONER

## 2022-11-29 PROCEDURE — 3078F DIAST BP <80 MM HG: CPT | Mod: CPTII,S$GLB,, | Performed by: NURSE PRACTITIONER

## 2022-11-29 PROCEDURE — 1159F PR MEDICATION LIST DOCUMENTED IN MEDICAL RECORD: ICD-10-PCS | Mod: CPTII,S$GLB,, | Performed by: NURSE PRACTITIONER

## 2022-11-29 PROCEDURE — 3074F SYST BP LT 130 MM HG: CPT | Mod: CPTII,S$GLB,, | Performed by: NURSE PRACTITIONER

## 2022-11-29 PROCEDURE — 4010F PR ACE/ARB THEARPY RXD/TAKEN: ICD-10-PCS | Mod: CPTII,S$GLB,, | Performed by: NURSE PRACTITIONER

## 2022-11-29 RX ORDER — METOPROLOL SUCCINATE 25 MG/1
25 TABLET, EXTENDED RELEASE ORAL DAILY
Qty: 90 TABLET | Refills: 1 | Status: SHIPPED | OUTPATIENT
Start: 2022-11-29 | End: 2023-06-05 | Stop reason: SDUPTHER

## 2022-11-29 NOTE — PROGRESS NOTES
SUBJECTIVE:      Patient ID: Jem Alvarez is a 47 y.o. male.    Chief Complaint: Follow-up, Hypertension, and Otalgia    Patient is here today to follow up on htn. He was started on toprol. Tolerating it well. Taking 25mg daily, bp is improved    Hypertension  This is a chronic problem. The current episode started more than 1 year ago. The problem is unchanged. Pertinent negatives include no chest pain, headaches, palpitations or shortness of breath. Risk factors for coronary artery disease include male gender. Past treatments include ACE inhibitors. There are no compliance problems.      Past Surgical History:   Procedure Laterality Date    CYSTOSCOPY N/A 11/3/2022    Procedure: CYSTOSCOPY;  Surgeon: Tre Hutton MD;  Location: [facility] OR;  Service: Urology;  Laterality: N/A;    CYSTOSCOPY W/ URETERAL STENT PLACEMENT Right 10/13/2022    Procedure: CYSTOSCOPY, WITH URETERAL STENT INSERTION;  Surgeon: Tre Hutton MD;  Location: [facility] OR;  Service: Urology;  Laterality: Right;    INSERTION OF URETERAL CATHETER Right 11/3/2022    Procedure: INSERTION, CATHETER, URETER;  Surgeon: Tre Hutton MD;  Location: [facility] OR;  Service: Urology;  Laterality: Right;    REPAIR OF TORSION OF TESTICLE Right     age 15    URETEROSCOPY Right 11/3/2022    Procedure: URETEROSCOPY;  Surgeon: Tre Hutton MD;  Location: [facility] OR;  Service: Urology;  Laterality: Right;    VASECTOMY       Family History   Problem Relation Age of Onset    Hypertension Unknown     No Known Problems Mother     Hypertension Father     Arthritis Father     Nephrolithiasis Father     Stroke Maternal Grandmother     No Known Problems Maternal Grandfather     Stroke Paternal Grandmother     No Known Problems Paternal Grandfather     Hodgkin's lymphoma Son         age 17    Diabetes Neg Hx       Social History     Socioeconomic History    Marital status:    Occupational History    Occupation:      Occupation: fire department   Tobacco Use    Smoking status: Former     Packs/day: 0.50     Types: Cigarettes     Start date: 2015     Quit date: 2022     Years since quittin.2    Smokeless tobacco: Never   Substance and Sexual Activity    Alcohol use: Yes     Comment: seldom    Drug use: No    Sexual activity: Yes   Social History Narrative    Live with partner     Social Determinants of Health     Financial Resource Strain: Low Risk     Difficulty of Paying Living Expenses: Not hard at all   Food Insecurity: No Food Insecurity    Worried About Running Out of Food in the Last Year: Never true    Ran Out of Food in the Last Year: Never true   Transportation Needs: No Transportation Needs    Lack of Transportation (Medical): No    Lack of Transportation (Non-Medical): No   Physical Activity: Sufficiently Active    Days of Exercise per Week: 7 days    Minutes of Exercise per Session: 60 min   Stress: No Stress Concern Present    Feeling of Stress : Not at all   Social Connections: Moderately Integrated    Frequency of Communication with Friends and Family: More than three times a week    Frequency of Social Gatherings with Friends and Family: More than three times a week    Attends [employer] Services: More than 4 times per year    Active Member of Clubs or Organizations: No    Attends Club or Organization Meetings: Never    Marital Status:    Housing Stability: Unknown    Unable to Pay for Housing in the Last Year: No    Unstable Housing in the Last Year: No     Current Outpatient Medications   Medication Sig Dispense Refill    fluticasone propionate (FLONASE) 50 mcg/actuation nasal spray 2 sprays (100 mcg total) by Each Nostril route once daily. 9.9 mL 0    metoprolol succinate (TOPROL-XL) 25 MG 24 hr tablet Take 1 tablet (25 mg total) by mouth once daily. 90 tablet 1     No current facility-administered medications for this visit.     Review of patient's allergies indicates:   Allergen Reactions     "Anectine [succinylcholine chloride] Anaphylaxis     "Stopped breathing and Heart stopped" @ age 15  This is NOT a malignant hyperthermia situation.    Anectine dalton-pack       Past Medical History:   Diagnosis Date    Anxiety     Hypertension     not on meds    Kidney stone      Past Surgical History:   Procedure Laterality Date    CYSTOSCOPY N/A 11/3/2022    Procedure: CYSTOSCOPY;  Surgeon: Tre Hutton MD;  Location: [facility] OR;  Service: Urology;  Laterality: N/A;    CYSTOSCOPY W/ URETERAL STENT PLACEMENT Right 10/13/2022    Procedure: CYSTOSCOPY, WITH URETERAL STENT INSERTION;  Surgeon: Tre Hutton MD;  Location: [facility] OR;  Service: Urology;  Laterality: Right;    INSERTION OF URETERAL CATHETER Right 11/3/2022    Procedure: INSERTION, CATHETER, URETER;  Surgeon: Tre Hutton MD;  Location: [facility] OR;  Service: Urology;  Laterality: Right;    REPAIR OF TORSION OF TESTICLE Right     age 15    URETEROSCOPY Right 11/3/2022    Procedure: URETEROSCOPY;  Surgeon: Tre Hutton MD;  Location: [facility];  Service: Urology;  Laterality: Right;    VASECTOMY         Review of Systems   Constitutional:  Negative for appetite change, chills, diaphoresis and unexpected weight change.   HENT:  Negative for ear discharge, facial swelling, hearing loss, nosebleeds and trouble swallowing.    Eyes:  Negative for photophobia, pain and visual disturbance.   Respiratory:  Negative for apnea, choking, shortness of breath and wheezing.    Cardiovascular:  Negative for chest pain and palpitations.   Gastrointestinal:  Negative for abdominal pain, blood in stool and vomiting.   Endocrine: Negative for polyphagia.   Genitourinary:  Negative for difficulty urinating and hematuria.   Musculoskeletal:  Negative for gait problem and joint swelling.   Skin:  Negative for pallor.   Neurological:  Negative for dizziness, seizures, speech difficulty, weakness, light-headedness and headaches.   Hematological:  Does not " "bruise/bleed easily.   Psychiatric/Behavioral:  Negative for agitation, confusion, dysphoric mood, self-injury, sleep disturbance and suicidal ideas. The patient is not nervous/anxious.     OBJECTIVE:      Vitals:    11/29/22 1422   BP: 112/62   BP Location: Right arm   Patient Position: Sitting   Pulse: 84   Temp: 98.2 °F (36.8 °C)   SpO2: 95%   Weight: 86.1 kg (189 lb 12.8 oz)   Height: 5' 6" (1.676 m)     Physical Exam  Vitals and nursing note reviewed.   Constitutional:       General: He is not in acute distress.     Appearance: He is well-developed.   HENT:      Head: Normocephalic and atraumatic.      Nose: Nose normal.      Mouth/Throat:      Pharynx: Uvula midline.   Eyes:      General: Lids are normal.      Conjunctiva/sclera: Conjunctivae normal.      Pupils: Pupils are equal, round, and reactive to light.      Right eye: Pupil is round and reactive.      Left eye: Pupil is round and reactive.   Neck:      Thyroid: No thyromegaly.      Vascular: No carotid bruit.   Cardiovascular:      Rate and Rhythm: Normal rate and regular rhythm.      Pulses: Normal pulses.      Heart sounds: Normal heart sounds.   Pulmonary:      Effort: Pulmonary effort is normal.      Breath sounds: Normal breath sounds.   Abdominal:      General: Bowel sounds are normal.      Palpations: Abdomen is soft. Abdomen is not rigid.      Tenderness: There is no abdominal tenderness.   Musculoskeletal:         General: Normal range of motion.      Cervical back: Normal range of motion and neck supple.   Lymphadenopathy:      Cervical: No cervical adenopathy.   Skin:     General: Skin is warm and dry.      Nails: There is no clubbing.   Neurological:      Mental Status: He is alert and oriented to person, place, and time.   Psychiatric:         Mood and Affect: Mood normal.         Speech: Speech normal.         Behavior: Behavior normal. Behavior is cooperative.         Thought Content: Thought content normal.      Assessment:       1. " Essential hypertension    2. Screen for colon cancer        Plan:       Essential hypertension  -     metoprolol succinate (TOPROL-XL) 25 MG 24 hr tablet; Take 1 tablet (25 mg total) by mouth once daily.  Dispense: 90 tablet; Refill: 1    Screen for colon cancer  -     Ambulatory referral/consult to Gastroenterology; Future; Expected date: 11/30/2022      Follow up in about 6 months (around 5/29/2023) for htn.      11/29/2022 JOLENE Ibrahim, FNP

## 2022-12-02 ENCOUNTER — TELEPHONE (OUTPATIENT)
Dept: GASTROENTEROLOGY | Facility: CLINIC | Age: 47
End: 2022-12-02
Payer: COMMERCIAL

## 2022-12-02 DIAGNOSIS — Z12.11 SCREENING FOR COLON CANCER: Primary | ICD-10-CM

## 2022-12-02 NOTE — TELEPHONE ENCOUNTER
Colonoscopy 1/31 at 8am arrive for 7am  instructions reviewed and patient states understanding. Copy to portal.

## 2022-12-07 DIAGNOSIS — N13.2 HYDRONEPHROSIS WITH RENAL AND URETERAL CALCULOUS OBSTRUCTION: Primary | ICD-10-CM

## 2022-12-19 DIAGNOSIS — M25.512 PAIN IN LEFT SHOULDER: Primary | ICD-10-CM

## 2023-01-10 ENCOUNTER — HOSPITAL ENCOUNTER (OUTPATIENT)
Dept: RADIOLOGY | Facility: HOSPITAL | Age: 48
Discharge: HOME | End: 2023-01-10
Attending: SPECIALIST
Payer: COMMERCIAL

## 2023-01-10 DIAGNOSIS — N13.2 HYDRONEPHROSIS WITH RENAL AND URETERAL CALCULOUS OBSTRUCTION: ICD-10-CM

## 2023-01-10 PROCEDURE — 76770 US EXAM ABDO BACK WALL COMP: CPT | Mod: TC,PO

## 2023-01-30 ENCOUNTER — TELEPHONE (OUTPATIENT)
Dept: SURGERY | Facility: CLINIC | Age: 48
End: 2023-01-30
Payer: COMMERCIAL

## 2023-01-30 NOTE — TELEPHONE ENCOUNTER
----- Message from Luisana Dodge sent at 1/30/2023  2:24 PM CST -----  Regarding: cancellation  Contact: patient  Type:  Sooner Appointment Request    Caller is requesting a sooner appointment.  Caller declined first available appointment listed below.  Caller will not accept being placed on the waitlist and is requesting a message be sent to doctor.    Name of Caller:  Patient  When is the first available appointment?    Symptoms:  cancellation  Best Call Back Number:  [phone] (home)     Additional Information:  Please call patient to cancel his appt for tomorrow. Thanks!

## 2023-01-30 NOTE — TELEPHONE ENCOUNTER
Patient cancelled colonoscopy for tomorrow he woke up sick this morning did not want to reschedule at this time.

## 2023-06-05 ENCOUNTER — OFFICE VISIT (OUTPATIENT)
Dept: FAMILY MEDICINE | Facility: CLINIC | Age: 48
End: 2023-06-05
Payer: COMMERCIAL

## 2023-06-05 VITALS
SYSTOLIC BLOOD PRESSURE: 120 MMHG | OXYGEN SATURATION: 97 % | DIASTOLIC BLOOD PRESSURE: 88 MMHG | HEIGHT: 66 IN | BODY MASS INDEX: 32.47 KG/M2 | HEART RATE: 82 BPM | TEMPERATURE: 98 F | WEIGHT: 202 LBS

## 2023-06-05 DIAGNOSIS — I10 ESSENTIAL HYPERTENSION: Primary | ICD-10-CM

## 2023-06-05 DIAGNOSIS — R73.09 ELEVATED GLUCOSE: ICD-10-CM

## 2023-06-05 PROCEDURE — 1160F RVW MEDS BY RX/DR IN RCRD: CPT | Mod: CPTII,S$GLB,, | Performed by: NURSE PRACTITIONER

## 2023-06-05 PROCEDURE — 3074F PR MOST RECENT SYSTOLIC BLOOD PRESSURE < 130 MM HG: ICD-10-PCS | Mod: CPTII,S$GLB,, | Performed by: NURSE PRACTITIONER

## 2023-06-05 PROCEDURE — 1159F PR MEDICATION LIST DOCUMENTED IN MEDICAL RECORD: ICD-10-PCS | Mod: CPTII,S$GLB,, | Performed by: NURSE PRACTITIONER

## 2023-06-05 PROCEDURE — 99213 OFFICE O/P EST LOW 20 MIN: CPT | Mod: S$GLB,,, | Performed by: NURSE PRACTITIONER

## 2023-06-05 PROCEDURE — 1160F PR REVIEW ALL MEDS BY PRESCRIBER/CLIN PHARMACIST DOCUMENTED: ICD-10-PCS | Mod: CPTII,S$GLB,, | Performed by: NURSE PRACTITIONER

## 2023-06-05 PROCEDURE — 99213 PR OFFICE/OUTPT VISIT, EST, LEVL III, 20-29 MIN: ICD-10-PCS | Mod: S$GLB,,, | Performed by: NURSE PRACTITIONER

## 2023-06-05 PROCEDURE — 3079F DIAST BP 80-89 MM HG: CPT | Mod: CPTII,S$GLB,, | Performed by: NURSE PRACTITIONER

## 2023-06-05 PROCEDURE — 3074F SYST BP LT 130 MM HG: CPT | Mod: CPTII,S$GLB,, | Performed by: NURSE PRACTITIONER

## 2023-06-05 PROCEDURE — 1159F MED LIST DOCD IN RCRD: CPT | Mod: CPTII,S$GLB,, | Performed by: NURSE PRACTITIONER

## 2023-06-05 PROCEDURE — 3008F BODY MASS INDEX DOCD: CPT | Mod: CPTII,S$GLB,, | Performed by: NURSE PRACTITIONER

## 2023-06-05 PROCEDURE — 3079F PR MOST RECENT DIASTOLIC BLOOD PRESSURE 80-89 MM HG: ICD-10-PCS | Mod: CPTII,S$GLB,, | Performed by: NURSE PRACTITIONER

## 2023-06-05 PROCEDURE — 3008F PR BODY MASS INDEX (BMI) DOCUMENTED: ICD-10-PCS | Mod: CPTII,S$GLB,, | Performed by: NURSE PRACTITIONER

## 2023-06-05 RX ORDER — METOPROLOL SUCCINATE 25 MG/1
TABLET, EXTENDED RELEASE ORAL
Qty: 45 TABLET | Refills: 1 | Status: SHIPPED | OUTPATIENT
Start: 2023-06-05 | End: 2023-06-30 | Stop reason: SDUPTHER

## 2023-06-05 NOTE — PROGRESS NOTES
SUBJECTIVE:      Patient ID: Jem Alvarez is a 47 y.o. male.    Chief Complaint: Hypertension    Patient is here today to follow up on htn. He is taking toprol as prescribed. He has gained a little weight. He is due for routine labs    Hypertension  This is a chronic problem. The current episode started more than 1 year ago. The problem is unchanged. The problem is controlled. Associated symptoms include headaches. Pertinent negatives include no chest pain, palpitations or shortness of breath. Risk factors for coronary artery disease include male gender. Past treatments include ACE inhibitors. There are no compliance problems.      Past Surgical History:   Procedure Laterality Date    CYSTOSCOPY N/A 11/3/2022    Procedure: CYSTOSCOPY;  Surgeon: Tre Hutton MD;  Location: [facility] OR;  Service: Urology;  Laterality: N/A;    CYSTOSCOPY W/ URETERAL STENT PLACEMENT Right 10/13/2022    Procedure: CYSTOSCOPY, WITH URETERAL STENT INSERTION;  Surgeon: Tre Hutton MD;  Location: [facility] OR;  Service: Urology;  Laterality: Right;    INSERTION OF URETERAL CATHETER Right 11/3/2022    Procedure: INSERTION, CATHETER, URETER;  Surgeon: Tre Hutotn MD;  Location: [facility] OR;  Service: Urology;  Laterality: Right;    REPAIR OF TORSION OF TESTICLE Right     age 15    URETEROSCOPY Right 11/3/2022    Procedure: URETEROSCOPY;  Surgeon: Tre Hutton MD;  Location: [facility] OR;  Service: Urology;  Laterality: Right;    VASECTOMY       Family History   Problem Relation Age of Onset    Hypertension Unknown     No Known Problems Mother     Hypertension Father     Arthritis Father     Nephrolithiasis Father     Stroke Maternal Grandmother     No Known Problems Maternal Grandfather     Stroke Paternal Grandmother     No Known Problems Paternal Grandfather     Hodgkin's lymphoma Son         age 17    Diabetes Neg Hx       Social History     Socioeconomic History    Marital status:    Occupational History     "Occupation:     Occupation: fire department   Tobacco Use    Smoking status: Every Day     Packs/day: 0.50     Types: Cigarettes     Start date: 1/1/2015     Passive exposure: Current    Smokeless tobacco: Never   Substance and Sexual Activity    Alcohol use: Yes     Comment: seldom    Drug use: No    Sexual activity: Yes   Social History Narrative    Live with partner     Social Determinants of Health     Financial Resource Strain: Low Risk     Difficulty of Paying Living Expenses: Not hard at all   Food Insecurity: No Food Insecurity    Worried About Running Out of Food in the Last Year: Never true    Ran Out of Food in the Last Year: Never true   Transportation Needs: No Transportation Needs    Lack of Transportation (Medical): No    Lack of Transportation (Non-Medical): No   Physical Activity: Sufficiently Active    Days of Exercise per Week: 7 days    Minutes of Exercise per Session: 60 min   Stress: No Stress Concern Present    Feeling of Stress : Not at all   Social Connections: Moderately Integrated    Frequency of Communication with Friends and Family: More than three times a week    Frequency of Social Gatherings with Friends and Family: More than three times a week    Attends Protestant Services: More than 4 times per year    Active Member of Clubs or Organizations: No    Attends Club or Organization Meetings: Never    Marital Status:    Housing Stability: Unknown    Unable to Pay for Housing in the Last Year: No    Unstable Housing in the Last Year: No     Current Outpatient Medications   Medication Sig Dispense Refill    metoprolol succinate (TOPROL-XL) 25 MG 24 hr tablet Take 1.5 tabs po daily 45 tablet 1     No current facility-administered medications for this visit.     Review of patient's allergies indicates:   Allergen Reactions    Anectine [succinylcholine chloride] Anaphylaxis     "Stopped breathing and Heart stopped" @ age 15  This is NOT a malignant hyperthermia " situation.    Anectine dalton-pack       Past Medical History:   Diagnosis Date    Anxiety     Hypertension     not on meds    Kidney stone      Past Surgical History:   Procedure Laterality Date    CYSTOSCOPY N/A 11/3/2022    Procedure: CYSTOSCOPY;  Surgeon: Tre Hutton MD;  Location: [facility] OR;  Service: Urology;  Laterality: N/A;    CYSTOSCOPY W/ URETERAL STENT PLACEMENT Right 10/13/2022    Procedure: CYSTOSCOPY, WITH URETERAL STENT INSERTION;  Surgeon: Tre Hutton MD;  Location: [facility] OR;  Service: Urology;  Laterality: Right;    INSERTION OF URETERAL CATHETER Right 11/3/2022    Procedure: INSERTION, CATHETER, URETER;  Surgeon: Tre Hutton MD;  Location: [facility] OR;  Service: Urology;  Laterality: Right;    REPAIR OF TORSION OF TESTICLE Right     age 15    URETEROSCOPY Right 11/3/2022    Procedure: URETEROSCOPY;  Surgeon: Tre Hutton MD;  Location: [facility] OR;  Service: Urology;  Laterality: Right;    VASECTOMY         Review of Systems   Constitutional:  Negative for appetite change, chills, diaphoresis and unexpected weight change.   HENT:  Negative for ear discharge, facial swelling, hearing loss, nosebleeds and trouble swallowing.    Eyes:  Negative for photophobia, pain and visual disturbance.   Respiratory:  Negative for apnea, choking, shortness of breath and wheezing.    Cardiovascular:  Negative for chest pain, palpitations and leg swelling.   Gastrointestinal:  Negative for abdominal pain, blood in stool and vomiting.   Endocrine: Negative for polyphagia.   Genitourinary:  Negative for difficulty urinating and hematuria.   Musculoskeletal:  Negative for gait problem and joint swelling.   Skin:  Negative for pallor.   Neurological:  Positive for headaches. Negative for dizziness, seizures, speech difficulty and weakness.   Hematological:  Does not bruise/bleed easily.   Psychiatric/Behavioral:  Negative for agitation, confusion, dysphoric mood, self-injury, sleep disturbance and  "suicidal ideas. The patient is not nervous/anxious.     OBJECTIVE:      Vitals:    06/05/23 1259   BP: (!) 120/90   Pulse: 82   Temp: 98.4 °F (36.9 °C)   SpO2: 97%   Weight: 91.6 kg (202 lb)   Height: 5' 6" (1.676 m)     Physical Exam  Vitals and nursing note reviewed.   Constitutional:       General: He is not in acute distress.     Appearance: He is well-developed.   HENT:      Head: Normocephalic and atraumatic.      Nose: Nose normal.      Mouth/Throat:      Pharynx: Uvula midline.   Eyes:      General: Lids are normal.      Conjunctiva/sclera: Conjunctivae normal.      Pupils: Pupils are equal, round, and reactive to light.      Right eye: Pupil is round and reactive.      Left eye: Pupil is round and reactive.   Neck:      Thyroid: No thyromegaly.      Vascular: No carotid bruit.   Cardiovascular:      Rate and Rhythm: Normal rate and regular rhythm.      Pulses: Normal pulses.      Heart sounds: Normal heart sounds. No murmur heard.  Pulmonary:      Effort: Pulmonary effort is normal.      Breath sounds: Normal breath sounds. No wheezing, rhonchi or rales.   Abdominal:      General: Bowel sounds are normal.      Palpations: Abdomen is soft. Abdomen is not rigid.      Tenderness: There is no abdominal tenderness.   Musculoskeletal:         General: Normal range of motion.      Cervical back: Normal range of motion and neck supple.      Right lower leg: No edema.      Left lower leg: No edema.   Lymphadenopathy:      Cervical: No cervical adenopathy.   Skin:     General: Skin is warm and dry.      Nails: There is no clubbing.   Neurological:      Mental Status: He is alert and oriented to person, place, and time.   Psychiatric:         Mood and Affect: Mood normal.         Speech: Speech normal.         Behavior: Behavior normal. Behavior is cooperative.         Thought Content: Thought content normal.         Judgment: Judgment normal.      Assessment:       1. Essential hypertension    2. Elevated glucose     "    Plan:       Essential hypertension  -     Hemoglobin A1C; Future; Expected date: 06/19/2023  -     Comprehensive Metabolic Panel; Future; Expected date: 06/19/2023  -     Lipid Panel; Future; Expected date: 06/19/2023  -   increase  metoprolol succinate (TOPROL-XL) 25 MG 24 hr tablet; Take 1.5 tabs po daily  Dispense: 45 tablet; Refill: 1  Will increase metoprolol. He will work on weight loss and monitor her blood pressure at home. Call in a few weeks with bp readings and f/u in 2mo    Elevated glucose  -     Hemoglobin A1C; Future; Expected date: 06/19/2023  -     Comprehensive Metabolic Panel; Future; Expected date: 06/19/2023        Follow up in about 2 months (around 8/5/2023) for htn .      6/5/2023 JOLENE Ibrahim, FNP

## 2023-06-30 ENCOUNTER — LAB VISIT (OUTPATIENT)
Dept: LAB | Facility: HOSPITAL | Age: 48
End: 2023-06-30
Attending: NURSE PRACTITIONER
Payer: COMMERCIAL

## 2023-06-30 ENCOUNTER — PATIENT MESSAGE (OUTPATIENT)
Dept: FAMILY MEDICINE | Facility: CLINIC | Age: 48
End: 2023-06-30

## 2023-06-30 DIAGNOSIS — I10 ESSENTIAL HYPERTENSION: ICD-10-CM

## 2023-06-30 DIAGNOSIS — R73.09 ELEVATED GLUCOSE: ICD-10-CM

## 2023-06-30 LAB
ALBUMIN SERPL BCP-MCNC: 4.2 G/DL (ref 3.5–5.2)
ALP SERPL-CCNC: 61 U/L (ref 55–135)
ALT SERPL W/O P-5'-P-CCNC: 35 U/L (ref 10–44)
ANION GAP SERPL CALC-SCNC: 7 MMOL/L (ref 8–16)
AST SERPL-CCNC: 19 U/L (ref 10–40)
BILIRUB SERPL-MCNC: 0.8 MG/DL (ref 0.1–1)
BUN SERPL-MCNC: 16 MG/DL (ref 6–20)
CALCIUM SERPL-MCNC: 9.5 MG/DL (ref 8.7–10.5)
CHLORIDE SERPL-SCNC: 102 MMOL/L (ref 95–110)
CHOLEST SERPL-MCNC: 195 MG/DL (ref 120–199)
CHOLEST/HDLC SERPL: 5.3 {RATIO} (ref 2–5)
CO2 SERPL-SCNC: 29 MMOL/L (ref 23–29)
CREAT SERPL-MCNC: 1.1 MG/DL (ref 0.5–1.4)
EST. GFR  (NO RACE VARIABLE): >60 ML/MIN/1.73 M^2
ESTIMATED AVG GLUCOSE: 108 MG/DL (ref 68–131)
GLUCOSE SERPL-MCNC: 87 MG/DL (ref 70–110)
HBA1C MFR BLD: 5.4 % (ref 4.5–6.2)
HDLC SERPL-MCNC: 37 MG/DL (ref 40–75)
HDLC SERPL: 19 % (ref 20–50)
LDLC SERPL CALC-MCNC: 128.6 MG/DL (ref 63–159)
NONHDLC SERPL-MCNC: 158 MG/DL
POTASSIUM SERPL-SCNC: 3.9 MMOL/L (ref 3.5–5.1)
PROT SERPL-MCNC: 7.2 G/DL (ref 6–8.4)
SODIUM SERPL-SCNC: 138 MMOL/L (ref 136–145)
TRIGL SERPL-MCNC: 147 MG/DL (ref 30–150)

## 2023-06-30 PROCEDURE — 36415 COLL VENOUS BLD VENIPUNCTURE: CPT | Performed by: NURSE PRACTITIONER

## 2023-06-30 PROCEDURE — 80061 LIPID PANEL: CPT | Performed by: NURSE PRACTITIONER

## 2023-06-30 PROCEDURE — 83036 HEMOGLOBIN GLYCOSYLATED A1C: CPT | Performed by: NURSE PRACTITIONER

## 2023-06-30 PROCEDURE — 80053 COMPREHEN METABOLIC PANEL: CPT | Performed by: NURSE PRACTITIONER

## 2023-06-30 RX ORDER — METOPROLOL SUCCINATE 25 MG/1
TABLET, EXTENDED RELEASE ORAL
Qty: 45 TABLET | Refills: 1 | Status: SHIPPED | OUTPATIENT
Start: 2023-06-30 | End: 2023-07-17 | Stop reason: SDUPTHER

## 2023-06-30 NOTE — TELEPHONE ENCOUNTER
----- Message from Lorie Green sent at 6/30/2023  8:25 AM CDT -----  Regarding: Med refills  Patient left a message stating he need refills. Patient would like a call back

## 2023-06-30 NOTE — TELEPHONE ENCOUNTER
Pt. Is almost out of medication. He said it is working good, his BP has been running good, and it was 124/76 this morning. He will upload list of readings in portal and I'll forward to you.

## 2023-07-03 ENCOUNTER — PATIENT MESSAGE (OUTPATIENT)
Dept: FAMILY MEDICINE | Facility: CLINIC | Age: 48
End: 2023-07-03

## 2023-07-17 ENCOUNTER — OFFICE VISIT (OUTPATIENT)
Dept: FAMILY MEDICINE | Facility: CLINIC | Age: 48
End: 2023-07-17
Payer: COMMERCIAL

## 2023-07-17 VITALS
TEMPERATURE: 99 F | BODY MASS INDEX: 31.66 KG/M2 | HEART RATE: 78 BPM | OXYGEN SATURATION: 98 % | HEIGHT: 66 IN | WEIGHT: 197 LBS | DIASTOLIC BLOOD PRESSURE: 70 MMHG | SYSTOLIC BLOOD PRESSURE: 110 MMHG

## 2023-07-17 DIAGNOSIS — R05.9 COUGH, UNSPECIFIED TYPE: ICD-10-CM

## 2023-07-17 DIAGNOSIS — R21 RASH: ICD-10-CM

## 2023-07-17 DIAGNOSIS — I10 ESSENTIAL HYPERTENSION: Primary | ICD-10-CM

## 2023-07-17 DIAGNOSIS — E78.2 MIXED HYPERLIPIDEMIA: ICD-10-CM

## 2023-07-17 PROCEDURE — 99214 OFFICE O/P EST MOD 30 MIN: CPT | Mod: S$GLB,,, | Performed by: NURSE PRACTITIONER

## 2023-07-17 PROCEDURE — 99214 PR OFFICE/OUTPT VISIT, EST, LEVL IV, 30-39 MIN: ICD-10-PCS | Mod: S$GLB,,, | Performed by: NURSE PRACTITIONER

## 2023-07-17 PROCEDURE — 3074F PR MOST RECENT SYSTOLIC BLOOD PRESSURE < 130 MM HG: ICD-10-PCS | Mod: CPTII,S$GLB,, | Performed by: NURSE PRACTITIONER

## 2023-07-17 PROCEDURE — 1159F PR MEDICATION LIST DOCUMENTED IN MEDICAL RECORD: ICD-10-PCS | Mod: CPTII,S$GLB,, | Performed by: NURSE PRACTITIONER

## 2023-07-17 PROCEDURE — 3044F PR MOST RECENT HEMOGLOBIN A1C LEVEL <7.0%: ICD-10-PCS | Mod: CPTII,S$GLB,, | Performed by: NURSE PRACTITIONER

## 2023-07-17 PROCEDURE — 3078F PR MOST RECENT DIASTOLIC BLOOD PRESSURE < 80 MM HG: ICD-10-PCS | Mod: CPTII,S$GLB,, | Performed by: NURSE PRACTITIONER

## 2023-07-17 PROCEDURE — 3008F BODY MASS INDEX DOCD: CPT | Mod: CPTII,S$GLB,, | Performed by: NURSE PRACTITIONER

## 2023-07-17 PROCEDURE — 1159F MED LIST DOCD IN RCRD: CPT | Mod: CPTII,S$GLB,, | Performed by: NURSE PRACTITIONER

## 2023-07-17 PROCEDURE — 3078F DIAST BP <80 MM HG: CPT | Mod: CPTII,S$GLB,, | Performed by: NURSE PRACTITIONER

## 2023-07-17 PROCEDURE — 3074F SYST BP LT 130 MM HG: CPT | Mod: CPTII,S$GLB,, | Performed by: NURSE PRACTITIONER

## 2023-07-17 PROCEDURE — 1160F PR REVIEW ALL MEDS BY PRESCRIBER/CLIN PHARMACIST DOCUMENTED: ICD-10-PCS | Mod: CPTII,S$GLB,, | Performed by: NURSE PRACTITIONER

## 2023-07-17 PROCEDURE — 3044F HG A1C LEVEL LT 7.0%: CPT | Mod: CPTII,S$GLB,, | Performed by: NURSE PRACTITIONER

## 2023-07-17 PROCEDURE — 3008F PR BODY MASS INDEX (BMI) DOCUMENTED: ICD-10-PCS | Mod: CPTII,S$GLB,, | Performed by: NURSE PRACTITIONER

## 2023-07-17 PROCEDURE — 1160F RVW MEDS BY RX/DR IN RCRD: CPT | Mod: CPTII,S$GLB,, | Performed by: NURSE PRACTITIONER

## 2023-07-17 RX ORDER — METOPROLOL SUCCINATE 25 MG/1
TABLET, EXTENDED RELEASE ORAL
Qty: 45 TABLET | Refills: 5 | Status: SHIPPED | OUTPATIENT
Start: 2023-07-17 | End: 2024-01-19 | Stop reason: SDUPTHER

## 2023-07-17 RX ORDER — BETAMETHASONE DIPROPIONATE 0.5 MG/G
CREAM TOPICAL 2 TIMES DAILY
Qty: 50 G | Refills: 0 | Status: SHIPPED | OUTPATIENT
Start: 2023-07-17 | End: 2023-11-02

## 2023-07-17 RX ORDER — BENZONATATE 100 MG/1
100 CAPSULE ORAL 3 TIMES DAILY PRN
Qty: 30 CAPSULE | Refills: 0 | Status: SHIPPED | OUTPATIENT
Start: 2023-07-17 | End: 2023-07-27

## 2023-07-17 NOTE — PROGRESS NOTES
SUBJECTIVE:      Patient ID: Jem Alvarez is a 48 y.o. male.    Chief Complaint: Hypertension    Patient is here today to follow up on htn and review labs. Toprolol was increased at his previous ov. Bp is improved. Has a poison ivy rash, asking for medication to help. He is complaining of a cough for the past few weeks, says his son has a cold    Hypertension  This is a chronic problem. The current episode started more than 1 year ago. The problem is unchanged. The problem is controlled. Associated symptoms include headaches. Pertinent negatives include no chest pain, palpitations or shortness of breath. Risk factors for coronary artery disease include male gender. Past treatments include ACE inhibitors. There are no compliance problems.    Rash  This is a new problem. The current episode started in the past 7 days. The problem is unchanged. The affected locations include the left arm. The rash is characterized by redness and itchiness. He was exposed to plant contact. Associated symptoms include coughing. Pertinent negatives include no eye pain, rhinorrhea, shortness of breath or vomiting. Past treatments include nothing.   Cough  This is a new problem. The current episode started 1 to 4 weeks ago. The problem has been unchanged. The cough is Non-productive. Associated symptoms include ear congestion, headaches, postnasal drip and a rash. Pertinent negatives include no chest pain, chills, ear pain, rhinorrhea, shortness of breath or wheezing. Nothing aggravates the symptoms. He has tried OTC cough suppressant for the symptoms. The treatment provided mild relief.     Past Surgical History:   Procedure Laterality Date    CYSTOSCOPY N/A 11/3/2022    Procedure: CYSTOSCOPY;  Surgeon: Tre Hutton MD;  Location: [facility];  Service: Urology;  Laterality: N/A;    CYSTOSCOPY W/ URETERAL STENT PLACEMENT Right 10/13/2022    Procedure: CYSTOSCOPY, WITH URETERAL STENT INSERTION;  Surgeon: Tre Hutton MD;   Location: [facility] OR;  Service: Urology;  Laterality: Right;    INSERTION OF URETERAL CATHETER Right 11/3/2022    Procedure: INSERTION, CATHETER, URETER;  Surgeon: Tre Hutton MD;  Location: [facility] OR;  Service: Urology;  Laterality: Right;    REPAIR OF TORSION OF TESTICLE Right     age 15    URETEROSCOPY Right 11/3/2022    Procedure: URETEROSCOPY;  Surgeon: Tre Hutton MD;  Location: [facility] OR;  Service: Urology;  Laterality: Right;    VASECTOMY       Family History   Problem Relation Age of Onset    Hypertension Unknown     No Known Problems Mother     Hypertension Father     Arthritis Father     Nephrolithiasis Father     Stroke Maternal Grandmother     No Known Problems Maternal Grandfather     Stroke Paternal Grandmother     No Known Problems Paternal Grandfather     Hodgkin's lymphoma Son         age 17    Diabetes Neg Hx       Social History     Socioeconomic History    Marital status:    Occupational History    Occupation:     Occupation: fire department   Tobacco Use    Smoking status: Every Day     Packs/day: 0.50     Types: Cigarettes     Start date: 1/1/2015     Passive exposure: Current    Smokeless tobacco: Never   Substance and Sexual Activity    Alcohol use: Yes     Comment: seldom    Drug use: No    Sexual activity: Yes   Social History Narrative    Live with partner     Social Determinants of Health     Financial Resource Strain: Low Risk     Difficulty of Paying Living Expenses: Not hard at all   Food Insecurity: No Food Insecurity    Worried About Running Out of Food in the Last Year: Never true    Ran Out of Food in the Last Year: Never true   Transportation Needs: No Transportation Needs    Lack of Transportation (Medical): No    Lack of Transportation (Non-Medical): No   Physical Activity: Sufficiently Active    Days of Exercise per Week: 7 days    Minutes of Exercise per Session: 60 min   Stress: No Stress Concern Present    Feeling of Stress : Not at  "all   Social Connections: Moderately Integrated    Frequency of Communication with Friends and Family: More than three times a week    Frequency of Social Gatherings with Friends and Family: More than three times a week    Attends Gnosticist Services: More than 4 times per year    Active Member of Clubs or Organizations: No    Attends Club or Organization Meetings: Never    Marital Status:    Housing Stability: Unknown    Unable to Pay for Housing in the Last Year: No    Unstable Housing in the Last Year: No     Current Outpatient Medications   Medication Sig Dispense Refill    augmented betamethasone dipropionate (DIPROLENE-AF) 0.05 % cream Apply topically 2 (two) times daily. 50 g 0    benzonatate (TESSALON) 100 MG capsule Take 1 capsule (100 mg total) by mouth 3 (three) times daily as needed. 30 capsule 0    metoprolol succinate (TOPROL-XL) 25 MG 24 hr tablet Take 1.5 tabs po daily 45 tablet 5     No current facility-administered medications for this visit.     Review of patient's allergies indicates:   Allergen Reactions    Anectine [succinylcholine chloride] Anaphylaxis     "Stopped breathing and Heart stopped" @ age 15  This is NOT a malignant hyperthermia situation.    Anectine dalton-pack       Past Medical History:   Diagnosis Date    Anxiety     Hypertension     not on meds    Kidney stone      Past Surgical History:   Procedure Laterality Date    CYSTOSCOPY N/A 11/3/2022    Procedure: CYSTOSCOPY;  Surgeon: Tre Hutton MD;  Location: [facility];  Service: Urology;  Laterality: N/A;    CYSTOSCOPY W/ URETERAL STENT PLACEMENT Right 10/13/2022    Procedure: CYSTOSCOPY, WITH URETERAL STENT INSERTION;  Surgeon: Tre Hutton MD;  Location: [facility] OR;  Service: Urology;  Laterality: Right;    INSERTION OF URETERAL CATHETER Right 11/3/2022    Procedure: INSERTION, CATHETER, URETER;  Surgeon: Tre Hutton MD;  Location: [facility] OR;  Service: Urology;  Laterality: Right;    REPAIR OF TORSION OF " "TESTICLE Right     age 15    URETEROSCOPY Right 11/3/2022    Procedure: URETEROSCOPY;  Surgeon: Tre Hutton MD;  Location: [facility];  Service: Urology;  Laterality: Right;    VASECTOMY         Review of Systems   Constitutional:  Negative for appetite change, chills, diaphoresis and unexpected weight change.   HENT:  Positive for postnasal drip. Negative for ear discharge, ear pain, facial swelling, hearing loss, nosebleeds, rhinorrhea and trouble swallowing.    Eyes:  Negative for photophobia, pain and visual disturbance.   Respiratory:  Positive for cough. Negative for apnea, choking, shortness of breath and wheezing.    Cardiovascular:  Negative for chest pain and palpitations.   Gastrointestinal:  Negative for abdominal pain, blood in stool and vomiting.   Endocrine: Negative for polyphagia.   Genitourinary:  Negative for difficulty urinating and hematuria.   Musculoskeletal:  Negative for gait problem and joint swelling.   Skin:  Positive for rash. Negative for pallor.   Neurological:  Positive for headaches. Negative for dizziness, seizures, speech difficulty and weakness.   Hematological:  Does not bruise/bleed easily.   Psychiatric/Behavioral:  Negative for agitation, confusion and dysphoric mood.     OBJECTIVE:      Vitals:    07/17/23 1435   BP: 110/70   Pulse: 78   Temp: 98.6 °F (37 °C)   SpO2: 98%   Weight: 89.4 kg (197 lb)   Height: 5' 6" (1.676 m)     Physical Exam  Vitals and nursing note reviewed.   Constitutional:       General: He is not in acute distress.     Appearance: He is well-developed.   HENT:      Head: Normocephalic and atraumatic.      Nose: Nose normal.      Mouth/Throat:      Pharynx: Uvula midline.   Eyes:      General: Lids are normal.      Conjunctiva/sclera: Conjunctivae normal.      Pupils: Pupils are equal, round, and reactive to light.      Right eye: Pupil is round and reactive.      Left eye: Pupil is round and reactive.   Neck:      Thyroid: No thyromegaly.      " Vascular: No carotid bruit.   Cardiovascular:      Rate and Rhythm: Normal rate and regular rhythm.      Pulses: Normal pulses.      Heart sounds: Normal heart sounds. No murmur heard.  Pulmonary:      Effort: Pulmonary effort is normal.      Breath sounds: Normal breath sounds. No wheezing, rhonchi or rales.   Abdominal:      General: Bowel sounds are normal.      Palpations: Abdomen is soft. Abdomen is not rigid.      Tenderness: There is no abdominal tenderness.   Musculoskeletal:         General: Normal range of motion.      Cervical back: Normal range of motion and neck supple.      Right lower leg: No edema.      Left lower leg: No edema.   Lymphadenopathy:      Cervical: No cervical adenopathy.   Skin:     General: Skin is warm and dry.      Findings: Rash (left forearm) present.      Nails: There is no clubbing.   Neurological:      Mental Status: He is alert and oriented to person, place, and time.   Psychiatric:         Mood and Affect: Mood normal.         Speech: Speech normal.         Behavior: Behavior normal. Behavior is cooperative.         Thought Content: Thought content normal.         Judgment: Judgment normal.        Lab Visit on 06/30/2023   Component Date Value Ref Range Status    Hemoglobin A1C 06/30/2023 5.4  4.5 - 6.2 % Final    Comment: According to ADA guidelines, hemoglobin A1C <7.0% represents  optimal control in non-pregnant diabetic patients.  Different  metrics may apply to specific populations.   Standards of Medical Care in Diabetes - 2016.    For the purpose of screening for the presence of diabetes:  <5.7%     Consistent with the absence of diabetes  5.7-6.4%  Consistent with increasing risk for diabetes   (prediabetes)  >or=6.5%  Consistent with diabetes    Currently no consensus exists for use of hemoglobin A1C  for diagnosis of diabetes for children.      Estimated Avg Glucose 06/30/2023 108  68 - 131 mg/dL Final    Sodium 06/30/2023 138  136 - 145 mmol/L Final    Potassium  06/30/2023 3.9  3.5 - 5.1 mmol/L Final    Chloride 06/30/2023 102  95 - 110 mmol/L Final    CO2 06/30/2023 29  23 - 29 mmol/L Final    Glucose 06/30/2023 87  70 - 110 mg/dL Final    BUN 06/30/2023 16  6 - 20 mg/dL Final    Creatinine 06/30/2023 1.1  0.5 - 1.4 mg/dL Final    Calcium 06/30/2023 9.5  8.7 - 10.5 mg/dL Final    Total Protein 06/30/2023 7.2  6.0 - 8.4 g/dL Final    Albumin 06/30/2023 4.2  3.5 - 5.2 g/dL Final    Total Bilirubin 06/30/2023 0.8  0.1 - 1.0 mg/dL Final    Comment: For infants and newborns, interpretation of results should be based  on gestational age, weight and in agreement with clinical  observations.    Premature Infant recommended reference ranges:  Up to 24 hours.............<8.0 mg/dL  Up to 48 hours............<12.0 mg/dL  3-5 days..................<15.0 mg/dL  6-29 days.................<15.0 mg/dL      Alkaline Phosphatase 06/30/2023 61  55 - 135 U/L Final    AST 06/30/2023 19  10 - 40 U/L Final    ALT 06/30/2023 35  10 - 44 U/L Final    eGFR 06/30/2023 >60.0  >60 mL/min/1.73 m^2 Final    Anion Gap 06/30/2023 7 (L)  8 - 16 mmol/L Final    Cholesterol 06/30/2023 195  120 - 199 mg/dL Final    Comment: The National Cholesterol Education Program (NCEP) has set the  following guidelines (reference ranges) for Cholesterol:  Optimal.....................<200 mg/dL  Borderline High.............200-239 mg/dL  High........................> or = 240 mg/dL      Triglycerides 06/30/2023 147  30 - 150 mg/dL Final    Comment: The National Cholesterol Education Program (NCEP) has set the  following guidelines (reference values) for triglycerides:  Normal......................<150 mg/dL  Borderline High.............150-199 mg/dL  High........................200-499 mg/dL      HDL 06/30/2023 37 (L)  40 - 75 mg/dL Final    Comment: The National Cholesterol Education Program (NCEP) has set the  following guidelines (reference values) for HDL Cholesterol:  Low...............<40 mg/dL  Optimal...........>60  mg/dL      LDL Cholesterol 06/30/2023 128.6  63.0 - 159.0 mg/dL Final    Comment: The National Cholesterol Education Program (NCEP) has set the  following guidelines (reference values) for LDL Cholesterol:  Optimal.......................<130 mg/dL  Borderline High...............130-159 mg/dL  High..........................160-189 mg/dL  Very High.....................>190 mg/dL      HDL/Cholesterol Ratio 06/30/2023 19.0 (L)  20.0 - 50.0 % Final    Total Cholesterol/HDL Ratio 06/30/2023 5.3 (H)  2.0 - 5.0 Final    Non-HDL Cholesterol 06/30/2023 158  mg/dL Final    Comment: Risk category and Non-HDL cholesterol goals:  Coronary heart disease (CHD)or equivalent (10-year risk of CHD >20%):  Non-HDL cholesterol goal     <130 mg/dL  Two or more CHD risk factors and 10-year risk of CHD <= 20%:  Non-HDL cholesterol goal     <160 mg/dL  0 to 1 CHD risk factor:  Non-HDL cholesterol goal     <190 mg/dL     ]    Last visit note, most recent available labs, and health maintenance reviewed    Assessment:       1. Essential hypertension    2. Rash    3. Mixed hyperlipidemia    4. Cough, unspecified type        Plan:       Essential hypertension  -     metoprolol succinate (TOPROL-XL) 25 MG 24 hr tablet; Take 1.5 tabs po daily  Dispense: 45 tablet; Refill: 5    Rash  -     augmented betamethasone dipropionate (DIPROLENE-AF) 0.05 % cream; Apply topically 2 (two) times daily.  Dispense: 50 g; Refill: 0    Mixed hyperlipidemia  LDL is elevated; recommend high fiber, low fat diet, daily metamucil supplement and daily aerobic exercise    Cough, unspecified type  -     benzonatate (TESSALON) 100 MG capsule; Take 1 capsule (100 mg total) by mouth 3 (three) times daily as needed.  Dispense: 30 capsule; Refill: 0        Follow up in about 6 months (around 1/17/2024) for htn .      7/17/2023 Franki Salcedo, APRN, FNP

## 2023-11-02 ENCOUNTER — OFFICE VISIT (OUTPATIENT)
Dept: FAMILY MEDICINE | Facility: CLINIC | Age: 48
End: 2023-11-02
Payer: COMMERCIAL

## 2023-11-02 VITALS
TEMPERATURE: 99 F | OXYGEN SATURATION: 95 % | SYSTOLIC BLOOD PRESSURE: 108 MMHG | HEIGHT: 66 IN | WEIGHT: 201 LBS | DIASTOLIC BLOOD PRESSURE: 82 MMHG | BODY MASS INDEX: 32.3 KG/M2 | HEART RATE: 90 BPM

## 2023-11-02 DIAGNOSIS — J06.9 UPPER RESPIRATORY TRACT INFECTION, UNSPECIFIED TYPE: Primary | ICD-10-CM

## 2023-11-02 PROBLEM — H93.13 BILATERAL TINNITUS: Status: ACTIVE | Noted: 2023-11-02

## 2023-11-02 PROBLEM — H92.02 OTALGIA OF LEFT EAR: Status: ACTIVE | Noted: 2023-11-02

## 2023-11-02 PROCEDURE — 1159F MED LIST DOCD IN RCRD: CPT | Mod: CPTII,S$GLB,, | Performed by: INTERNAL MEDICINE

## 2023-11-02 PROCEDURE — 3074F PR MOST RECENT SYSTOLIC BLOOD PRESSURE < 130 MM HG: ICD-10-PCS | Mod: CPTII,S$GLB,, | Performed by: INTERNAL MEDICINE

## 2023-11-02 PROCEDURE — 3044F HG A1C LEVEL LT 7.0%: CPT | Mod: CPTII,S$GLB,, | Performed by: INTERNAL MEDICINE

## 2023-11-02 PROCEDURE — 3008F PR BODY MASS INDEX (BMI) DOCUMENTED: ICD-10-PCS | Mod: CPTII,S$GLB,, | Performed by: INTERNAL MEDICINE

## 2023-11-02 PROCEDURE — 3044F PR MOST RECENT HEMOGLOBIN A1C LEVEL <7.0%: ICD-10-PCS | Mod: CPTII,S$GLB,, | Performed by: INTERNAL MEDICINE

## 2023-11-02 PROCEDURE — 99213 PR OFFICE/OUTPT VISIT, EST, LEVL III, 20-29 MIN: ICD-10-PCS | Mod: S$GLB,,, | Performed by: INTERNAL MEDICINE

## 2023-11-02 PROCEDURE — 1159F PR MEDICATION LIST DOCUMENTED IN MEDICAL RECORD: ICD-10-PCS | Mod: CPTII,S$GLB,, | Performed by: INTERNAL MEDICINE

## 2023-11-02 PROCEDURE — 3008F BODY MASS INDEX DOCD: CPT | Mod: CPTII,S$GLB,, | Performed by: INTERNAL MEDICINE

## 2023-11-02 PROCEDURE — 3074F SYST BP LT 130 MM HG: CPT | Mod: CPTII,S$GLB,, | Performed by: INTERNAL MEDICINE

## 2023-11-02 PROCEDURE — 3079F DIAST BP 80-89 MM HG: CPT | Mod: CPTII,S$GLB,, | Performed by: INTERNAL MEDICINE

## 2023-11-02 PROCEDURE — 3079F PR MOST RECENT DIASTOLIC BLOOD PRESSURE 80-89 MM HG: ICD-10-PCS | Mod: CPTII,S$GLB,, | Performed by: INTERNAL MEDICINE

## 2023-11-02 PROCEDURE — 99213 OFFICE O/P EST LOW 20 MIN: CPT | Mod: S$GLB,,, | Performed by: INTERNAL MEDICINE

## 2023-11-02 NOTE — PROGRESS NOTES
Subjective:       Patient ID: Jem Alvarez is a 48 y.o. male.    Chief Complaint: Sore Throat, Sinus Problem (Post nasal drip x 2 days/), and Facial Pain (Sinus pressure)    Sinus Problem  This is a new problem. The current episode started in the past 7 days (2 days). Maximum temperature: felt hot all night but didn't check temp. Associated symptoms include sinus pressure and a sore throat (overnight; feels better currently). Pertinent negatives include no chills, congestion, coughing, diaphoresis, ear pain, headaches, neck pain, shortness of breath or sneezing. (Rhinnorrhea  ) Past treatments include oral decongestants and acetaminophen. The treatment provided mild relief.     Review of Systems   Constitutional:  Negative for activity change, appetite change, chills, diaphoresis, fatigue, fever and unexpected weight change.   HENT:  Positive for postnasal drip, sinus pressure, sinus pain, sore throat (overnight; feels better currently) and voice change. Negative for congestion, ear discharge, ear pain, hearing loss, nosebleeds, rhinorrhea, sneezing, tinnitus and trouble swallowing.    Eyes:  Negative for photophobia, pain, discharge, redness, itching and visual disturbance.   Respiratory:  Negative for apnea, cough, choking, chest tightness, shortness of breath and wheezing.    Cardiovascular:  Negative for chest pain, palpitations and leg swelling.   Gastrointestinal:  Negative for abdominal distention, abdominal pain, blood in stool, constipation, diarrhea, nausea and vomiting.   Endocrine: Negative for cold intolerance, heat intolerance, polydipsia and polyuria.   Genitourinary:  Negative for decreased urine volume, difficulty urinating, dysuria, enuresis, flank pain, frequency, genital sores, hematuria, penile discharge, penile pain, scrotal swelling, testicular pain and urgency.   Musculoskeletal:  Negative for arthralgias, back pain, gait problem, joint swelling, myalgias, neck pain and neck stiffness.    Skin:  Negative for rash and wound.   Allergic/Immunologic: Negative for environmental allergies, food allergies and immunocompromised state.   Neurological:  Negative for dizziness, tremors, seizures, syncope, facial asymmetry, speech difficulty, weakness, light-headedness, numbness and headaches.   Hematological:  Negative for adenopathy. Does not bruise/bleed easily.   Psychiatric/Behavioral:  Negative for confusion, decreased concentration, hallucinations, self-injury, sleep disturbance and suicidal ideas. The patient is not nervous/anxious.        Past Medical History:   Diagnosis Date    Anxiety     Hypertension     not on meds    Kidney stone       Past Surgical History:   Procedure Laterality Date    CYSTOSCOPY N/A 11/3/2022    Procedure: CYSTOSCOPY;  Surgeon: Tre Hutton MD;  Location: [facility];  Service: Urology;  Laterality: N/A;    CYSTOSCOPY W/ URETERAL STENT PLACEMENT Right 10/13/2022    Procedure: CYSTOSCOPY, WITH URETERAL STENT INSERTION;  Surgeon: Tre Hutton MD;  Location: [facility];  Service: Urology;  Laterality: Right;    INSERTION OF URETERAL CATHETER Right 11/3/2022    Procedure: INSERTION, CATHETER, URETER;  Surgeon: Tre Hutton MD;  Location: [facility] OR;  Service: Urology;  Laterality: Right;    REPAIR OF TORSION OF TESTICLE Right     age 15    URETEROSCOPY Right 11/3/2022    Procedure: URETEROSCOPY;  Surgeon: Tre Hutton MD;  Location: [facility];  Service: Urology;  Laterality: Right;    VASECTOMY         Family History   Problem Relation Age of Onset    Hypertension Unknown     No Known Problems Mother     Hypertension Father     Arthritis Father     Nephrolithiasis Father     Stroke Maternal Grandmother     No Known Problems Maternal Grandfather     Stroke Paternal Grandmother     No Known Problems Paternal Grandfather     Hodgkin's lymphoma Son         age 17    Diabetes Neg Hx        Social History     Socioeconomic History    Marital status:     Occupational History    Occupation:     Occupation: fire department   Tobacco Use    Smoking status: Every Day     Current packs/day: 0.50     Average packs/day: 0.5 packs/day for 8.8 years (4.4 ttl pk-yrs)     Types: Cigarettes     Start date: 1/1/2015     Passive exposure: Current    Smokeless tobacco: Never   Substance and Sexual Activity    Alcohol use: Yes     Comment: seldom    Drug use: No    Sexual activity: Yes   Social History Narrative    Live with partner     Social Determinants of Health     Financial Resource Strain: Low Risk  (10/12/2022)    Overall Financial Resource Strain (CARDIA)     Difficulty of Paying Living Expenses: Not hard at all   Food Insecurity: No Food Insecurity (10/12/2022)    Hunger Vital Sign     Worried About Running Out of Food in the Last Year: Never true     Ran Out of Food in the Last Year: Never true   Transportation Needs: No Transportation Needs (10/12/2022)    PRAPARE - Transportation     Lack of Transportation (Medical): No     Lack of Transportation (Non-Medical): No   Physical Activity: Sufficiently Active (10/12/2022)    Exercise Vital Sign     Days of Exercise per Week: 7 days     Minutes of Exercise per Session: 60 min   Stress: No Stress Concern Present (10/12/2022)    [employer] - Occupational Stress Questionnaire     Feeling of Stress : Not at all   Social Connections: Moderately Integrated (10/12/2022)    Social Connection and Isolation Panel [NHANES]     Frequency of Communication with Friends and Family: More than three times a week     Frequency of Social Gatherings with Friends and Family: More than three times a week     Attends Yazidism Services: More than 4 times per year     Active Member of Clubs or Organizations: No     Attends Club or Organization Meetings: Never     Marital Status:    Housing Stability: Unknown (10/12/2022)    Housing Stability Vital Sign     Unable to Pay for Housing in  "the Last Year: No     Unstable Housing in the Last Year: No       Current Outpatient Medications   Medication Sig Dispense Refill    metoprolol succinate (TOPROL-XL) 25 MG 24 hr tablet Take 1.5 tabs po daily 45 tablet 5    augmented betamethasone dipropionate (DIPROLENE-AF) 0.05 % cream Apply topically 2 (two) times daily. (Patient not taking: Reported on 11/2/2023) 50 g 0    loratadine-pseudoephedrine 5-120 mg (CLARITIN-D 12-HOUR) 5-120 mg per tablet Take 1 tablet by mouth 2 (two) times daily. for 10 days 20 tablet 0     No current facility-administered medications for this visit.       Review of patient's allergies indicates:   Allergen Reactions    Anectine [succinylcholine chloride] Anaphylaxis     "Stopped breathing and Heart stopped" @ age 15  This is NOT a malignant hyperthermia situation.    Anectine dalton-pack      Objective:    HPI     Sinus Problem     Additional comments: Post nasal drip x 2 days             Facial Pain     Additional comments: Sinus pressure          Last edited by Elena Dodge MA on 11/2/2023  8:45 AM.      Blood pressure 108/82, pulse 90, temperature 99 °F (37.2 °C), temperature source Temporal, height 5' 6" (1.676 m), weight 91.2 kg (201 lb), SpO2 95 %. Body mass index is 32.44 kg/m².   Physical Exam  Constitutional:       General: He is not in acute distress.     Appearance: He is well-developed. He is not ill-appearing, toxic-appearing or diaphoretic.   HENT:      Head: Normocephalic.      Right Ear: Tympanic membrane, ear canal and external ear normal.      Left Ear: Tympanic membrane, ear canal and external ear normal.      Nose: Nose normal. No rhinorrhea.      Right Sinus: No maxillary sinus tenderness or frontal sinus tenderness.      Left Sinus: No maxillary sinus tenderness or frontal sinus tenderness.      Mouth/Throat:      Pharynx: No oropharyngeal exudate or posterior oropharyngeal erythema.      Tonsils: No tonsillar exudate.   Cardiovascular:      Rate and Rhythm: " Normal rate and regular rhythm.      Heart sounds: Normal heart sounds. No murmur heard.     No friction rub. No gallop.   Pulmonary:      Effort: Pulmonary effort is normal. No tachypnea, accessory muscle usage or respiratory distress.      Breath sounds: Normal breath sounds. No wheezing, rhonchi or rales.   Neurological:      Mental Status: He is alert.             Assessment:       1. Upper respiratory tract infection, unspecified type        Plan:       Jem was seen today for sore throat, sinus problem and facial pain.    Diagnoses and all orders for this visit:    Upper respiratory tract infection, unspecified type  -     loratadine-pseudoephedrine 5-120 mg (CLARITIN-D 12-HOUR) 5-120 mg per tablet; Take 1 tablet by mouth 2 (two) times daily. for 10 days

## 2024-01-18 NOTE — PROGRESS NOTES
SUBJECTIVE:      Patient ID: Jem Alvarez is a 48 y.o. male.    Chief Complaint: Annual Exam    Patient is here today for an annual exam.  He is taking toprol as prescribed.     Hypertension  This is a chronic problem. The current episode started more than 1 year ago. The problem is unchanged. The problem is controlled. Associated symptoms include headaches. Pertinent negatives include no chest pain, palpitations or shortness of breath. Risk factors for coronary artery disease include male gender. Past treatments include ACE inhibitors. There are no compliance problems.        Past Surgical History:   Procedure Laterality Date    CYSTOSCOPY N/A 11/3/2022    Procedure: CYSTOSCOPY;  Surgeon: Tre Hutton MD;  Location: [facility] OR;  Service: Urology;  Laterality: N/A;    CYSTOSCOPY W/ URETERAL STENT PLACEMENT Right 10/13/2022    Procedure: CYSTOSCOPY, WITH URETERAL STENT INSERTION;  Surgeon: Tre Hutton MD;  Location: [facility] OR;  Service: Urology;  Laterality: Right;    INSERTION OF URETERAL CATHETER Right 11/3/2022    Procedure: INSERTION, CATHETER, URETER;  Surgeon: Tre Hutton MD;  Location: [facility] OR;  Service: Urology;  Laterality: Right;    REPAIR OF TORSION OF TESTICLE Right     age 15    URETEROSCOPY Right 11/3/2022    Procedure: URETEROSCOPY;  Surgeon: Tre Hutton MD;  Location: [facility];  Service: Urology;  Laterality: Right;    VASECTOMY       Family History   Problem Relation Age of Onset    Hypertension Unknown     No Known Problems Mother     Hypertension Father     Arthritis Father     Nephrolithiasis Father     Stroke Maternal Grandmother     No Known Problems Maternal Grandfather     Stroke Paternal Grandmother     No Known Problems Paternal Grandfather     Hodgkin's lymphoma Son         age 17    Diabetes Neg Hx       Social History     Socioeconomic History    Marital status:    Occupational History    Occupation:     Occupation: fire  department   Tobacco Use    Smoking status: Former     Average packs/day: 0.5 packs/day for 9.0 years (4.4 ttl pk-yrs)     Types: Cigarettes     Start date: 1/1/2015     Passive exposure: Current    Smokeless tobacco: Never   Substance and Sexual Activity    Alcohol use: Yes     Comment: seldom    Drug use: No    Sexual activity: Yes   Social History Narrative    Live with partner     Social Determinants of Health     Financial Resource Strain: Low Risk  (10/12/2022)    Overall Financial Resource Strain (CARDIA)     Difficulty of Paying Living Expenses: Not hard at all   Food Insecurity: No Food Insecurity (10/12/2022)    Hunger Vital Sign     Worried About Running Out of Food in the Last Year: Never true     Ran Out of Food in the Last Year: Never true   Transportation Needs: No Transportation Needs (10/12/2022)    PRAPARE - Transportation     Lack of Transportation (Medical): No     Lack of Transportation (Non-Medical): No   Physical Activity: Sufficiently Active (10/12/2022)    Exercise Vital Sign     Days of Exercise per Week: 7 days     Minutes of Exercise per Session: 60 min   Stress: No Stress Concern Present (10/12/2022)    [employer] - Occupational Stress Questionnaire     Feeling of Stress : Not at all   Social Connections: Moderately Integrated (10/12/2022)    Social Connection and Isolation Panel [NHANES]     Frequency of Communication with Friends and Family: More than three times a week     Frequency of Social Gatherings with Friends and Family: More than three times a week     Attends Congregation Services: More than 4 times per year     Active Member of Clubs or Organizations: No     Attends Club or Organization Meetings: Never     Marital Status:    Housing Stability: Unknown (10/12/2022)    Housing Stability Vital Sign     Unable to Pay for Housing in the Last Year: No     Unstable Housing in the Last Year: No     Current Outpatient Medications   Medication Sig  "Dispense Refill    metoprolol succinate (TOPROL-XL) 25 MG 24 hr tablet Take 1.5 tabs po daily 135 tablet 1     No current facility-administered medications for this visit.     Review of patient's allergies indicates:   Allergen Reactions    Anectine [succinylcholine chloride] Anaphylaxis     "Stopped breathing and Heart stopped" @ age 15  This is NOT a malignant hyperthermia situation.    Anectine dalton-pack       Past Medical History:   Diagnosis Date    Anxiety     Hypertension     not on meds    Kidney stone      Past Surgical History:   Procedure Laterality Date    CYSTOSCOPY N/A 11/3/2022    Procedure: CYSTOSCOPY;  Surgeon: Tre Hutton MD;  Location: [facility] OR;  Service: Urology;  Laterality: N/A;    CYSTOSCOPY W/ URETERAL STENT PLACEMENT Right 10/13/2022    Procedure: CYSTOSCOPY, WITH URETERAL STENT INSERTION;  Surgeon: Tre Hutton MD;  Location: [facility] OR;  Service: Urology;  Laterality: Right;    INSERTION OF URETERAL CATHETER Right 11/3/2022    Procedure: INSERTION, CATHETER, URETER;  Surgeon: Tre Hutton MD;  Location: [facility] OR;  Service: Urology;  Laterality: Right;    REPAIR OF TORSION OF TESTICLE Right     age 15    URETEROSCOPY Right 11/3/2022    Procedure: URETEROSCOPY;  Surgeon: Tre Hutton MD;  Location: [facility] OR;  Service: Urology;  Laterality: Right;    VASECTOMY         Review of Systems   Constitutional:  Negative for appetite change, chills, diaphoresis and unexpected weight change.   HENT:  Negative for ear discharge, facial swelling, hearing loss, nosebleeds and trouble swallowing.    Eyes:  Negative for photophobia, pain and visual disturbance.   Respiratory:  Negative for apnea, choking, shortness of breath and wheezing.    Cardiovascular:  Negative for chest pain, palpitations and leg swelling.   Gastrointestinal:  Negative for abdominal pain, blood in stool and vomiting.   Endocrine: Negative for polyphagia.   Genitourinary:  Negative for difficulty urinating and " "hematuria.   Musculoskeletal:  Negative for gait problem and joint swelling.   Skin:  Negative for pallor.   Neurological:  Positive for headaches. Negative for dizziness, seizures, speech difficulty and weakness.   Hematological:  Does not bruise/bleed easily.   Psychiatric/Behavioral:  Negative for agitation, confusion, dysphoric mood, self-injury, sleep disturbance and suicidal ideas. The patient is not nervous/anxious.       OBJECTIVE:      Vitals:    01/19/24 0745   BP: 126/82   Pulse: (P) 79   SpO2: 99%   Weight: 96.2 kg (212 lb)   Height: 5' 6" (1.676 m)     Physical Exam  Vitals and nursing note reviewed.   Constitutional:       General: He is not in acute distress.     Appearance: He is well-developed.   HENT:      Head: Normocephalic and atraumatic.      Nose: Nose normal.      Mouth/Throat:      Pharynx: Uvula midline.   Eyes:      General: Lids are normal.      Conjunctiva/sclera: Conjunctivae normal.      Pupils: Pupils are equal, round, and reactive to light.      Right eye: Pupil is round and reactive.      Left eye: Pupil is round and reactive.   Neck:      Thyroid: No thyromegaly.      Vascular: No carotid bruit.   Cardiovascular:      Rate and Rhythm: Normal rate and regular rhythm.      Pulses: Normal pulses.      Heart sounds: Normal heart sounds. No murmur heard.  Pulmonary:      Effort: Pulmonary effort is normal.      Breath sounds: Normal breath sounds. No wheezing, rhonchi or rales.   Abdominal:      General: Bowel sounds are normal.      Palpations: Abdomen is soft. Abdomen is not rigid.      Tenderness: There is no abdominal tenderness.   Musculoskeletal:         General: Normal range of motion.      Cervical back: Normal range of motion and neck supple.      Right lower leg: No edema.      Left lower leg: No edema.   Lymphadenopathy:      Cervical: No cervical adenopathy.   Skin:     General: Skin is warm and dry.      Nails: There is no clubbing.   Neurological:      Mental Status: He is " alert and oriented to person, place, and time.   Psychiatric:         Mood and Affect: Mood normal.         Speech: Speech normal.         Behavior: Behavior normal. Behavior is cooperative.         Thought Content: Thought content normal.         Judgment: Judgment normal.          Last visit note, most recent available labs, and health maintenance reviewed    Assessment:       1. Preventative health care    2. Essential hypertension    3. Screen for colon cancer        Plan:       Preventative health care  -     CBC Auto Differential; Future; Expected date: 02/02/2024  -     Comprehensive Metabolic Panel; Future; Expected date: 02/02/2024  -     Lipid Panel; Future; Expected date: 02/02/2024  -     TSH; Future; Expected date: 03/01/2024  -     Urinalysis; Future; Expected date: 02/02/2024  Counseled on age and gender appropriate medical preventative services, including cancer screenings, immunizations, overall nutritional health, need for a consistent exercise regimen and an overall push towards maintaining a vigorous and active lifestyle.     Essential hypertension  -     metoprolol succinate (TOPROL-XL) 25 MG 24 hr tablet; Take 1.5 tabs po daily  Dispense: 135 tablet; Refill: 1    Screen for colon cancer  -     Ambulatory referral/consult to Gastroenterology; Future; Expected date: 01/26/2024        Follow up in about 6 months (around 7/19/2024) for htn.      1/19/2024 JOLENE Ibrahim, TRINHP

## 2024-01-19 ENCOUNTER — PATIENT MESSAGE (OUTPATIENT)
Dept: GASTROENTEROLOGY | Facility: CLINIC | Age: 49
End: 2024-01-19
Payer: COMMERCIAL

## 2024-01-19 ENCOUNTER — OFFICE VISIT (OUTPATIENT)
Dept: FAMILY MEDICINE | Facility: CLINIC | Age: 49
End: 2024-01-19
Payer: COMMERCIAL

## 2024-01-19 VITALS
HEIGHT: 66 IN | SYSTOLIC BLOOD PRESSURE: 126 MMHG | DIASTOLIC BLOOD PRESSURE: 82 MMHG | WEIGHT: 212 LBS | BODY MASS INDEX: 34.07 KG/M2 | OXYGEN SATURATION: 99 %

## 2024-01-19 DIAGNOSIS — Z00.00 PREVENTATIVE HEALTH CARE: Primary | ICD-10-CM

## 2024-01-19 DIAGNOSIS — I10 ESSENTIAL HYPERTENSION: ICD-10-CM

## 2024-01-19 DIAGNOSIS — Z12.11 SCREEN FOR COLON CANCER: ICD-10-CM

## 2024-01-19 PROCEDURE — 1159F MED LIST DOCD IN RCRD: CPT | Mod: CPTII,S$GLB,, | Performed by: NURSE PRACTITIONER

## 2024-01-19 PROCEDURE — 3079F DIAST BP 80-89 MM HG: CPT | Mod: CPTII,S$GLB,, | Performed by: NURSE PRACTITIONER

## 2024-01-19 PROCEDURE — 3074F SYST BP LT 130 MM HG: CPT | Mod: CPTII,S$GLB,, | Performed by: NURSE PRACTITIONER

## 2024-01-19 PROCEDURE — 3008F BODY MASS INDEX DOCD: CPT | Mod: CPTII,S$GLB,, | Performed by: NURSE PRACTITIONER

## 2024-01-19 PROCEDURE — 99396 PREV VISIT EST AGE 40-64: CPT | Mod: S$GLB,,, | Performed by: NURSE PRACTITIONER

## 2024-01-19 PROCEDURE — 1160F RVW MEDS BY RX/DR IN RCRD: CPT | Mod: CPTII,S$GLB,, | Performed by: NURSE PRACTITIONER

## 2024-01-19 RX ORDER — METOPROLOL SUCCINATE 25 MG/1
TABLET, EXTENDED RELEASE ORAL
Qty: 135 TABLET | Refills: 1 | Status: SHIPPED | OUTPATIENT
Start: 2024-01-19

## 2024-03-12 ENCOUNTER — PATIENT MESSAGE (OUTPATIENT)
Dept: ADMINISTRATIVE | Facility: HOSPITAL | Age: 49
End: 2024-03-12
Payer: COMMERCIAL

## 2024-03-20 ENCOUNTER — LAB VISIT (OUTPATIENT)
Dept: LAB | Facility: HOSPITAL | Age: 49
End: 2024-03-20
Attending: NURSE PRACTITIONER
Payer: COMMERCIAL

## 2024-03-20 DIAGNOSIS — Z00.00 ROUTINE GENERAL MEDICAL EXAMINATION AT A HEALTH CARE FACILITY: Primary | ICD-10-CM

## 2024-03-20 DIAGNOSIS — Z00.00 ROUTINE GENERAL MEDICAL EXAMINATION AT A HEALTH CARE FACILITY: ICD-10-CM

## 2024-03-20 LAB
ALBUMIN SERPL BCP-MCNC: 4.5 G/DL (ref 3.5–5.2)
ALP SERPL-CCNC: 65 U/L (ref 55–135)
ALT SERPL W/O P-5'-P-CCNC: 30 U/L (ref 10–44)
ANION GAP SERPL CALC-SCNC: 6 MMOL/L (ref 8–16)
AST SERPL-CCNC: 15 U/L (ref 10–40)
BASOPHILS # BLD AUTO: 0.07 K/UL (ref 0–0.2)
BASOPHILS NFR BLD: 0.8 % (ref 0–1.9)
BILIRUB SERPL-MCNC: 0.7 MG/DL (ref 0.1–1)
BILIRUB UR QL STRIP: NEGATIVE
BUN SERPL-MCNC: 20 MG/DL (ref 6–20)
CALCIUM SERPL-MCNC: 9.9 MG/DL (ref 8.7–10.5)
CHLORIDE SERPL-SCNC: 102 MMOL/L (ref 95–110)
CHOLEST SERPL-MCNC: 216 MG/DL (ref 120–199)
CHOLEST/HDLC SERPL: 5.5 {RATIO} (ref 2–5)
CLARITY UR: CLEAR
CO2 SERPL-SCNC: 30 MMOL/L (ref 23–29)
COLOR UR: YELLOW
CREAT SERPL-MCNC: 1.2 MG/DL (ref 0.5–1.4)
DIFFERENTIAL METHOD BLD: ABNORMAL
EOSINOPHIL # BLD AUTO: 0.2 K/UL (ref 0–0.5)
EOSINOPHIL NFR BLD: 1.8 % (ref 0–8)
ERYTHROCYTE [DISTWIDTH] IN BLOOD BY AUTOMATED COUNT: 12.5 % (ref 11.5–14.5)
EST. GFR  (NO RACE VARIABLE): >60 ML/MIN/1.73 M^2
GLUCOSE SERPL-MCNC: 86 MG/DL (ref 70–110)
GLUCOSE UR QL STRIP: NEGATIVE
HCT VFR BLD AUTO: 53.8 % (ref 40–54)
HDLC SERPL-MCNC: 39 MG/DL (ref 40–75)
HDLC SERPL: 18.1 % (ref 20–50)
HGB BLD-MCNC: 16.7 G/DL (ref 14–18)
HGB UR QL STRIP: NEGATIVE
IMM GRANULOCYTES # BLD AUTO: 0.09 K/UL (ref 0–0.04)
IMM GRANULOCYTES NFR BLD AUTO: 1.1 % (ref 0–0.5)
KETONES UR QL STRIP: NEGATIVE
LDLC SERPL CALC-MCNC: 134.4 MG/DL (ref 63–159)
LEUKOCYTE ESTERASE UR QL STRIP: NEGATIVE
LYMPHOCYTES # BLD AUTO: 2.8 K/UL (ref 1–4.8)
LYMPHOCYTES NFR BLD: 32.9 % (ref 18–48)
MCH RBC QN AUTO: 29.1 PG (ref 27–31)
MCHC RBC AUTO-ENTMCNC: 31 G/DL (ref 32–36)
MCV RBC AUTO: 94 FL (ref 82–98)
MONOCYTES # BLD AUTO: 0.9 K/UL (ref 0.3–1)
MONOCYTES NFR BLD: 10.8 % (ref 4–15)
NEUTROPHILS # BLD AUTO: 4.4 K/UL (ref 1.8–7.7)
NEUTROPHILS NFR BLD: 52.6 % (ref 38–73)
NITRITE UR QL STRIP: NEGATIVE
NONHDLC SERPL-MCNC: 177 MG/DL
NRBC BLD-RTO: 0 /100 WBC
PH UR STRIP: 6 [PH] (ref 5–8)
PLATELET # BLD AUTO: 314 K/UL (ref 150–450)
PMV BLD AUTO: 10.5 FL (ref 9.2–12.9)
POTASSIUM SERPL-SCNC: 4.2 MMOL/L (ref 3.5–5.1)
PROT SERPL-MCNC: 7.2 G/DL (ref 6–8.4)
PROT UR QL STRIP: ABNORMAL
RBC # BLD AUTO: 5.74 M/UL (ref 4.6–6.2)
SODIUM SERPL-SCNC: 138 MMOL/L (ref 136–145)
SP GR UR STRIP: 1.03 (ref 1–1.03)
TRIGL SERPL-MCNC: 213 MG/DL (ref 30–150)
TSH SERPL DL<=0.005 MIU/L-ACNC: 0.89 UIU/ML (ref 0.34–5.6)
URN SPEC COLLECT METH UR: ABNORMAL
UROBILINOGEN UR STRIP-ACNC: NEGATIVE EU/DL
WBC # BLD AUTO: 8.35 K/UL (ref 3.9–12.7)

## 2024-03-20 PROCEDURE — 36415 COLL VENOUS BLD VENIPUNCTURE: CPT | Performed by: NURSE PRACTITIONER

## 2024-03-20 PROCEDURE — 85025 COMPLETE CBC W/AUTO DIFF WBC: CPT | Performed by: NURSE PRACTITIONER

## 2024-03-20 PROCEDURE — 81003 URINALYSIS AUTO W/O SCOPE: CPT | Performed by: NURSE PRACTITIONER

## 2024-03-20 PROCEDURE — 80053 COMPREHEN METABOLIC PANEL: CPT | Performed by: NURSE PRACTITIONER

## 2024-03-20 PROCEDURE — 80061 LIPID PANEL: CPT | Performed by: NURSE PRACTITIONER

## 2024-03-20 PROCEDURE — 84443 ASSAY THYROID STIM HORMONE: CPT | Performed by: NURSE PRACTITIONER

## 2024-07-11 ENCOUNTER — TELEPHONE (OUTPATIENT)
Dept: FAMILY MEDICINE | Facility: CLINIC | Age: 49
End: 2024-07-11
Payer: COMMERCIAL

## 2024-07-21 NOTE — PROGRESS NOTES
SUBJECTIVE:      Patient ID: Jem Alvarez is a 49 y.o. male.    Chief Complaint: Hypertension and Colonoscopy (Pt needs a referral for a colonoscopy)    Patient is here today to f/u on htn.  He is taking toprol as prescribed.     Hypertension  This is a chronic problem. The current episode started more than 1 year ago. The problem is unchanged. The problem is controlled. Associated symptoms include headaches. Pertinent negatives include no chest pain, palpitations or shortness of breath. Risk factors for coronary artery disease include male gender. Past treatments include ACE inhibitors and beta blockers. The current treatment provides significant improvement. There are no compliance problems.        Past Surgical History:   Procedure Laterality Date    CYSTOSCOPY N/A 11/3/2022    Procedure: CYSTOSCOPY;  Surgeon: Tre Hutton MD;  Location: [facility] OR;  Service: Urology;  Laterality: N/A;    CYSTOSCOPY W/ URETERAL STENT PLACEMENT Right 10/13/2022    Procedure: CYSTOSCOPY, WITH URETERAL STENT INSERTION;  Surgeon: Tre Hutton MD;  Location: [facility] OR;  Service: Urology;  Laterality: Right;    INSERTION OF URETERAL CATHETER Right 11/3/2022    Procedure: INSERTION, CATHETER, URETER;  Surgeon: Tre Hutton MD;  Location: [facility] OR;  Service: Urology;  Laterality: Right;    REPAIR OF TORSION OF TESTICLE Right     age 15    URETEROSCOPY Right 11/3/2022    Procedure: URETEROSCOPY;  Surgeon: Tre Hutton MD;  Location: [facility] OR;  Service: Urology;  Laterality: Right;    VASECTOMY       Family History   Problem Relation Name Age of Onset    Hypertension Unknown      No Known Problems Mother      Hypertension Father      Arthritis Father      Nephrolithiasis Father      Stroke Maternal Grandmother      No Known Problems Maternal Grandfather      Stroke Paternal Grandmother      No Known Problems Paternal Grandfather      Hodgkin's lymphoma Son          age 17    Diabetes Neg Hx        Social  "History     Socioeconomic History    Marital status:    Occupational History    Occupation:     Occupation: fire department   Tobacco Use    Smoking status: Former     Average packs/day: 0.5 packs/day for 8.8 years (4.4 ttl pk-yrs)     Types: Cigarettes     Start date: 1/1/2015     Passive exposure: Current    Smokeless tobacco: Never   Substance and Sexual Activity    Alcohol use: Yes     Comment: seldom    Drug use: No    Sexual activity: Yes   Social History Narrative    Live with partner     Social Determinants of Health     Financial Resource Strain: Low Risk  (10/12/2022)    Overall Financial Resource Strain (CARDIA)     Difficulty of Paying Living Expenses: Not hard at all   Food Insecurity: No Food Insecurity (10/12/2022)    Hunger Vital Sign     Worried About Running Out of Food in the Last Year: Never true     Ran Out of Food in the Last Year: Never true   Transportation Needs: No Transportation Needs (10/12/2022)    PRAPARE - Transportation     Lack of Transportation (Medical): No     Lack of Transportation (Non-Medical): No   Physical Activity: Sufficiently Active (10/12/2022)    Exercise Vital Sign     Days of Exercise per Week: 7 days     Minutes of Exercise per Session: 60 min   Stress: No Stress Concern Present (10/12/2022)    [employer] - Occupational Stress Questionnaire     Feeling of Stress : Not at all   Housing Stability: Unknown (10/12/2022)    Housing Stability Vital Sign     Unable to Pay for Housing in the Last Year: No     Unstable Housing in the Last Year: No     Current Outpatient Medications   Medication Sig Dispense Refill    metoprolol succinate (TOPROL-XL) 25 MG 24 hr tablet Take 1.5 tabs po daily 135 tablet 1     No current facility-administered medications for this visit.     Review of patient's allergies indicates:   Allergen Reactions    Anectine [succinylcholine chloride] Anaphylaxis     "Stopped breathing and Heart " "stopped" @ age 15  This is NOT a malignant hyperthermia situation.    Anectine dalton-pack       Past Medical History:   Diagnosis Date    Anxiety     Hypertension     not on meds    Kidney stone      Past Surgical History:   Procedure Laterality Date    CYSTOSCOPY N/A 11/3/2022    Procedure: CYSTOSCOPY;  Surgeon: Tre Hutton MD;  Location: [facility] OR;  Service: Urology;  Laterality: N/A;    CYSTOSCOPY W/ URETERAL STENT PLACEMENT Right 10/13/2022    Procedure: CYSTOSCOPY, WITH URETERAL STENT INSERTION;  Surgeon: Tre Hutton MD;  Location: [facility] OR;  Service: Urology;  Laterality: Right;    INSERTION OF URETERAL CATHETER Right 11/3/2022    Procedure: INSERTION, CATHETER, URETER;  Surgeon: Tre Hutton MD;  Location: [facility] OR;  Service: Urology;  Laterality: Right;    REPAIR OF TORSION OF TESTICLE Right     age 15    URETEROSCOPY Right 11/3/2022    Procedure: URETEROSCOPY;  Surgeon: Tre Hutton MD;  Location: [facility] OR;  Service: Urology;  Laterality: Right;    VASECTOMY         Review of Systems   Constitutional:  Negative for appetite change, chills, diaphoresis and unexpected weight change.   HENT:  Negative for ear discharge, facial swelling, hearing loss, nosebleeds and trouble swallowing.    Eyes:  Negative for photophobia, pain and visual disturbance.   Respiratory:  Negative for apnea, choking, shortness of breath and wheezing.    Cardiovascular:  Negative for chest pain and palpitations.   Gastrointestinal:  Negative for abdominal pain, blood in stool and vomiting.   Endocrine: Negative for polyphagia.   Genitourinary:  Negative for difficulty urinating and hematuria.   Musculoskeletal:  Negative for gait problem and joint swelling.   Skin:  Negative for pallor.   Neurological:  Positive for headaches. Negative for dizziness, tremors, seizures, syncope, speech difficulty and weakness.   Hematological:  Does not bruise/bleed easily.   Psychiatric/Behavioral:  Negative for agitation, " "confusion, dysphoric mood, self-injury, sleep disturbance and suicidal ideas. The patient is not nervous/anxious.       OBJECTIVE:      Vitals:    07/22/24 0819   BP: (P) 104/70   Pulse: 73   SpO2: 96%   Weight: 94.8 kg (209 lb)   Height: 5' 6" (1.676 m)     Physical Exam  Vitals and nursing note reviewed.   Constitutional:       General: He is not in acute distress.     Appearance: He is well-developed.   HENT:      Head: Normocephalic and atraumatic.      Nose: Nose normal.      Mouth/Throat:      Pharynx: Uvula midline.   Eyes:      General: Lids are normal.      Conjunctiva/sclera: Conjunctivae normal.      Pupils: Pupils are equal, round, and reactive to light.      Right eye: Pupil is round and reactive.      Left eye: Pupil is round and reactive.   Neck:      Thyroid: No thyromegaly.      Vascular: No carotid bruit.   Cardiovascular:      Rate and Rhythm: Normal rate and regular rhythm.      Pulses: Normal pulses.      Heart sounds: Normal heart sounds. No murmur heard.  Pulmonary:      Effort: Pulmonary effort is normal.      Breath sounds: Normal breath sounds. No wheezing, rhonchi or rales.   Abdominal:      General: Bowel sounds are normal.      Palpations: Abdomen is soft. Abdomen is not rigid.      Tenderness: There is no abdominal tenderness.   Musculoskeletal:         General: Normal range of motion.      Cervical back: Normal range of motion and neck supple.      Right lower leg: No edema.      Left lower leg: No edema.   Lymphadenopathy:      Cervical: No cervical adenopathy.   Skin:     General: Skin is warm and dry.      Nails: There is no clubbing.   Neurological:      Mental Status: He is alert and oriented to person, place, and time.   Psychiatric:         Mood and Affect: Mood normal.         Speech: Speech normal.         Behavior: Behavior normal. Behavior is cooperative.         Thought Content: Thought content normal.         Judgment: Judgment normal.          No visits with results " within 1 Month(s) from this visit.   Latest known visit with results is:   Lab Visit on 03/20/2024   Component Date Value Ref Range Status    WBC 03/20/2024 8.35  3.90 - 12.70 K/uL Final    RBC 03/20/2024 5.74  4.60 - 6.20 M/uL Final    Hemoglobin 03/20/2024 16.7  14.0 - 18.0 g/dL Final    Hematocrit 03/20/2024 53.8  40.0 - 54.0 % Final    MCV 03/20/2024 94  82 - 98 fL Final    MCH 03/20/2024 29.1  27.0 - 31.0 pg Final    MCHC 03/20/2024 31.0 (L)  32.0 - 36.0 g/dL Final    RDW 03/20/2024 12.5  11.5 - 14.5 % Final    Platelets 03/20/2024 314  150 - 450 K/uL Final    MPV 03/20/2024 10.5  9.2 - 12.9 fL Final    Immature Granulocytes 03/20/2024 1.1 (H)  0.0 - 0.5 % Final    Gran # (ANC) 03/20/2024 4.4  1.8 - 7.7 K/uL Final    Immature Grans (Abs) 03/20/2024 0.09 (H)  0.00 - 0.04 K/uL Final    Comment: Mild elevation in immature granulocytes is non specific and   can be seen in a variety of conditions including stress response,   acute inflammation, trauma and pregnancy. Correlation with other   laboratory and clinical findings is essential.      Lymph # 03/20/2024 2.8  1.0 - 4.8 K/uL Final    Mono # 03/20/2024 0.9  0.3 - 1.0 K/uL Final    Eos # 03/20/2024 0.2  0.0 - 0.5 K/uL Final    Baso # 03/20/2024 0.07  0.00 - 0.20 K/uL Final    nRBC 03/20/2024 0  0 /100 WBC Final    Gran % 03/20/2024 52.6  38.0 - 73.0 % Final    Lymph % 03/20/2024 32.9  18.0 - 48.0 % Final    Mono % 03/20/2024 10.8  4.0 - 15.0 % Final    Eosinophil % 03/20/2024 1.8  0.0 - 8.0 % Final    Basophil % 03/20/2024 0.8  0.0 - 1.9 % Final    Differential Method 03/20/2024 Automated   Final    Sodium 03/20/2024 138  136 - 145 mmol/L Final    Potassium 03/20/2024 4.2  3.5 - 5.1 mmol/L Final    Chloride 03/20/2024 102  95 - 110 mmol/L Final    CO2 03/20/2024 30 (H)  23 - 29 mmol/L Final    Glucose 03/20/2024 86  70 - 110 mg/dL Final    BUN 03/20/2024 20  6 - 20 mg/dL Final    Creatinine 03/20/2024 1.2  0.5 - 1.4 mg/dL Final    Calcium 03/20/2024 9.9  8.7 -  10.5 mg/dL Final    Total Protein 03/20/2024 7.2  6.0 - 8.4 g/dL Final    Albumin 03/20/2024 4.5  3.5 - 5.2 g/dL Final    Total Bilirubin 03/20/2024 0.7  0.1 - 1.0 mg/dL Final    Comment: For infants and newborns, interpretation of results should be based  on gestational age, weight and in agreement with clinical  observations.    Premature Infant recommended reference ranges:  Up to 24 hours.............<8.0 mg/dL  Up to 48 hours............<12.0 mg/dL  3-5 days..................<15.0 mg/dL  6-29 days.................<15.0 mg/dL      Alkaline Phosphatase 03/20/2024 65  55 - 135 U/L Final    AST 03/20/2024 15  10 - 40 U/L Final    ALT 03/20/2024 30  10 - 44 U/L Final    eGFR 03/20/2024 >60.0  >60 mL/min/1.73 m^2 Final    Anion Gap 03/20/2024 6 (L)  8 - 16 mmol/L Final    Cholesterol 03/20/2024 216 (H)  120 - 199 mg/dL Final    Comment: The National Cholesterol Education Program (NCEP) has set the  following guidelines (reference ranges) for Cholesterol:  Optimal.....................<200 mg/dL  Borderline High.............200-239 mg/dL  High........................> or = 240 mg/dL      Triglycerides 03/20/2024 213 (H)  30 - 150 mg/dL Final    Comment: The National Cholesterol Education Program (NCEP) has set the  following guidelines (reference values) for triglycerides:  Normal......................<150 mg/dL  Borderline High.............150-199 mg/dL  High........................200-499 mg/dL      HDL 03/20/2024 39 (L)  40 - 75 mg/dL Final    Comment: The National Cholesterol Education Program (NCEP) has set the  following guidelines (reference values) for HDL Cholesterol:  Low...............<40 mg/dL  Optimal...........>60 mg/dL      LDL Cholesterol 03/20/2024 134.4  63.0 - 159.0 mg/dL Final    Comment: The National Cholesterol Education Program (NCEP) has set the  following guidelines (reference values) for LDL Cholesterol:  Optimal.......................<130 mg/dL  Borderline High...............130-159  mg/dL  High..........................160-189 mg/dL  Very High.....................>190 mg/dL      HDL/Cholesterol Ratio 03/20/2024 18.1 (L)  20.0 - 50.0 % Final    Total Cholesterol/HDL Ratio 03/20/2024 5.5 (H)  2.0 - 5.0 Final    Non-HDL Cholesterol 03/20/2024 177  mg/dL Final    Comment: Risk category and Non-HDL cholesterol goals:  Coronary heart disease (CHD)or equivalent (10-year risk of CHD >20%):  Non-HDL cholesterol goal     <130 mg/dL  Two or more CHD risk factors and 10-year risk of CHD <= 20%:  Non-HDL cholesterol goal     <160 mg/dL  0 to 1 CHD risk factor:  Non-HDL cholesterol goal     <190 mg/dL      TSH 03/20/2024 0.886  0.340 - 5.600 uIU/mL Final    Specimen UA 03/20/2024 Urine, Clean Catch   Final    Color, UA 03/20/2024 Yellow  Yellow, Straw, Kim Final    Appearance, UA 03/20/2024 Clear  Clear Final    pH, UA 03/20/2024 6.0  5.0 - 8.0 Final    Specific Gravity, UA 03/20/2024 1.030  1.005 - 1.030 Final    Protein, UA 03/20/2024 Trace (A)  Negative Final    Comment: Recommend a 24 hour urine protein or a urine   protein/creatinine ratio if globulin induced proteinuria is  clinically suspected.      Glucose, UA 03/20/2024 Negative  Negative Final    Ketones, UA 03/20/2024 Negative  Negative Final    Bilirubin (UA) 03/20/2024 Negative  Negative Final    Occult Blood UA 03/20/2024 Negative  Negative Final    Nitrite, UA 03/20/2024 Negative  Negative Final    Urobilinogen, UA 03/20/2024 Negative  Negative EU/dL Final    Leukocytes, UA 03/20/2024 Negative  Negative Final   ]  Last visit note, most recent available labs, and health maintenance reviewed    Assessment:       1. Essential hypertension    2. Mixed hyperlipidemia    3. Screen for colon cancer        Plan:       Essential hypertension  -     metoprolol succinate (TOPROL-XL) 25 MG 24 hr tablet; Take 1.5 tabs po daily  Dispense: 135 tablet; Refill: 1  -     Comprehensive Metabolic Panel; Future; Expected date: 10/22/2024  -     Lipid Panel;  Future; Expected date: 10/22/2024  -     Magnesium; Future; Expected date: 10/22/2024    Mixed hyperlipidemia  -     Comprehensive Metabolic Panel; Future; Expected date: 10/22/2024  -     Lipid Panel; Future; Expected date: 10/22/2024  -     Magnesium; Future; Expected date: 10/22/2024  The 10-year ASCVD risk score (Florina CHRISTIAN, et al., 2019) is: 3.1%    Values used to calculate the score:      Age: 49 years      Sex: Male      Is Non- : No      Diabetic: No      Tobacco smoker: No      Systolic Blood Pressure: 104 mmHg      Is BP treated: Yes      HDL Cholesterol: 39 mg/dL      Total Cholesterol: 216 mg/dL  Cont diet and exercise    Screen for colon cancer  -     Ambulatory referral/consult to Gastroenterology; Future; Expected date: 07/22/2024          Follow up in about 6 months (around 1/22/2025) for wellness.      7/22/2024 JOLENE Ibrahim, FNP

## 2024-07-22 ENCOUNTER — OFFICE VISIT (OUTPATIENT)
Dept: FAMILY MEDICINE | Facility: CLINIC | Age: 49
End: 2024-07-22
Payer: COMMERCIAL

## 2024-07-22 ENCOUNTER — PATIENT MESSAGE (OUTPATIENT)
Dept: GASTROENTEROLOGY | Facility: CLINIC | Age: 49
End: 2024-07-22
Payer: COMMERCIAL

## 2024-07-22 VITALS — WEIGHT: 209 LBS | HEART RATE: 73 BPM | BODY MASS INDEX: 33.59 KG/M2 | HEIGHT: 66 IN | OXYGEN SATURATION: 96 %

## 2024-07-22 DIAGNOSIS — I10 ESSENTIAL HYPERTENSION: Primary | ICD-10-CM

## 2024-07-22 DIAGNOSIS — Z12.11 SCREEN FOR COLON CANCER: ICD-10-CM

## 2024-07-22 DIAGNOSIS — E78.2 MIXED HYPERLIPIDEMIA: ICD-10-CM

## 2024-07-22 PROCEDURE — 99213 OFFICE O/P EST LOW 20 MIN: CPT | Mod: S$GLB,,, | Performed by: NURSE PRACTITIONER

## 2024-07-22 PROCEDURE — 1159F MED LIST DOCD IN RCRD: CPT | Mod: CPTII,S$GLB,, | Performed by: NURSE PRACTITIONER

## 2024-07-22 PROCEDURE — 3008F BODY MASS INDEX DOCD: CPT | Mod: CPTII,S$GLB,, | Performed by: NURSE PRACTITIONER

## 2024-07-22 PROCEDURE — 1160F RVW MEDS BY RX/DR IN RCRD: CPT | Mod: CPTII,S$GLB,, | Performed by: NURSE PRACTITIONER

## 2024-07-22 RX ORDER — METOPROLOL SUCCINATE 25 MG/1
TABLET, EXTENDED RELEASE ORAL
Qty: 135 TABLET | Refills: 1 | Status: SHIPPED | OUTPATIENT
Start: 2024-07-22

## 2024-07-30 ENCOUNTER — TELEPHONE (OUTPATIENT)
Dept: GASTROENTEROLOGY | Facility: CLINIC | Age: 49
End: 2024-07-30
Payer: COMMERCIAL

## 2024-07-30 NOTE — TELEPHONE ENCOUNTER
Call placed to Mr. Parish in regards to referral received for a screening colonoscopy. No answer, unable to leave message due to mailbox being full.

## 2025-01-07 ENCOUNTER — PATIENT MESSAGE (OUTPATIENT)
Dept: FAMILY MEDICINE | Facility: CLINIC | Age: 50
End: 2025-01-07
Payer: COMMERCIAL

## 2025-01-07 ENCOUNTER — LAB VISIT (OUTPATIENT)
Dept: LAB | Facility: HOSPITAL | Age: 50
End: 2025-01-07
Attending: NURSE PRACTITIONER
Payer: COMMERCIAL

## 2025-01-07 DIAGNOSIS — I10 ESSENTIAL HYPERTENSION: ICD-10-CM

## 2025-01-07 DIAGNOSIS — E78.2 MIXED HYPERLIPIDEMIA: ICD-10-CM

## 2025-01-07 LAB
ALBUMIN SERPL BCP-MCNC: 4.4 G/DL (ref 3.5–5.2)
ALP SERPL-CCNC: 75 U/L (ref 55–135)
ALT SERPL W/O P-5'-P-CCNC: 41 U/L (ref 10–44)
ANION GAP SERPL CALC-SCNC: 4 MMOL/L (ref 8–16)
AST SERPL-CCNC: 17 U/L (ref 10–40)
BILIRUB SERPL-MCNC: 0.4 MG/DL (ref 0.1–1)
BUN SERPL-MCNC: 14 MG/DL (ref 6–20)
CALCIUM SERPL-MCNC: 9.6 MG/DL (ref 8.7–10.5)
CHLORIDE SERPL-SCNC: 103 MMOL/L (ref 95–110)
CHOLEST SERPL-MCNC: 216 MG/DL (ref 120–199)
CHOLEST/HDLC SERPL: 5.4 {RATIO} (ref 2–5)
CO2 SERPL-SCNC: 32 MMOL/L (ref 23–29)
CREAT SERPL-MCNC: 1 MG/DL (ref 0.5–1.4)
EST. GFR  (NO RACE VARIABLE): >60 ML/MIN/1.73 M^2
GLUCOSE SERPL-MCNC: 86 MG/DL (ref 70–110)
HDLC SERPL-MCNC: 40 MG/DL (ref 40–75)
HDLC SERPL: 18.5 % (ref 20–50)
LDLC SERPL CALC-MCNC: 141 MG/DL (ref 63–159)
MAGNESIUM SERPL-MCNC: 2 MG/DL (ref 1.6–2.6)
NONHDLC SERPL-MCNC: 176 MG/DL
POTASSIUM SERPL-SCNC: 4 MMOL/L (ref 3.5–5.1)
PROT SERPL-MCNC: 7.2 G/DL (ref 6–8.4)
SODIUM SERPL-SCNC: 139 MMOL/L (ref 136–145)
TRIGL SERPL-MCNC: 175 MG/DL (ref 30–150)

## 2025-01-07 PROCEDURE — 80061 LIPID PANEL: CPT | Performed by: NURSE PRACTITIONER

## 2025-01-07 PROCEDURE — 36415 COLL VENOUS BLD VENIPUNCTURE: CPT | Performed by: NURSE PRACTITIONER

## 2025-01-07 PROCEDURE — 80053 COMPREHEN METABOLIC PANEL: CPT | Performed by: NURSE PRACTITIONER

## 2025-01-07 PROCEDURE — 83735 ASSAY OF MAGNESIUM: CPT | Performed by: NURSE PRACTITIONER

## 2025-01-13 DIAGNOSIS — I10 ESSENTIAL HYPERTENSION: ICD-10-CM

## 2025-01-13 RX ORDER — METOPROLOL SUCCINATE 25 MG/1
TABLET, EXTENDED RELEASE ORAL
Qty: 135 TABLET | Refills: 0 | Status: SHIPPED | OUTPATIENT
Start: 2025-01-13 | End: 2025-01-29 | Stop reason: SDUPTHER

## 2025-01-29 ENCOUNTER — OFFICE VISIT (OUTPATIENT)
Dept: FAMILY MEDICINE | Facility: CLINIC | Age: 50
End: 2025-01-29
Payer: COMMERCIAL

## 2025-01-29 VITALS
DIASTOLIC BLOOD PRESSURE: 80 MMHG | SYSTOLIC BLOOD PRESSURE: 118 MMHG | HEART RATE: 87 BPM | OXYGEN SATURATION: 96 % | BODY MASS INDEX: 34.39 KG/M2 | HEIGHT: 66 IN | WEIGHT: 214 LBS

## 2025-01-29 DIAGNOSIS — E78.2 MIXED HYPERLIPIDEMIA: ICD-10-CM

## 2025-01-29 DIAGNOSIS — Z00.00 PREVENTATIVE HEALTH CARE: Primary | ICD-10-CM

## 2025-01-29 DIAGNOSIS — I10 ESSENTIAL HYPERTENSION: ICD-10-CM

## 2025-01-29 DIAGNOSIS — Z13.6 ENCOUNTER FOR SCREENING FOR CARDIOVASCULAR DISORDERS: ICD-10-CM

## 2025-01-29 PROCEDURE — 3008F BODY MASS INDEX DOCD: CPT | Mod: CPTII,S$GLB,, | Performed by: NURSE PRACTITIONER

## 2025-01-29 PROCEDURE — 99396 PREV VISIT EST AGE 40-64: CPT | Mod: S$GLB,,, | Performed by: NURSE PRACTITIONER

## 2025-01-29 PROCEDURE — 3074F SYST BP LT 130 MM HG: CPT | Mod: CPTII,S$GLB,, | Performed by: NURSE PRACTITIONER

## 2025-01-29 PROCEDURE — 1159F MED LIST DOCD IN RCRD: CPT | Mod: CPTII,S$GLB,, | Performed by: NURSE PRACTITIONER

## 2025-01-29 PROCEDURE — 3079F DIAST BP 80-89 MM HG: CPT | Mod: CPTII,S$GLB,, | Performed by: NURSE PRACTITIONER

## 2025-01-29 PROCEDURE — 1160F RVW MEDS BY RX/DR IN RCRD: CPT | Mod: CPTII,S$GLB,, | Performed by: NURSE PRACTITIONER

## 2025-01-29 RX ORDER — METOPROLOL SUCCINATE 25 MG/1
TABLET, EXTENDED RELEASE ORAL
Qty: 135 TABLET | Refills: 1 | Status: SHIPPED | OUTPATIENT
Start: 2025-01-29

## 2025-01-29 NOTE — PROGRESS NOTES
SUBJECTIVE:      Patient ID: Jem Alvarez is a 49 y.o. male.    Chief Complaint: Annual Exam    Patient is here today for an annual exam.      Hypertension  This is a chronic problem. The current episode started more than 1 year ago. The problem is unchanged. The problem is controlled. Pertinent negatives include no chest pain, headaches, palpitations or shortness of breath. Risk factors for coronary artery disease include male gender. Past treatments include ACE inhibitors. There are no compliance problems.        Past Surgical History:   Procedure Laterality Date    CYSTOSCOPY N/A 11/3/2022    Procedure: CYSTOSCOPY;  Surgeon: Tre Hutton MD;  Location: [facility];  Service: Urology;  Laterality: N/A;    CYSTOSCOPY W/ URETERAL STENT PLACEMENT Right 10/13/2022    Procedure: CYSTOSCOPY, WITH URETERAL STENT INSERTION;  Surgeon: Tre Hutton MD;  Location: [facility] OR;  Service: Urology;  Laterality: Right;    INSERTION OF URETERAL CATHETER Right 11/3/2022    Procedure: INSERTION, CATHETER, URETER;  Surgeon: Tre Hutton MD;  Location: [facility] OR;  Service: Urology;  Laterality: Right;    REPAIR OF TORSION OF TESTICLE Right     age 15    URETEROSCOPY Right 11/3/2022    Procedure: URETEROSCOPY;  Surgeon: Tre Hutton MD;  Location: [facility] OR;  Service: Urology;  Laterality: Right;    VASECTOMY       Family History   Problem Relation Name Age of Onset    Hypertension Unknown      No Known Problems Mother      Hypertension Father      Arthritis Father      Nephrolithiasis Father      Stroke Maternal Grandmother      No Known Problems Maternal Grandfather      Stroke Paternal Grandmother      No Known Problems Paternal Grandfather      Hodgkin's lymphoma Son          age 17    Diabetes Neg Hx        Social History     Socioeconomic History    Marital status:    Occupational History    Occupation:     Occupation: fire department   Tobacco Use    Smoking status:  "Former     Average packs/day: 0.5 packs/day for 8.8 years (4.4 ttl pk-yrs)     Types: Cigarettes     Start date: 1/1/2015     Passive exposure: Current    Smokeless tobacco: Never   Substance and Sexual Activity    Alcohol use: Yes     Comment: seldom    Drug use: No    Sexual activity: Yes   Social History Narrative    Live with partner     Social Drivers of Health     Financial Resource Strain: Low Risk  (10/12/2022)    Overall Financial Resource Strain (CARDIA)     Difficulty of Paying Living Expenses: Not hard at all   Food Insecurity: No Food Insecurity (10/12/2022)    Hunger Vital Sign     Worried About Running Out of Food in the Last Year: Never true     Ran Out of Food in the Last Year: Never true   Transportation Needs: No Transportation Needs (10/12/2022)    PRAPARE - Transportation     Lack of Transportation (Medical): No     Lack of Transportation (Non-Medical): No   Physical Activity: Sufficiently Active (10/12/2022)    Exercise Vital Sign     Days of Exercise per Week: 7 days     Minutes of Exercise per Session: 60 min   Stress: No Stress Concern Present (10/12/2022)    [employer] [place] [employer] - Occupational Stress Questionnaire     Feeling of Stress : Not at all   Housing Stability: Unknown (10/12/2022)    Housing Stability Vital Sign     Unable to Pay for Housing in the Last Year: No     Unstable Housing in the Last Year: No     Current Outpatient Medications   Medication Sig Dispense Refill    metoprolol succinate (TOPROL-XL) 25 MG 24 hr tablet Take 1.5 tabs po daily 135 tablet 01     No current facility-administered medications for this visit.     Review of patient's allergies indicates:   Allergen Reactions    Anectine [succinylcholine chloride] Anaphylaxis     "Stopped breathing and Heart stopped" @ age 15  This is NOT a malignant hyperthermia situation.    Anectine dalton-pack       Past Medical History:   Diagnosis Date    Anxiety     Hypertension     not on meds    Kidney stone  "     Past Surgical History:   Procedure Laterality Date    CYSTOSCOPY N/A 11/3/2022    Procedure: CYSTOSCOPY;  Surgeon: Tre Hutton MD;  Location: [facility] OR;  Service: Urology;  Laterality: N/A;    CYSTOSCOPY W/ URETERAL STENT PLACEMENT Right 10/13/2022    Procedure: CYSTOSCOPY, WITH URETERAL STENT INSERTION;  Surgeon: Tre Hutton MD;  Location: [facility] OR;  Service: Urology;  Laterality: Right;    INSERTION OF URETERAL CATHETER Right 11/3/2022    Procedure: INSERTION, CATHETER, URETER;  Surgeon: Tre Hutton MD;  Location: [facility] OR;  Service: Urology;  Laterality: Right;    REPAIR OF TORSION OF TESTICLE Right     age 15    URETEROSCOPY Right 11/3/2022    Procedure: URETEROSCOPY;  Surgeon: Tre Hutton MD;  Location: [facility] OR;  Service: Urology;  Laterality: Right;    VASECTOMY         Review of Systems   Constitutional:  Negative for appetite change, chills, diaphoresis and unexpected weight change.   HENT:  Negative for ear discharge, facial swelling, hearing loss, nosebleeds and trouble swallowing.    Eyes:  Negative for photophobia, pain and visual disturbance.   Respiratory:  Negative for apnea, choking, shortness of breath and wheezing.    Cardiovascular:  Negative for chest pain and palpitations.   Gastrointestinal:  Negative for abdominal distention, abdominal pain, blood in stool and vomiting.   Endocrine: Negative for polyphagia.   Genitourinary:  Negative for difficulty urinating and hematuria.   Musculoskeletal:  Negative for gait problem and joint swelling.   Skin:  Negative for pallor.   Neurological:  Negative for dizziness, seizures, speech difficulty and headaches.   Hematological:  Does not bruise/bleed easily.   Psychiatric/Behavioral:  Negative for agitation, confusion, self-injury, sleep disturbance and suicidal ideas. The patient is not nervous/anxious.       OBJECTIVE:      Vitals:    01/29/25 1009   BP: 118/80   Pulse: 87   SpO2: 96%   Weight: 97.1 kg (214 lb)   Height:  "5' 6" (1.676 m)     Physical Exam  Vitals and nursing note reviewed.   Constitutional:       General: He is not in acute distress.     Appearance: He is well-developed.   HENT:      Head: Normocephalic and atraumatic.      Nose: Nose normal.      Mouth/Throat:      Pharynx: Uvula midline.   Eyes:      General: Lids are normal.      Conjunctiva/sclera: Conjunctivae normal.      Pupils: Pupils are equal, round, and reactive to light.      Right eye: Pupil is round and reactive.      Left eye: Pupil is round and reactive.   Neck:      Thyroid: No thyromegaly.      Vascular: No carotid bruit.   Cardiovascular:      Rate and Rhythm: Normal rate and regular rhythm.      Pulses: Normal pulses.      Heart sounds: Normal heart sounds. No murmur heard.  Pulmonary:      Effort: Pulmonary effort is normal.      Breath sounds: Normal breath sounds. No wheezing or rhonchi.   Abdominal:      General: Bowel sounds are normal.      Palpations: Abdomen is soft. Abdomen is not rigid.      Tenderness: There is no abdominal tenderness.   Musculoskeletal:         General: Normal range of motion.      Cervical back: Normal range of motion and neck supple.      Right lower leg: No edema.      Left lower leg: No edema.   Lymphadenopathy:      Cervical: No cervical adenopathy.   Skin:     General: Skin is warm and dry.      Nails: There is no clubbing.   Neurological:      Mental Status: He is alert and oriented to person, place, and time.   Psychiatric:         Mood and Affect: Mood normal.         Speech: Speech normal.         Behavior: Behavior normal. Behavior is cooperative.         Thought Content: Thought content normal.         Judgment: Judgment normal.          Last visit note, most recent available labs, and health maintenance reviewed    Lab Visit on 01/07/2025   Component Date Value Ref Range Status    Sodium 01/07/2025 139  136 - 145 mmol/L Final    Potassium 01/07/2025 4.0  3.5 - 5.1 mmol/L Final    Chloride 01/07/2025 103  " 95 - 110 mmol/L Final    CO2 01/07/2025 32 (H)  23 - 29 mmol/L Final    Glucose 01/07/2025 86  70 - 110 mg/dL Final    BUN 01/07/2025 14  6 - 20 mg/dL Final    Creatinine 01/07/2025 1.0  0.5 - 1.4 mg/dL Final    Calcium 01/07/2025 9.6  8.7 - 10.5 mg/dL Final    Total Protein 01/07/2025 7.2  6.0 - 8.4 g/dL Final    Albumin 01/07/2025 4.4  3.5 - 5.2 g/dL Final    Total Bilirubin 01/07/2025 0.4  0.1 - 1.0 mg/dL Final    Comment: For infants and newborns, interpretation of results should be based  on gestational age, weight and in agreement with clinical  observations.    Premature Infant recommended reference ranges:  Up to 24 hours.............<8.0 mg/dL  Up to 48 hours............<12.0 mg/dL  3-5 days..................<15.0 mg/dL  6-29 days.................<15.0 mg/dL      Alkaline Phosphatase 01/07/2025 75  55 - 135 U/L Final    AST 01/07/2025 17  10 - 40 U/L Final    ALT 01/07/2025 41  10 - 44 U/L Final    eGFR 01/07/2025 >60.0  >60 mL/min/1.73 m^2 Final    Anion Gap 01/07/2025 4 (L)  8 - 16 mmol/L Final    Cholesterol 01/07/2025 216 (H)  120 - 199 mg/dL Final    Comment: The National Cholesterol Education Program (NCEP) has set the  following guidelines (reference ranges) for Cholesterol:  Optimal.....................<200 mg/dL  Borderline High.............200-239 mg/dL  High........................> or = 240 mg/dL      Triglycerides 01/07/2025 175 (H)  30 - 150 mg/dL Final    Comment: The National Cholesterol Education Program (NCEP) has set the  following guidelines (reference values) for triglycerides:  Normal......................<150 mg/dL  Borderline High.............150-199 mg/dL  High........................200-499 mg/dL      HDL 01/07/2025 40  40 - 75 mg/dL Final    Comment: The National Cholesterol Education Program (NCEP) has set the  following guidelines (reference values) for HDL Cholesterol:  Low...............<40 mg/dL  Optimal...........>60 mg/dL      LDL Cholesterol 01/07/2025 141.0  63.0 - 159.0  mg/dL Final    Comment: The National Cholesterol Education Program (NCEP) has set the  following guidelines (reference values) for LDL Cholesterol:  Optimal.......................<130 mg/dL  Borderline High...............130-159 mg/dL  High..........................160-189 mg/dL  Very High.....................>190 mg/dL      HDL/Cholesterol Ratio 01/07/2025 18.5 (L)  20.0 - 50.0 % Final    Total Cholesterol/HDL Ratio 01/07/2025 5.4 (H)  2.0 - 5.0 Final    Non-HDL Cholesterol 01/07/2025 176  mg/dL Final    Comment: Risk category and Non-HDL cholesterol goals:  Coronary heart disease (CHD)or equivalent (10-year risk of CHD >20%):  Non-HDL cholesterol goal     <130 mg/dL  Two or more CHD risk factors and 10-year risk of CHD <= 20%:  Non-HDL cholesterol goal     <160 mg/dL  0 to 1 CHD risk factor:  Non-HDL cholesterol goal     <190 mg/dL      Magnesium 01/07/2025 2.0  1.6 - 2.6 mg/dL Final   ]  Assessment:       1. Preventative health care    2. Essential hypertension    3. Mixed hyperlipidemia    4. Encounter for screening for cardiovascular disorders        Plan:       Preventative health care  -     CBC Auto Differential; Future; Expected date: 04/03/2025  -     Comprehensive Metabolic Panel; Future; Expected date: 04/03/2025  -     Lipid Panel; Future; Expected date: 04/03/2025  -     TSH; Future; Expected date: 04/03/2025  -     Urinalysis; Future; Expected date: 04/03/2025  Counseled on age and gender appropriate medical preventative services, including cancer screenings, immunizations, overall nutritional health, need for a consistent exercise regimen and an overall push towards maintaining a vigorous and active lifestyle.   Pt reminded to schedule colonoscopy    Essential hypertension  -     metoprolol succinate (TOPROL-XL) 25 MG 24 hr tablet; Take 1.5 tabs po daily  Dispense: 135 tablet; Refill: 01    Mixed hyperlipidemia  -     CT Cardiac Scoring; Future; Expected date: 01/29/2025    Encounter for screening  for cardiovascular disorders  -     CT Cardiac Scoring; Future; Expected date: 01/29/2025      The 10-year ASCVD risk score (Florina DK, et al., 2019) is: 3.8%    Values used to calculate the score:      Age: 49 years      Sex: Male      Is Non- : No      Diabetic: No      Tobacco smoker: No      Systolic Blood Pressure: 118 mmHg      Is BP treated: Yes      HDL Cholesterol: 40 mg/dL      Total Cholesterol: 216 mg/dL    Follow up in about 6 months (around 7/29/2025) for htn .      1/29/2025 JOLENE Ibrahim, FNP

## 2025-04-04 ENCOUNTER — ON-DEMAND VIRTUAL (OUTPATIENT)
Dept: URGENT CARE | Facility: CLINIC | Age: 50
End: 2025-04-04
Payer: COMMERCIAL

## 2025-04-04 DIAGNOSIS — J32.9 SINUSITIS, UNSPECIFIED CHRONICITY, UNSPECIFIED LOCATION: Primary | ICD-10-CM

## 2025-04-04 RX ORDER — AMOXICILLIN 875 MG/1
875 TABLET, FILM COATED ORAL EVERY 12 HOURS
Qty: 20 TABLET | Refills: 0 | Status: SHIPPED | OUTPATIENT
Start: 2025-04-04 | End: 2025-04-14

## 2025-04-04 NOTE — PATIENT INSTRUCTIONS
You have been diagnosed with a sinus infection.   I have prescribed and antibiotic. Please start today and take until finished.   You can also take over the counter meds such as Flonase, Claritin, Dayquil etc.     If your symptoms persist please follow up with your pcp.  If you experience any severe symptoms go to the ER.      Thank you for choosing [facility]!    Our goal in the [facility] to always provide outstanding medical care. You may receive a survey by mail or e-mail in the next week regarding your experience today. We would greatly appreciate you completing and returning the survey. Your feedback provides us with a way to recognize our staff who provide very good care, and it helps us learn how to improve when your experience was below our aspiration of excellence.         We appreciate you trusting us with your medical care. We hope you feel better soon. We will be happy to take care of you for all of your future medical needs.    You must understand that you've received Virtual  treatment only and that you may be released before all your medical problems are known or treated. You, the patient, will arrange for follow up care as instructed.    Follow up with your PCP or specialty clinic as directed in the next 1-2 weeks if not improved or as needed.  You can call [phone] to schedule an appointment with the appropriate provider.    If your condition worsens we recommend that you receive another evaluation in person, with your primary care provider, urgent care or at the emergency room immediately or contact your primary medical clinics after hours call service to discuss your concerns.

## 2025-04-04 NOTE — PROGRESS NOTES
"Subjective:      Patient ID: Jem Alvarez is a 49 y.o. male.    Vitals:  vitals were not taken for this visit.     Chief Complaint: Sinus Problem      Visit Type: TELE AUDIOVISUAL    Patient Location: Home     Present with the patient at the time of consultation: TELEMED PRESENT WITH PATIENT: None    Past Medical History:   Diagnosis Date    Anxiety     Hypertension     not on meds    Kidney stone      Past Surgical History:   Procedure Laterality Date    CYSTOSCOPY N/A 11/3/2022    Procedure: CYSTOSCOPY;  Surgeon: Tre Hutton MD;  Location: [facility];  Service: Urology;  Laterality: N/A;    CYSTOSCOPY W/ URETERAL STENT PLACEMENT Right 10/13/2022    Procedure: CYSTOSCOPY, WITH URETERAL STENT INSERTION;  Surgeon: Tre Hutton MD;  Location: [facility] OR;  Service: Urology;  Laterality: Right;    INSERTION OF URETERAL CATHETER Right 11/3/2022    Procedure: INSERTION, CATHETER, URETER;  Surgeon: Tre Hutton MD;  Location: [facility] OR;  Service: Urology;  Laterality: Right;    REPAIR OF TORSION OF TESTICLE Right     age 15    URETEROSCOPY Right 11/3/2022    Procedure: URETEROSCOPY;  Surgeon: Tre Hutton MD;  Location: [facility];  Service: Urology;  Laterality: Right;    VASECTOMY       Review of patient's allergies indicates:   Allergen Reactions    Anectine [succinylcholine chloride] Anaphylaxis     "Stopped breathing and Heart stopped" @ age 15  This is NOT a malignant hyperthermia situation.    Anectine dalton-pack      Medications Ordered Prior to Encounter[1]  Family History   Problem Relation Name Age of Onset    Hypertension Unknown      No Known Problems Mother      Hypertension Father      Arthritis Father      Nephrolithiasis Father      Stroke Maternal Grandmother      No Known Problems Maternal Grandfather      Stroke Paternal Grandmother      No Known Problems Paternal Grandfather      Hodgkin's lymphoma Son          age 17    Diabetes Neg Hx         Medications Ordered              "   [facility] - [place], [place] - [address]   [address], [place] [address]    Telephone: [phone]   Fax: [phone]   Hours: Not open 24 hours                         E-Prescribed (1 of 1)              amoxicillin (AMOXIL) 875 MG tablet    Sig: Take 1 tablet (875 mg total) by mouth every 12 (twelve) hours. for 10 days       Start: 4/4/25     Quantity: 20 tablet Refills: 0                           Ohs Peq Odvv Intake    4/4/2025  7:31 AM CDT - Filed by Patient   What is your current physical address in the event of a medical emergency? [id number]   Are you able to take your vital signs? No   Please attach any relevant images or files    Is your employer contracted with [facility]? No         Sinus Problem  This is a new problem. Episode onset: sinus congestion from allergies for 2 weeks now with 2 days of sinus pain. The problem is unchanged. There has been no fever. Associated symptoms include congestion, coughing, headaches, sinus pressure and sneezing. Pertinent negatives include no hoarse voice or sore throat. Treatments tried: sinex. The treatment provided mild relief.       HENT:  Positive for congestion and sinus pressure. Negative for sore throat.    Respiratory:  Positive for cough.    Allergic/Immunologic: Positive for sneezing.   Neurological:  Positive for headaches.        Objective:   The physical exam was conducted virtually.  Physical Exam    Assessment:     1. Sinusitis, unspecified chronicity, unspecified location        Plan:       Sinusitis, unspecified chronicity, unspecified location    Other orders  -     amoxicillin (AMOXIL) 875 MG tablet; Take 1 tablet (875 mg total) by mouth every 12 (twelve) hours. for 10 days  Dispense: 20 tablet; Refill: 0                          [1]   Current Outpatient Medications on File Prior to Visit   Medication Sig Dispense Refill    metoprolol succinate (TOPROL-XL) 25 MG 24 hr tablet Take 1.5 tabs po daily 135  tablet 01     No current facility-administered medications on file prior to visit.

## 2025-04-25 ENCOUNTER — PATIENT MESSAGE (OUTPATIENT)
Dept: FAMILY MEDICINE | Facility: CLINIC | Age: 50
End: 2025-04-25
Payer: COMMERCIAL

## 2025-04-28 ENCOUNTER — OFFICE VISIT (OUTPATIENT)
Dept: FAMILY MEDICINE | Facility: CLINIC | Age: 50
End: 2025-04-28
Payer: COMMERCIAL

## 2025-04-28 ENCOUNTER — TELEPHONE (OUTPATIENT)
Dept: FAMILY MEDICINE | Facility: CLINIC | Age: 50
End: 2025-04-28

## 2025-04-28 VITALS
OXYGEN SATURATION: 98 % | SYSTOLIC BLOOD PRESSURE: 110 MMHG | BODY MASS INDEX: 33.91 KG/M2 | HEIGHT: 66 IN | WEIGHT: 211 LBS | DIASTOLIC BLOOD PRESSURE: 70 MMHG | HEART RATE: 84 BPM

## 2025-04-28 DIAGNOSIS — S40.851A FOREIGN BODY OF RIGHT UPPER ARM: Primary | ICD-10-CM

## 2025-04-28 DIAGNOSIS — Z12.11 SCREEN FOR COLON CANCER: ICD-10-CM

## 2025-04-28 PROCEDURE — 3008F BODY MASS INDEX DOCD: CPT | Mod: CPTII,S$GLB,, | Performed by: NURSE PRACTITIONER

## 2025-04-28 PROCEDURE — 99213 OFFICE O/P EST LOW 20 MIN: CPT | Mod: S$GLB,,, | Performed by: NURSE PRACTITIONER

## 2025-04-28 PROCEDURE — 1159F MED LIST DOCD IN RCRD: CPT | Mod: CPTII,S$GLB,, | Performed by: NURSE PRACTITIONER

## 2025-04-28 PROCEDURE — 1160F RVW MEDS BY RX/DR IN RCRD: CPT | Mod: CPTII,S$GLB,, | Performed by: NURSE PRACTITIONER

## 2025-04-28 PROCEDURE — 3074F SYST BP LT 130 MM HG: CPT | Mod: CPTII,S$GLB,, | Performed by: NURSE PRACTITIONER

## 2025-04-28 PROCEDURE — 3078F DIAST BP <80 MM HG: CPT | Mod: CPTII,S$GLB,, | Performed by: NURSE PRACTITIONER

## 2025-04-28 RX ORDER — CEPHALEXIN 500 MG/1
500 CAPSULE ORAL EVERY 6 HOURS
COMMUNITY

## 2025-04-28 NOTE — PROGRESS NOTES
SUBJECTIVE:      Patient ID: eJm Alvarez is a 49 y.o. male.    Chief Complaint: thorn in arm    HPI  History of Present Illness    CHIEF COMPLAINT:  Jem presents today for evaluation of a foreign body in arm after unsuccessful removal attempt at the ER and at surgeons office    HISTORY OF PRESENT ILLNESS:  He initially presented to ER on Thursday at noon for foreign body in arm. X-ray and MRI were performed, and he received antibiotic injections. He was evaluated by surgeon Dr. Cristofer Bowman on Friday, who attempted removal of foreign body for 1 hour and 20 minutes without success. Has a follow up tomorrow    PREVENTIVE CARE:  He is due for first-time Cologuard screening, which has been ordered.    FAMILY HISTORY:  He denies any family history of colon cancer.         Past Surgical History:   Procedure Laterality Date    CYSTOSCOPY N/A 11/3/2022    Procedure: CYSTOSCOPY;  Surgeon: Tre Hutton MD;  Location: [facility];  Service: Urology;  Laterality: N/A;    CYSTOSCOPY W/ URETERAL STENT PLACEMENT Right 10/13/2022    Procedure: CYSTOSCOPY, WITH URETERAL STENT INSERTION;  Surgeon: Tre Hutton MD;  Location: [facility] OR;  Service: Urology;  Laterality: Right;    INSERTION OF URETERAL CATHETER Right 11/3/2022    Procedure: INSERTION, CATHETER, URETER;  Surgeon: Tre Hutton MD;  Location: [facility] OR;  Service: Urology;  Laterality: Right;    REPAIR OF TORSION OF TESTICLE Right     age 15    URETEROSCOPY Right 11/3/2022    Procedure: URETEROSCOPY;  Surgeon: Tre Hutton MD;  Location: [facility] OR;  Service: Urology;  Laterality: Right;    VASECTOMY       Family History   Problem Relation Name Age of Onset    Hypertension Unknown      No Known Problems Mother      Hypertension Father      Arthritis Father      Nephrolithiasis Father      Stroke Maternal Grandmother      No Known Problems Maternal Grandfather      Stroke Paternal Grandmother      No Known Problems Paternal Grandfather       "Hodgkin's lymphoma Son          age 17    Diabetes Neg Hx        Social History     Socioeconomic History    Marital status:    Occupational History    Occupation:     Occupation: fire department   Tobacco Use    Smoking status: Former     Current packs/day: 0.00     Average packs/day: 0.5 packs/day for 8.8 years (4.4 ttl pk-yrs)     Types: Cigarettes     Start date: 2015     Quit date: 10/10/2023     Years since quittin.5     Passive exposure: Current    Smokeless tobacco: Never   Substance and Sexual Activity    Alcohol use: Yes     Comment: seldom    Drug use: No    Sexual activity: Yes   Social History Narrative    Live with partner     Social Drivers of Health     Financial Resource Strain: Low Risk  (10/12/2022)    Overall Financial Resource Strain (CARDIA)     Difficulty of Paying Living Expenses: Not hard at all   Food Insecurity: No Food Insecurity (10/12/2022)    Hunger Vital Sign     Worried About Running Out of Food in the Last Year: Never true     Ran Out of Food in the Last Year: Never true   Transportation Needs: No Transportation Needs (10/12/2022)    PRAPARE - Transportation     Lack of Transportation (Medical): No     Lack of Transportation (Non-Medical): No   Physical Activity: Sufficiently Active (10/12/2022)    Exercise Vital Sign     Days of Exercise per Week: 7 days     Minutes of Exercise per Session: 60 min   Stress: No Stress Concern Present (10/12/2022)    [employer] [place] [employer] - Occupational Stress Questionnaire     Feeling of Stress : Not at all   Housing Stability: Unknown (10/12/2022)    Housing Stability Vital Sign     Unable to Pay for Housing in the Last Year: No     Unstable Housing in the Last Year: No     Current Medications[1]  Review of patient's allergies indicates:   Allergen Reactions    Anectine [succinylcholine chloride] Anaphylaxis     "Stopped breathing and Heart stopped" @ age 15  This is NOT a malignant " hyperthermia situation.    Anectine dalton-pack       Past Medical History:   Diagnosis Date    Anxiety     Hypertension     not on meds    Kidney stone      Past Surgical History:   Procedure Laterality Date    CYSTOSCOPY N/A 11/3/2022    Procedure: CYSTOSCOPY;  Surgeon: Tre Hutton MD;  Location: [facility] OR;  Service: Urology;  Laterality: N/A;    CYSTOSCOPY W/ URETERAL STENT PLACEMENT Right 10/13/2022    Procedure: CYSTOSCOPY, WITH URETERAL STENT INSERTION;  Surgeon: Tre Hutton MD;  Location: [facility] OR;  Service: Urology;  Laterality: Right;    INSERTION OF URETERAL CATHETER Right 11/3/2022    Procedure: INSERTION, CATHETER, URETER;  Surgeon: Tre Hutton MD;  Location: [facility] OR;  Service: Urology;  Laterality: Right;    REPAIR OF TORSION OF TESTICLE Right     age 15    URETEROSCOPY Right 11/3/2022    Procedure: URETEROSCOPY;  Surgeon: Tre Hutton MD;  Location: [facility] OR;  Service: Urology;  Laterality: Right;    VASECTOMY         Review of Systems   Constitutional:  Negative for appetite change, chills, diaphoresis, fever and unexpected weight change.   HENT:  Negative for ear discharge, facial swelling, hearing loss, nosebleeds and trouble swallowing.    Eyes:  Negative for photophobia, pain and visual disturbance.   Respiratory:  Negative for apnea, choking, shortness of breath and wheezing.    Cardiovascular:  Negative for chest pain and palpitations.   Gastrointestinal:  Negative for abdominal pain, blood in stool and vomiting.   Endocrine: Negative for polyphagia.   Genitourinary:  Negative for difficulty urinating and hematuria.   Musculoskeletal:  Negative for gait problem and joint swelling.   Skin:  Negative for pallor.   Neurological:  Negative for dizziness, seizures and speech difficulty.   Hematological:  Does not bruise/bleed easily.   Psychiatric/Behavioral:  Negative for agitation and confusion. The patient is not nervous/anxious.       OBJECTIVE:      Vitals:    04/28/25  "1007   BP: 110/70   Pulse: 84   SpO2: 98%   Weight: 95.7 kg (211 lb)   Height: 5' 6" (1.676 m)     Physical Exam  Vitals and nursing note reviewed.   Constitutional:       Appearance: He is well-developed.   HENT:      Head: Atraumatic.   Eyes:      Conjunctiva/sclera: Conjunctivae normal.   Cardiovascular:      Rate and Rhythm: Normal rate and regular rhythm.      Heart sounds: Normal heart sounds.   Pulmonary:      Effort: Pulmonary effort is normal.      Breath sounds: Normal breath sounds.   Musculoskeletal:         General: Normal range of motion.      Cervical back: Neck supple.   Skin:     General: Skin is warm and dry.      Comments: Left forearm with sutures intact, no erythema, no oozing, no pain. Dressing intact    Neurological:      Mental Status: He is alert and oriented to person, place, and time.       No visits with results within 1 Month(s) from this visit.   Latest known visit with results is:   Lab Visit on 01/07/2025   Component Date Value Ref Range Status    Sodium 01/07/2025 139  136 - 145 mmol/L Final    Potassium 01/07/2025 4.0  3.5 - 5.1 mmol/L Final    Chloride 01/07/2025 103  95 - 110 mmol/L Final    CO2 01/07/2025 32 (H)  23 - 29 mmol/L Final    Glucose 01/07/2025 86  70 - 110 mg/dL Final    BUN 01/07/2025 14  6 - 20 mg/dL Final    Creatinine 01/07/2025 1.0  0.5 - 1.4 mg/dL Final    Calcium 01/07/2025 9.6  8.7 - 10.5 mg/dL Final    Total Protein 01/07/2025 7.2  6.0 - 8.4 g/dL Final    Albumin 01/07/2025 4.4  3.5 - 5.2 g/dL Final    Total Bilirubin 01/07/2025 0.4  0.1 - 1.0 mg/dL Final    Comment: For infants and newborns, interpretation of results should be based  on gestational age, weight and in agreement with clinical  observations.    Premature Infant recommended reference ranges:  Up to 24 hours.............<8.0 mg/dL  Up to 48 hours............<12.0 mg/dL  3-5 days..................<15.0 mg/dL  6-29 days.................<15.0 mg/dL      Alkaline Phosphatase 01/07/2025 75  55 - 135 " U/L Final    AST 01/07/2025 17  10 - 40 U/L Final    ALT 01/07/2025 41  10 - 44 U/L Final    eGFR 01/07/2025 >60.0  >60 mL/min/1.73 m^2 Final    Anion Gap 01/07/2025 4 (L)  8 - 16 mmol/L Final    Cholesterol 01/07/2025 216 (H)  120 - 199 mg/dL Final    Comment: The National Cholesterol Education Program (NCEP) has set the  following guidelines (reference ranges) for Cholesterol:  Optimal.....................<200 mg/dL  Borderline High.............200-239 mg/dL  High........................> or = 240 mg/dL      Triglycerides 01/07/2025 175 (H)  30 - 150 mg/dL Final    Comment: The National Cholesterol Education Program (NCEP) has set the  following guidelines (reference values) for triglycerides:  Normal......................<150 mg/dL  Borderline High.............150-199 mg/dL  High........................200-499 mg/dL      HDL 01/07/2025 40  40 - 75 mg/dL Final    Comment: The National Cholesterol Education Program (NCEP) has set the  following guidelines (reference values) for HDL Cholesterol:  Low...............<40 mg/dL  Optimal...........>60 mg/dL      LDL Cholesterol 01/07/2025 141.0  63.0 - 159.0 mg/dL Final    Comment: The National Cholesterol Education Program (NCEP) has set the  following guidelines (reference values) for LDL Cholesterol:  Optimal.......................<130 mg/dL  Borderline High...............130-159 mg/dL  High..........................160-189 mg/dL  Very High.....................>190 mg/dL      HDL/Cholesterol Ratio 01/07/2025 18.5 (L)  20.0 - 50.0 % Final    Total Cholesterol/HDL Ratio 01/07/2025 5.4 (H)  2.0 - 5.0 Final    Non-HDL Cholesterol 01/07/2025 176  mg/dL Final    Comment: Risk category and Non-HDL cholesterol goals:  Coronary heart disease (CHD)or equivalent (10-year risk of CHD >20%):  Non-HDL cholesterol goal     <130 mg/dL  Two or more CHD risk factors and 10-year risk of CHD <= 20%:  Non-HDL cholesterol goal     <160 mg/dL  0 to 1 CHD risk factor:  Non-HDL cholesterol  goal     <190 mg/dL      Magnesium 01/07/2025 2.0  1.6 - 2.6 mg/dL Final      Assessment:       1. Foreign body of right upper arm    2. Screen for colon cancer        Plan:       Foreign body of right upper arm    Screen for colon cancer  -     Cologuard Screening (Multitarget Stool DNA); Future; Expected date: 04/28/2025      Assessment & Plan    FOREIGN BODY IN UPPER ARM:  - Assessed the patient's concern regarding a foreign body (plant material) in the arm after an unsuccessful removal attempt by another surgeon.  - Evaluated wound healing and current symptoms  - Reviewed the MRI report  - Discussed the need for potential follow-up imaging to locate the foreign body.  - Considered the possibility of the body naturally breaking down the foreign material over time.  - Noted that the patient was administered antibiotics  - Instructed the patient to follow up as scheduled with surgeon to discuss concerns    FAMILY HISTORY OF COLON POLYPS:  - Inquired about the patient's family history of colon cancer.  - Ordered a Cologuard test for colorectal cancer screening.         Follow up if symptoms worsen or fail to improve.  This note was generated with the assistance of ambient listening technology. Verbal consent was obtained by the patient and accompanying visitor(s) for the recording of patient appointment to facilitate this note. I attest to having reviewed and edited the generated note for accuracy, though some syntax or spelling errors may persist. Please contact the author of this note for any clarification.        4/28/2025 JOLENE Ibrahim, TRINHP             [1]   Current Outpatient Medications   Medication Sig Dispense Refill    cephALEXin (KEFLEX) 500 MG capsule Take 500 mg by mouth every 6 (six) hours.      metoprolol succinate (TOPROL-XL) 25 MG 24 hr tablet Take 1.5 tabs po daily 135 tablet 01     No current facility-administered medications for this visit.

## 2025-04-28 NOTE — TELEPHONE ENCOUNTER
----- Message from Aydee sent at 4/28/2025  9:05 AM CDT -----  The patient is returning a call. He has an appointment today at 10:20 with Franki. Pt's # [phone]

## 2025-05-11 PROBLEM — S51.841A: Status: ACTIVE | Noted: 2025-05-11

## 2025-06-29 ENCOUNTER — RESULTS FOLLOW-UP (OUTPATIENT)
Dept: FAMILY MEDICINE | Facility: CLINIC | Age: 50
End: 2025-06-29

## 2025-07-22 NOTE — PROGRESS NOTES
SUBJECTIVE:      Patient ID: Jem Alvarez is a 50 y.o. male.    Chief Complaint: Hypertension    HPI  History of Present Illness    CHIEF COMPLAINT:  Jem presents today for follow up on htn    HYPERLIPIDEMIA:  He reports progressively increasing LDL cholesterol levels from 128 to 134, and most recently 141. He expresses concern about this upward trajectory.    GERD:  He experiences nighttime heartburn, particularly when rolling onto his right side. He reports periodic Nexium use, typically taking it for 2-3 months before discontinuing for extended periods. He is seeking prescription-strength Nexium for symptom management.    GI CONCERNS:  He reports softer, less formed stools which he attributes to increased magnesium intake for heartburn management.    CURRENT MEDICATIONS:  He continues metoprolol without issues.    SOCIAL HISTORY:  He reports intermittent smoking history with periods of occasional use lasting couple months. He expresses desire to quit completely and trying to cut back          Past Surgical History:   Procedure Laterality Date    CYSTOSCOPY N/A 11/3/2022    Procedure: CYSTOSCOPY;  Surgeon: Tre Hutton MD;  Location: [facility] OR;  Service: Urology;  Laterality: N/A;    CYSTOSCOPY W/ URETERAL STENT PLACEMENT Right 10/13/2022    Procedure: CYSTOSCOPY, WITH URETERAL STENT INSERTION;  Surgeon: Tre Hutton MD;  Location: [facility] OR;  Service: Urology;  Laterality: Right;    INSERTION OF URETERAL CATHETER Right 11/3/2022    Procedure: INSERTION, CATHETER, URETER;  Surgeon: Tre Hutton MD;  Location: [facility] OR;  Service: Urology;  Laterality: Right;    REPAIR OF TORSION OF TESTICLE Right     age 15    URETEROSCOPY Right 11/3/2022    Procedure: URETEROSCOPY;  Surgeon: Tre Hutton MD;  Location: [facility] OR;  Service: Urology;  Laterality: Right;    VASECTOMY       Family History   Problem Relation Name Age of Onset    Hypertension Unknown      No Known Problems Mother       Hypertension Father      Arthritis Father      Nephrolithiasis Father      Stroke Maternal Grandmother      No Known Problems Maternal Grandfather      Stroke Paternal Grandmother      No Known Problems Paternal Grandfather      Hodgkin's lymphoma Son          age 17    Diabetes Neg Hx        Social History     Socioeconomic History    Marital status:    Occupational History    Occupation:     Occupation: fire department   Tobacco Use    Smoking status: Every Day     Current packs/day: 0.00     Average packs/day: 0.5 packs/day for 8.8 years (4.4 ttl pk-yrs)     Types: Cigarettes     Start date: 2015     Last attempt to quit: 10/10/2023     Years since quittin.7     Passive exposure: Current    Smokeless tobacco: Never   Vaping Use    Vaping status: Never Used   Substance and Sexual Activity    Alcohol use: Not Currently     Comment: seldom    Drug use: No    Sexual activity: Yes   Social History Narrative    Live with partner     Social Drivers of Health     Financial Resource Strain: Low Risk  (10/12/2022)    Overall Financial Resource Strain (CARDIA)     Difficulty of Paying Living Expenses: Not hard at all   Food Insecurity: No Food Insecurity (10/12/2022)    Hunger Vital Sign     Worried About Running Out of Food in the Last Year: Never true     Ran Out of Food in the Last Year: Never true   Transportation Needs: No Transportation Needs (10/12/2022)    PRAPARE - Transportation     Lack of Transportation (Medical): No     Lack of Transportation (Non-Medical): No   Physical Activity: Sufficiently Active (10/12/2022)    Exercise Vital Sign     Days of Exercise per Week: 7 days     Minutes of Exercise per Session: 60 min   Stress: No Stress Concern Present (10/12/2022)    [employer] [place] [employer] - Occupational Stress Questionnaire     Feeling of Stress : Not at all   Housing Stability: Unknown (10/12/2022)    Housing Stability Vital Sign     Unable to Pay for  "Housing in the Last Year: No     Unstable Housing in the Last Year: No     Current Medications[1]  Review of patient's allergies indicates:   Allergen Reactions    Anectine [succinylcholine chloride] Anaphylaxis     "Stopped breathing and Heart stopped" @ age 15  This is NOT a malignant hyperthermia situation.    Anectine dalton-pack       Past Medical History:   Diagnosis Date    Anxiety     Hypertension     not on meds    Kidney stone      Past Surgical History:   Procedure Laterality Date    CYSTOSCOPY N/A 11/3/2022    Procedure: CYSTOSCOPY;  Surgeon: Tre Hutton MD;  Location: [facility] OR;  Service: Urology;  Laterality: N/A;    CYSTOSCOPY W/ URETERAL STENT PLACEMENT Right 10/13/2022    Procedure: CYSTOSCOPY, WITH URETERAL STENT INSERTION;  Surgeon: Tre Hutton MD;  Location: [facility] OR;  Service: Urology;  Laterality: Right;    INSERTION OF URETERAL CATHETER Right 11/3/2022    Procedure: INSERTION, CATHETER, URETER;  Surgeon: Tre Hutton MD;  Location: [facility] OR;  Service: Urology;  Laterality: Right;    REPAIR OF TORSION OF TESTICLE Right     age 15    URETEROSCOPY Right 11/3/2022    Procedure: URETEROSCOPY;  Surgeon: Tre Hutton MD;  Location: [facility] OR;  Service: Urology;  Laterality: Right;    VASECTOMY         Review of Systems   Constitutional:  Negative for appetite change, chills, diaphoresis and unexpected weight change.   HENT:  Negative for ear discharge, facial swelling, hearing loss, nosebleeds and trouble swallowing.    Eyes:  Negative for photophobia, pain and visual disturbance.   Respiratory:  Negative for apnea, choking, shortness of breath and wheezing.    Cardiovascular:  Negative for chest pain and palpitations.   Gastrointestinal:  Negative for abdominal pain, blood in stool and vomiting.   Endocrine: Negative for polyphagia.   Genitourinary:  Negative for difficulty urinating and hematuria.   Musculoskeletal:  Negative for gait problem and joint swelling.   Skin:  " "Negative for pallor.   Neurological:  Negative for dizziness, seizures, speech difficulty, light-headedness and headaches.   Hematological:  Does not bruise/bleed easily.   Psychiatric/Behavioral:  Negative for agitation, confusion, dysphoric mood, self-injury, sleep disturbance and suicidal ideas. The patient is not nervous/anxious.       OBJECTIVE:      Vitals:    07/23/25 0838   BP: (P) 110/76   Pulse: 77   SpO2: 96%   Weight: 96.6 kg (213 lb)   Height: 5' 6" (1.676 m)     Physical Exam  Vitals and nursing note reviewed.   Constitutional:       General: He is not in acute distress.     Appearance: He is well-developed.   HENT:      Head: Normocephalic and atraumatic.      Nose: Nose normal.      Mouth/Throat:      Pharynx: Uvula midline.   Eyes:      General: Lids are normal.      Conjunctiva/sclera: Conjunctivae normal.      Pupils: Pupils are equal, round, and reactive to light.      Right eye: Pupil is round and reactive.      Left eye: Pupil is round and reactive.   Neck:      Thyroid: No thyromegaly.      Vascular: No carotid bruit.   Cardiovascular:      Rate and Rhythm: Normal rate and regular rhythm.      Pulses: Normal pulses.      Heart sounds: Normal heart sounds.   Pulmonary:      Effort: Pulmonary effort is normal.      Breath sounds: Normal breath sounds. No wheezing, rhonchi or rales.   Abdominal:      General: Bowel sounds are normal.      Palpations: Abdomen is soft. Abdomen is not rigid.      Tenderness: There is no abdominal tenderness.   Musculoskeletal:         General: Normal range of motion.      Cervical back: Normal range of motion and neck supple.      Right lower leg: No edema.      Left lower leg: No edema.   Lymphadenopathy:      Cervical: No cervical adenopathy.   Skin:     General: Skin is warm and dry.      Nails: There is no clubbing.   Neurological:      Mental Status: He is alert and oriented to person, place, and time.   Psychiatric:         Mood and Affect: Mood normal.        "  Speech: Speech normal.         Behavior: Behavior normal. Behavior is cooperative.         Thought Content: Thought content normal.         Judgment: Judgment normal.       No visits with results within 1 Month(s) from this visit.   Latest known visit with results is:   Office Visit on 04/28/2025   Component Date Value Ref Range Status    Cologuard Result 06/25/2025 Negative  Negative Final    Comment: The Cologuard (TM) test was performed on this specimen.    NEGATIVE TEST RESULT. A negative Cologuard result indicates a low likelihood that a colorectal cancer (CRC) or advanced adenoma (adenomatous polyps with more advanced pre-malignant features) is present. The chance that a person with a negative Cologuard test has a colorectal cancer is less than 1 in 1500 (negative predictive value >99.9%) or has an advanced adenoma is less than 5.3% (negative predictive value 94.7%). These data are based on a prospective cross-sectional study of 10,000 individuals at average risk for colorectal cancer who were screened with both Cologuard and colonoscopy. (Juventino WESTFALL. et al, N Engl J Med 2014;370(14):7427-2931) The normal value (reference range) for this assay is negative.    COLOGUARD RE-SCREENING RECOMMENDATION: Periodic colorectal cancer screening is an important part of preventive healthcare for asymptomatic individuals at average risk for colorectal cancer. Following a negative Cologuard                            result, the American Cancer Society and U.S. Multi-Society Task Force screening guidelines recommend a Cologuard re-screening interval of 3 years.   References: American Cancer Society Guideline for Colorectal Cancer Screening: https://www.cancer.org/cancer/colon-rectal-cancer/tuvhuckhd-ujtqjfotd-emnfdig/acs-recommendations.html.; Robbin CHRISTIAN, Luther RODRIGUEZ, Shoaib RAI, Colorectal Cancer Screening: Recommendations for Physicians and Patients from the U.S. Multi-Society Task Force on Colorectal Cancer Screening , Am J  Gastroenterology 2017; 112:9194-8842.    TEST DESCRIPTION: Composite algorithmic analysis of stool DNA-biomarkers with hemoglobin immunoassay.   Quantitative values of individual biomarkers are not reportable and are not associated with individual biomarker result reference ranges. Cologuard is intended for colorectal cancer screening of adults of either sex, 45 years or older, who are at average-risk for colorectal cancer (CRC). Cologuard has been approved for use by the U.S.                            FDA. The performance of Cologuard was established in a cross sectional study of average-risk adults aged 50-84. Cologuard performance in patients ages 45 to 49 years was estimated by sub-group analysis of near-age groups. Colonoscopies performed for a positive result may find as the most clinically significant lesion: colorectal cancer [4.0%], advanced adenoma (including sessile serrated polyps greater than or equal to 1cm diameter) [20%] or non- advanced adenoma [31%]; or no colorectal neoplasia [45%]. These estimates are derived from a prospective cross-sectional screening study of 10,000 individuals at average risk for colorectal cancer who were screened with both Cologuard and colonoscopy. (Juventino Biggs al, N Engl J Med 2014;370(14):3569-3238.) Cologuard may produce a false negative or false positive result (no colorectal cancer or precancerous polyp present at colonoscopy follow up). A negative Cologuard test result does not guarantee the absence of CRC or advanced adenoma                            (pre-cancer). The current Cologuard screening interval is every 3 years. (American Cancer Society and U.S. Multi-Society Task Force). Cologuard performance data in a 10,000 patient pivotal study using colonoscopy as the reference method can be accessed at the following location: www.Synapse.com/results. Additional description of the Cologuard test process, warnings and precautions can be found at  www.[employer].Symwave.        Assessment:       1. Essential hypertension    2. Gastroesophageal reflux disease, unspecified whether esophagitis present    3. Mixed hyperlipidemia        Plan:       Essential hypertension  -     metoprolol succinate (TOPROL-XL) 25 MG 24 hr tablet; Take 1.5 tabs po daily  Dispense: 135 tablet; Refill: 01    Gastroesophageal reflux disease, unspecified whether esophagitis present  -   start  esomeprazole (NEXIUM) 40 MG capsule; Take 1 capsule (40 mg total) by mouth before breakfast.  Dispense: 90 capsule; Refill: 0    Mixed hyperlipidemia  Recommend high fiber, low fat diet, daily metamucil supplement and daily aerobic exercise      Assessment & Plan    HYPERLIPIDEMIA:  - Reviewed cholesterol trend, noting progressive LDL increase from 128 to 134 to 141.  - Jem is due for follow-up on cholesterol levels.  - Considered coronary calcium score, but pt declined at this time.  - Ordered comprehensive wellness labs     GASTROESOPHAGEAL REFLUX DISEASE (GERD):  - Monitored patient's heartburn, which is especially problematic at night.  - Discussed relationship between sleeping position and symptoms  - Recommend sleeping on left side.  - Prescribed Nexium (prescription strength) for heartburn management with instructions to take on an empty stomach and wait 30-40 minutes before eating.    SMOKING CESSATION:  - Monitored patient's smoking habits, noting intermittent quit attempts.  - Encouraged continued efforts to quit smoking.    GENERAL HEALTH MANAGEMENT:  - Assessed current metoprolol regimen, finding it effective with no reported issues.  - Advised patient to maintain adequate hydration, especially during summer.         Follow up in about 6 months (around 1/23/2026) for htn .  This note was generated with the assistance of ambient listening technology. Verbal consent was obtained by the patient and accompanying visitor(s) for the recording of patient appointment to facilitate this note. I  attest to having reviewed and edited the generated note for accuracy, though some syntax or spelling errors may persist. Please contact the author of this note for any clarification.        7/23/2025 JOLENE Ibrahim, JED             [1]   Current Outpatient Medications   Medication Sig Dispense Refill    esomeprazole (NEXIUM) 40 MG capsule Take 1 capsule (40 mg total) by mouth before breakfast. 90 capsule 0    metoprolol succinate (TOPROL-XL) 25 MG 24 hr tablet Take 1.5 tabs po daily 135 tablet 01     No current facility-administered medications for this visit.

## 2025-07-23 ENCOUNTER — OFFICE VISIT (OUTPATIENT)
Dept: FAMILY MEDICINE | Facility: CLINIC | Age: 50
End: 2025-07-23
Payer: COMMERCIAL

## 2025-07-23 VITALS — OXYGEN SATURATION: 96 % | WEIGHT: 213 LBS | BODY MASS INDEX: 34.23 KG/M2 | HEART RATE: 77 BPM | HEIGHT: 66 IN

## 2025-07-23 DIAGNOSIS — I10 ESSENTIAL HYPERTENSION: Primary | ICD-10-CM

## 2025-07-23 DIAGNOSIS — K21.9 GASTROESOPHAGEAL REFLUX DISEASE, UNSPECIFIED WHETHER ESOPHAGITIS PRESENT: ICD-10-CM

## 2025-07-23 DIAGNOSIS — E78.2 MIXED HYPERLIPIDEMIA: ICD-10-CM

## 2025-07-23 PROCEDURE — 1160F RVW MEDS BY RX/DR IN RCRD: CPT | Mod: CPTII,S$GLB,, | Performed by: NURSE PRACTITIONER

## 2025-07-23 PROCEDURE — 99214 OFFICE O/P EST MOD 30 MIN: CPT | Mod: S$GLB,,, | Performed by: NURSE PRACTITIONER

## 2025-07-23 PROCEDURE — 3008F BODY MASS INDEX DOCD: CPT | Mod: CPTII,S$GLB,, | Performed by: NURSE PRACTITIONER

## 2025-07-23 PROCEDURE — 1159F MED LIST DOCD IN RCRD: CPT | Mod: CPTII,S$GLB,, | Performed by: NURSE PRACTITIONER

## 2025-07-23 RX ORDER — METOPROLOL SUCCINATE 25 MG/1
TABLET, EXTENDED RELEASE ORAL
Qty: 135 TABLET | Refills: 1 | Status: SHIPPED | OUTPATIENT
Start: 2025-07-23

## 2025-07-23 RX ORDER — ESOMEPRAZOLE MAGNESIUM 40 MG/1
40 CAPSULE, DELAYED RELEASE ORAL
Qty: 90 CAPSULE | Refills: 0 | Status: SHIPPED | OUTPATIENT
Start: 2025-07-23

## (undated) DEVICE — TUBING SUCTION 12' ST2003

## (undated) DEVICE — GUIDEWIRE URO STRGHT 3CM TIP.035X150CM

## (undated) DEVICE — SOLUTION IRRI H2O BOTTLE 1000ML

## (undated) DEVICE — STRAP TABLE 5X72 M5173

## (undated) DEVICE — GLOVE #7.5 BIOGEL 30475

## (undated) DEVICE — TUBING CYSTO DOUBLE 654301

## (undated) DEVICE — BASKET STONE 3FX7MM NITINOL CONE

## (undated) DEVICE — SOLUTION H2O IRRIGATION 3000ML R8006

## (undated) DEVICE — UNDERGLOVE BIOGEL PI MICRO BLUE SZ 6.5

## (undated) DEVICE — PACK CYSTOSCOPY III

## (undated) DEVICE — SCRUB BACTOSHIELD 134439